# Patient Record
Sex: MALE | Race: BLACK OR AFRICAN AMERICAN | Employment: OTHER | URBAN - METROPOLITAN AREA
[De-identification: names, ages, dates, MRNs, and addresses within clinical notes are randomized per-mention and may not be internally consistent; named-entity substitution may affect disease eponyms.]

---

## 2017-03-05 RX ORDER — LEVETIRACETAM 500 MG/1
TABLET ORAL
Qty: 90 TAB | Refills: 3 | Status: SHIPPED | OUTPATIENT
Start: 2017-03-05 | End: 2017-12-12 | Stop reason: SDUPTHER

## 2017-03-09 RX ORDER — LEVETIRACETAM 500 MG/1
TABLET ORAL
Qty: 90 TAB | Refills: 3 | Status: SHIPPED | OUTPATIENT
Start: 2017-03-09 | End: 2017-11-10 | Stop reason: SDUPTHER

## 2017-03-16 RX ORDER — RASAGILINE 1 MG/1
TABLET ORAL
Qty: 30 TAB | Refills: 5 | Status: SHIPPED | OUTPATIENT
Start: 2017-03-16 | End: 2017-09-29 | Stop reason: SDUPTHER

## 2017-05-29 DIAGNOSIS — R41.3 MEMORY LOSS: ICD-10-CM

## 2017-05-29 DIAGNOSIS — R56.9 CONVULSIONS, UNSPECIFIED CONVULSION TYPE (HCC): ICD-10-CM

## 2017-05-29 DIAGNOSIS — G20 PARKINSON DISEASE (HCC): ICD-10-CM

## 2017-05-29 DIAGNOSIS — E53.8 B12 DEFICIENCY: ICD-10-CM

## 2017-05-29 DIAGNOSIS — R27.0 ATAXIA: ICD-10-CM

## 2017-05-29 DIAGNOSIS — I69.30 LATE EFFECT OF STROKE: ICD-10-CM

## 2017-05-29 DIAGNOSIS — R55 VASOVAGAL SYNCOPE: ICD-10-CM

## 2017-05-29 RX ORDER — ASPIRIN AND DIPYRIDAMOLE 25; 200 MG/1; MG/1
CAPSULE, EXTENDED RELEASE ORAL
Qty: 60 CAP | Refills: 6 | Status: SHIPPED | OUTPATIENT
Start: 2017-05-29 | End: 2017-10-11 | Stop reason: SDUPTHER

## 2017-06-30 DIAGNOSIS — F02.80 DEMENTIA OF THE ALZHEIMER'S TYPE WITH LATE ONSET WITHOUT BEHAVIORAL DISTURBANCE (HCC): ICD-10-CM

## 2017-06-30 DIAGNOSIS — G20 PARKINSON DISEASE (HCC): ICD-10-CM

## 2017-06-30 DIAGNOSIS — I65.23 ASYMPTOMATIC CAROTID ARTERY STENOSIS, BILATERAL: ICD-10-CM

## 2017-06-30 DIAGNOSIS — G30.1 DEMENTIA OF THE ALZHEIMER'S TYPE WITH LATE ONSET WITHOUT BEHAVIORAL DISTURBANCE (HCC): ICD-10-CM

## 2017-06-30 DIAGNOSIS — I69.30 LATE EFFECT OF STROKE: ICD-10-CM

## 2017-06-30 DIAGNOSIS — Z86.73 HISTORY OF CVA (CEREBROVASCULAR ACCIDENT): ICD-10-CM

## 2017-06-30 DIAGNOSIS — R27.0 ATAXIA: ICD-10-CM

## 2017-06-30 DIAGNOSIS — E53.8 B12 DEFICIENCY: ICD-10-CM

## 2017-06-30 DIAGNOSIS — R55 VASOVAGAL SYNCOPE: ICD-10-CM

## 2017-06-30 DIAGNOSIS — R56.9 CONVULSIONS, UNSPECIFIED CONVULSION TYPE (HCC): ICD-10-CM

## 2017-06-30 DIAGNOSIS — R41.3 MEMORY LOSS: ICD-10-CM

## 2017-06-30 DIAGNOSIS — I65.23 STENOSIS OF BOTH CAROTID ARTERIES WITHOUT CEREBRAL INFARCTION: ICD-10-CM

## 2017-06-30 RX ORDER — ASPIRIN AND DIPYRIDAMOLE 25; 200 MG/1; MG/1
1 CAPSULE, EXTENDED RELEASE ORAL 2 TIMES DAILY
Qty: 180 CAP | Refills: 0 | Status: SHIPPED | OUTPATIENT
Start: 2017-06-30 | End: 2018-03-22 | Stop reason: SDUPTHER

## 2017-06-30 NOTE — TELEPHONE ENCOUNTER
Future Appointments  Date Time Provider Nakita Conchita   9/6/2017 1:20 PM Hyadee Russo MD [address] [place] [address],[facility], [id number]                         Last Appointment My Department:  4/19/2016    Please advise of refill below. Patient needs 90 day fill  Requested Prescriptions     Pending Prescriptions Disp Refills    aspirin-dipyridamole (AGGRENOX)  mg per SR capsule 180 Cap 0     Sig: Take 1 Cap by mouth two (2) times a day.  PATIENT MUST CALL TO MAKE APPOINTMENT FOR FURTHER REFILLS

## 2017-07-28 DIAGNOSIS — G20 PARKINSON DISEASE (HCC): ICD-10-CM

## 2017-07-28 DIAGNOSIS — R56.9 CONVULSIONS, UNSPECIFIED CONVULSION TYPE (HCC): ICD-10-CM

## 2017-07-28 DIAGNOSIS — E53.8 B12 DEFICIENCY: ICD-10-CM

## 2017-07-28 DIAGNOSIS — R27.0 ATAXIA: ICD-10-CM

## 2017-07-28 DIAGNOSIS — R55 VASOVAGAL SYNCOPE: ICD-10-CM

## 2017-07-28 DIAGNOSIS — R41.3 MEMORY LOSS: ICD-10-CM

## 2017-07-28 DIAGNOSIS — I69.30 LATE EFFECT OF STROKE: ICD-10-CM

## 2017-07-28 RX ORDER — CARBIDOPA AND LEVODOPA 25; 100 MG/1; MG/1
2 TABLET ORAL 3 TIMES DAILY
Qty: 180 TAB | Refills: 0 | Status: SHIPPED | OUTPATIENT
Start: 2017-07-28 | End: 2017-09-06 | Stop reason: SDUPTHER

## 2017-08-16 PROBLEM — N31.8 DETRUSOR DYSFUNCTION: Status: ACTIVE | Noted: 2017-08-16

## 2017-08-16 PROBLEM — R53.83 FATIGUE: Status: ACTIVE | Noted: 2017-08-16

## 2017-08-16 PROBLEM — E78.00 ELEVATED CHOLESTEROL: Status: ACTIVE | Noted: 2017-08-16

## 2017-08-16 PROBLEM — Z79.899 ON STATIN THERAPY: Status: ACTIVE | Noted: 2017-08-16

## 2017-08-16 PROBLEM — D51.0 PERNICIOUS ANEMIA: Status: ACTIVE | Noted: 2017-08-16

## 2017-08-16 PROBLEM — L03.90 CELLULITIS: Status: ACTIVE | Noted: 2017-08-16

## 2017-08-16 PROBLEM — C61 PROSTATE CANCER (HCC): Status: ACTIVE | Noted: 2017-08-16

## 2017-08-16 PROBLEM — M19.079 ARTHRITIS OF ANKLE: Status: ACTIVE | Noted: 2017-08-16

## 2017-08-16 PROBLEM — G20 PARKINSONS DISEASE (HCC): Status: ACTIVE | Noted: 2017-08-16

## 2017-08-16 PROBLEM — R63.4 WEIGHT LOSS: Status: ACTIVE | Noted: 2017-08-16

## 2017-08-16 PROBLEM — G40.909 SEIZURE DISORDER (HCC): Status: ACTIVE | Noted: 2017-08-16

## 2017-08-16 PROBLEM — Z72.0 TOBACCO ABUSE: Status: ACTIVE | Noted: 2017-08-16

## 2017-08-16 PROBLEM — I67.9 CEREBROVASCULAR DISEASE: Status: ACTIVE | Noted: 2017-08-16

## 2017-08-16 PROBLEM — I73.00 RAYNAUD DISEASE: Status: ACTIVE | Noted: 2017-08-16

## 2017-08-16 PROBLEM — R60.9 EDEMA: Status: ACTIVE | Noted: 2017-08-16

## 2017-08-16 RX ORDER — GENTAMICIN SULFATE 0.3 %
0.5 OINTMENT (GRAM) OPHTHALMIC (EYE) 3 TIMES DAILY
COMMUNITY
End: 2018-09-21

## 2017-08-16 RX ORDER — TAMSULOSIN HYDROCHLORIDE 0.4 MG/1
0.4 CAPSULE ORAL DAILY
COMMUNITY
End: 2018-01-11 | Stop reason: SDUPTHER

## 2017-08-16 RX ORDER — HYDROCHLOROTHIAZIDE 25 MG/1
25 TABLET ORAL DAILY
COMMUNITY
End: 2018-09-21

## 2017-08-16 RX ORDER — DICLOFENAC SODIUM 100 MG/1
100 TABLET, FILM COATED, EXTENDED RELEASE ORAL DAILY
COMMUNITY
End: 2018-09-21

## 2017-08-16 RX ORDER — METHYLPREDNISOLONE 4 MG/1
TABLET ORAL
COMMUNITY
End: 2018-09-21

## 2017-08-24 NOTE — TELEPHONE ENCOUNTER
Requested Prescriptions     Pending Prescriptions Disp Refills    enalapril (VASOTEC) 5 mg tablet 90 Tab 3     Sig: Take 1 Tab by mouth daily.        Last Refill: 06/27/2017  Next Appointment:09/06/2017

## 2017-08-25 RX ORDER — ENALAPRIL MALEATE 5 MG/1
5 TABLET ORAL DAILY
Qty: 90 TAB | Refills: 3 | Status: SHIPPED | OUTPATIENT
Start: 2017-08-25 | End: 2018-09-10 | Stop reason: SDUPTHER

## 2017-09-06 ENCOUNTER — OFFICE VISIT (OUTPATIENT)
Dept: INTERNAL MEDICINE CLINIC | Age: 82
End: 2017-09-06

## 2017-09-06 VITALS
DIASTOLIC BLOOD PRESSURE: 69 MMHG | WEIGHT: 139 LBS | HEIGHT: 67 IN | BODY MASS INDEX: 21.82 KG/M2 | SYSTOLIC BLOOD PRESSURE: 154 MMHG

## 2017-09-06 DIAGNOSIS — Z72.0 TOBACCO ABUSE: ICD-10-CM

## 2017-09-06 DIAGNOSIS — I10 ESSENTIAL HYPERTENSION: Chronic | ICD-10-CM

## 2017-09-06 DIAGNOSIS — G20 PARKINSON DISEASE (HCC): ICD-10-CM

## 2017-09-06 DIAGNOSIS — Z79.899 ON STATIN THERAPY: ICD-10-CM

## 2017-09-06 DIAGNOSIS — D64.9 ANEMIA, UNSPECIFIED: ICD-10-CM

## 2017-09-06 DIAGNOSIS — R41.3 MEMORY LOSS: ICD-10-CM

## 2017-09-06 DIAGNOSIS — R55 VASOVAGAL SYNCOPE: ICD-10-CM

## 2017-09-06 DIAGNOSIS — R27.0 ATAXIA: ICD-10-CM

## 2017-09-06 DIAGNOSIS — I69.30 LATE EFFECT OF STROKE: ICD-10-CM

## 2017-09-06 DIAGNOSIS — E78.00 ELEVATED CHOLESTEROL: ICD-10-CM

## 2017-09-06 DIAGNOSIS — Z23 ENCOUNTER FOR IMMUNIZATION: ICD-10-CM

## 2017-09-06 DIAGNOSIS — R56.9 CONVULSIONS, UNSPECIFIED CONVULSION TYPE (HCC): ICD-10-CM

## 2017-09-06 DIAGNOSIS — G40.909 SEIZURE DISORDER (HCC): Primary | ICD-10-CM

## 2017-09-06 DIAGNOSIS — I67.9 CEREBROVASCULAR DISEASE: ICD-10-CM

## 2017-09-06 DIAGNOSIS — E53.8 B12 DEFICIENCY: ICD-10-CM

## 2017-09-06 DIAGNOSIS — G20 PARKINSONS DISEASE (HCC): ICD-10-CM

## 2017-09-06 LAB
ALBUMIN SERPL-MCNC: 3.9 G/DL (ref 3.9–5.4)
ALKALINE PHOS POC: 77 U/L (ref 38–126)
ALT SERPL-CCNC: 34 U/L (ref 9–52)
AST SERPL-CCNC: 46 U/L (ref 14–36)
BACTERIA UA POCT, BACTPOCT: NORMAL
BILIRUB UR QL STRIP: NORMAL
BUN BLD-MCNC: 25 MG/DL (ref 9–20)
CALCIUM BLD-MCNC: 9.7 MG/DL (ref 8.4–10.2)
CASTS UA POCT: NORMAL
CHLORIDE BLD-SCNC: 105 MMOL/L (ref 98–107)
CHOLEST SERPL-MCNC: 124 MG/DL (ref 0–200)
CK (CPK) POC: 99 U/L (ref 30–135)
CLUE CELLS, CLUEPOCT: NEGATIVE
CO2 POC: 26 MMOL/L (ref 22–32)
CREAT BLD-MCNC: 1.2 MG/DL (ref 0.8–1.5)
CRYSTALS UA POCT, CRYSPOCT: NEGATIVE
EGFR (POC): 55.6
EPITHELIAL CELLS POCT, EPITHPOCT: NORMAL
GLUCOSE POC: 101 MG/DL (ref 75–110)
GLUCOSE UR-MCNC: NEGATIVE MG/DL
GRAN# POC: 8.3 K/UL (ref 2–7.8)
GRAN% POC: 80.1 % (ref 37–92)
HCT VFR BLD CALC: 35.9 % (ref 37–51)
HDLC SERPL-MCNC: 63 MG/DL (ref 35–130)
HGB BLD-MCNC: 11.6 G/DL (ref 12–18)
KETONES P FAST UR STRIP-MCNC: NORMAL MG/DL
LDL CHOLESTEROL POC: 51.4 MG/DL (ref 0–130)
LY# POC: 1.7 K/UL (ref 0.6–4.1)
LY% POC: 17 % (ref 10–58.5)
MCH RBC QN: 30.8 PG (ref 26–32)
MCHC RBC-ENTMCNC: 32.5 G/DL (ref 30–36)
MCV RBC: 95 FL (ref 80–97)
MID #, POC: 0.2 K/UL (ref 0–1.8)
MID% POC: 2.9 % (ref 0.1–24)
MUCUS UA POCT, MUCPOCT: NORMAL
PH UR STRIP: 5 [PH] (ref 5–7)
PLATELET # BLD: 203 K/UL (ref 140–440)
POTASSIUM SERPL-SCNC: 4.9 MMOL/L (ref 3.6–5)
PROT SERPL-MCNC: 6.7 G/DL (ref 6.3–8.2)
PROTEIN,URINE POC: NEGATIVE MG/DL
RBC # BLD: 3.79 M/UL (ref 4.2–6.3)
RBC UA POCT, RBCPOCT: NORMAL
SODIUM SERPL-SCNC: 145 MMOL/L (ref 137–145)
SP GR UR STRIP: 1.02 (ref 1.01–1.02)
TCHOL/HDL RATIO (POC): 2 (ref 0–4)
TOTAL BILIRUBIN POC: 0.3 MG/DL (ref 0.2–1.3)
TRICH UA POCT, TRICHPOC: NEGATIVE
TRIGL SERPL-MCNC: 48 MG/DL (ref 0–200)
UA UROBILINOGEN AMB POC: NORMAL (ref 0.2–1)
URINALYSIS CLARITY POC: CLEAR
URINALYSIS COLOR POC: NORMAL
URINE BLOOD POC: NEGATIVE
URINE LEUKOCYTES POC: NORMAL
URINE NITRITES POC: NEGATIVE
VLDLC SERPL CALC-MCNC: 9.6 MG/DL
WBC # BLD: 10.2 K/UL (ref 4.1–10.9)
WBC UA POCT, WBCPOCT: NORMAL
YEAST UA POCT, YEASTPOC: NEGATIVE

## 2017-09-06 RX ORDER — CARBIDOPA AND LEVODOPA 25; 100 MG/1; MG/1
TABLET ORAL
Qty: 180 TAB | Refills: 0 | Status: SHIPPED | OUTPATIENT
Start: 2017-09-06 | End: 2017-09-08 | Stop reason: SDUPTHER

## 2017-09-06 NOTE — PATIENT INSTRUCTIONS
Parkinson's Disease: Care Instructions  Your Care Instructions  Parkinson's disease can cause tremors, stiffness, and problems with movement. Severe or advanced cases can also cause problems with thinking. In Parkinson's disease, part of the brain cannot make enough dopamine, a chemical that helps control movement. Taking your medicines correctly and getting regular exercise may help you maintain your quality of life. There are many things that can cause Parkinson's disease symptoms, including some medicine, some toxins, and trauma to the head. The cause in most cases is not known. Follow-up care is a key part of your treatment and safety. Be sure to make and go to all appointments, and call your doctor if you are having problems. Its also a good idea to know your test results and keep a list of the medicines you take. How can you care for yourself at home? General care  · Take your medicines exactly as prescribed. Call your doctor if you think you are having a problem with your medicine. · Make sure your home is safe:  ¨ Place furniture so that you have something to hold on to as you walk around the house. ¨ Use chairs that make it easier to sit down and stand up. ¨ Group the things you use most, such as reading glasses, keys, and the telephone, in one easy-to-reach place. ¨ Tack down rugs so that you do not trip. ¨ Put no-slip tape and handrails in the tub to prevent falls. · Use a cane, walker, or scooter if your doctor suggests it. · Keep up your normal activities as much as you can. · Find ways to manage stress, which can make symptoms worse. · Spend time with family and friends. Join a support group for people with Parkinson's disease if you want extra help. · Depression is common with this condition. Tell your doctor if you have trouble sleeping, are eating too much or are not hungry, or feel sad or tearful all the time. Depression can be treated with medicine and counseling.   Diet and exercise  · Eat a balanced diet. · If you are taking levodopa, do not eat protein at the same time you take your medicine. Levodopa may not work as well if you take it at the same time you eat protein. You can eat normal amounts of protein. Talk to your doctor if you have questions. · If you have problems swallowing, change how and what you eat:  ¨ Try thick drinks, such as milk shakes. They are easier to swallow than other fluids. ¨ Do not eat foods that crumble easily. These can cause choking. ¨ Use a  to prepare food. Soft foods need less chewing. ¨ Eat small meals often so that you do not get tired from eating heavy meals. · Drink plenty of water and eat a high-fiber diet to prevent constipation. Parkinson'sand the medicines that treat itmay slow your intestines. · Get exercise on most days. Work with your doctor to set up a program of walking, swimming, or other exercise you are able to do. When should you call for help? Call your doctor now or seek immediate medical care if:  · You have a change in your symptoms. · You develop other problems from your condition, such as:  ¨ Injury from a fall. ¨ Thinking or memory problems. ¨ A urinary tract infection (burning pain when urinating). Watch closely for changes in your health, and be sure to contact your doctor if:  · You lose weight because of problems with eating. · You want more information about your condition or your medicines. Where can you learn more? Go to http://georgia-grant.info/. Enter D005 in the search box to learn more about \"Parkinson's Disease: Care Instructions. \"  Current as of: October 14, 2016  Content Version: 11.3  © 5759-6501 [employer]. Care instructions adapted under license by [employer] (which disclaims liability or warranty for this information).  If you have questions about a medical condition or this instruction, always ask your healthcare professional. [employer] disclaims any warranty or liability for your use of this information. Stopping Smoking: Care Instructions  Your Care Instructions  Cigarette smokers crave the nicotine in cigarettes. Giving it up is much harder than simply changing a habit. Your body has to stop craving the nicotine. It is hard to quit, but you can do it. There are many tools that people use to quit smoking. You may find that combining tools works best for you. There are several steps to quitting. First you get ready to quit. Then you get support to help you. After that, you learn new skills and behaviors to become a nonsmoker. For many people, a necessary step is getting and using medicine. Your doctor will help you set up the plan that best meets your needs. You may want to attend a smoking cessation program to help you quit smoking. When you choose a program, look for one that has proven success. Ask your doctor for ideas. You will greatly increase your chances of success if you take medicine as well as get counseling or join a cessation program.  Some of the changes you feel when you first quit tobacco are uncomfortable. Your body will miss the nicotine at first, and you may feel short-tempered and grumpy. You may have trouble sleeping or concentrating. Medicine can help you deal with these symptoms. You may struggle with changing your smoking habits and rituals. The last step is the tricky one: Be prepared for the smoking urge to continue for a time. This is a lot to deal with, but keep at it. You will feel better. Follow-up care is a key part of your treatment and safety. Be sure to make and go to all appointments, and call your doctor if you are having problems. Its also a good idea to know your test results and keep a list of the medicines you take. How can you care for yourself at home? · Ask your family, friends, and coworkers for support. You have a better chance of quitting if you have help and support.   · Join a support group, such as Nicotine Anonymous, for people who are trying to quit smoking. · Consider signing up for a smoking cessation program, such as the American Lung Association's Freedom from Smoking program.  · Set a quit date. Pick your date carefully so that it is not right in the middle of a big deadline or stressful time. Once you quit, do not even take a puff. Get rid of all ashtrays and lighters after your last cigarette. Clean your house and your clothes so that they do not smell of smoke. · Learn how to be a nonsmoker. Think about ways you can avoid those things that make you reach for a cigarette. ¨ Avoid situations that put you at greatest risk for smoking. For some people, it is hard to have a drink with friends without smoking. For others, they might skip a coffee break with coworkers who smoke. ¨ Change your daily routine. Take a different route to work or eat a meal in a different place. · Cut down on stress. Calm yourself or release tension by doing an activity you enjoy, such as reading a book, taking a hot bath, or gardening. · Talk to your doctor or pharmacist about nicotine replacement therapy, which replaces the nicotine in your body. You still get nicotine but you do not use tobacco. Nicotine replacement products help you slowly reduce the amount of nicotine you need. These products come in several forms, many of them available over-the-counter:  ¨ Nicotine patches  ¨ Nicotine gum and lozenges  ¨ Nicotine inhaler  · Ask your doctor about bupropion (Wellbutrin) or varenicline (Chantix), which are prescription medicines. They do not contain nicotine. They help you by reducing withdrawal symptoms, such as stress and anxiety. · Some people find hypnosis, acupuncture, and massage helpful for ending the smoking habit. · Eat a healthy diet and get regular exercise. Having healthy habits will help your body move past its craving for nicotine. · Be prepared to keep trying.  Most people are not successful the first few times they try to quit. Do not get mad at yourself if you smoke again. Make a list of things you learned and think about when you want to try again, such as next week, next month, or next year. Where can you learn more? Go to http://georgia-grant.info/. Enter L133 in the search box to learn more about \"Stopping Smoking: Care Instructions. \"  Current as of: March 20, 2017  Content Version: 11.3  © 0896-8597 [employer]. Care instructions adapted under license by [employer] (which disclaims liability or warranty for this information). If you have questions about a medical condition or this instruction, always ask your healthcare professional. [address] any warranty or liability for your use of this information. Vaccine Information Statement    Influenza (Flu) Vaccine (Inactivated or Recombinant): What you need to know    Many Vaccine Information Statements are available in Turkish and other languages. See www.immunize.org/vis  Hojas de Información Sobre Vacunas están disponibles en Español y en muchos otros idiomas. Visite www.immunize.org/vis    1. Why get vaccinated? Influenza (flu) is a contagious disease that spreads around the [place] every year, usually between October and May. Flu is caused by influenza viruses, and is spread mainly by coughing, sneezing, and close contact. Anyone can get flu. Flu strikes suddenly and can last several days. Symptoms vary by age, but can include:   fever/chills   sore throat   muscle aches   fatigue   cough   headache    runny or stuffy nose    Flu can also lead to pneumonia and blood infections, and cause diarrhea and seizures in children. If you have a medical condition, such as heart or lung disease, flu can make it worse. Flu is more dangerous for some people.  Infants and young children, people 72years of age and older, pregnant women, and people with certain health conditions or a weakened immune system are at greatest risk. Each year thousands of people in the [facility] die from flu, and many more are hospitalized. Flu vaccine can:   keep you from getting flu,   make flu less severe if you do get it, and   keep you from spreading flu to your family and other people. 2. Inactivated and recombinant flu vaccines    A dose of flu vaccine is recommended every flu season. Children 6 months through 6years of age may need two doses during the same flu season. Everyone else needs only one dose each flu season. Some inactivated flu vaccines contain a very small amount of a mercury-based preservative called thimerosal. Studies have not shown thimerosal in vaccines to be harmful, but flu vaccines that do not contain thimerosal are available. There is no live flu virus in flu shots. They cannot cause the flu. There are many flu viruses, and they are always changing. Each year a new flu vaccine is made to protect against three or four viruses that are likely to cause disease in the upcoming flu season. But even when the vaccine doesnt exactly match these viruses, it may still provide some protection    Flu vaccine cannot prevent:   flu that is caused by a virus not covered by the vaccine, or   illnesses that look like flu but are not. It takes about 2 weeks for protection to develop after vaccination, and protection lasts through the flu season. 3. Some people should not get this vaccine    Tell the person who is giving you the vaccine:     If you have any severe, life-threatening allergies. If you ever had a life-threatening allergic reaction after a dose of flu vaccine, or have a severe allergy to any part of this vaccine, you may be advised not to get vaccinated. Most, but not all, types of flu vaccine contain a small amount of egg protein.  If you ever had Guillain-Barré Syndrome (also called GBS).    Some people with a history of GBS should not get this vaccine. This should be discussed with your doctor.  If you are not feeling well. It is usually okay to get flu vaccine when you have a mild illness, but you might be asked to come back when you feel better. 4. Risks of a vaccine reaction    With any medicine, including vaccines, there is a chance of reactions. These are usually mild and go away on their own, but serious reactions are also possible. Most people who get a flu shot do not have any problems with it. Minor problems following a flu shot include:    soreness, redness, or swelling where the shot was given     hoarseness   sore, red or itchy eyes   cough   fever   aches   headache   itching   fatigue  If these problems occur, they usually begin soon after the shot and last 1 or 2 days. More serious problems following a flu shot can include the following:     There may be a small increased risk of Guillain-Barré Syndrome (GBS) after inactivated flu vaccine. This risk has been estimated at 1 or 2 additional cases per million people vaccinated. This is much lower than the risk of severe complications from flu, which can be prevented by flu vaccine.  Young children who get the flu shot along with pneumococcal vaccine (PCV13) and/or DTaP vaccine at the same time might be slightly more likely to have a seizure caused by fever. Ask your doctor for more information. Tell your doctor if a child who is getting flu vaccine has ever had a seizure. Problems that could happen after any injected vaccine:      People sometimes faint after a medical procedure, including vaccination. Sitting or lying down for about 15 minutes can help prevent fainting, and injuries caused by a fall. Tell your doctor if you feel dizzy, or have vision changes or ringing in the ears.  Some people get severe pain in the shoulder and have difficulty moving the arm where a shot was given.  This happens very rarely.  Any medication can cause a severe allergic reaction. Such reactions from a vaccine are very rare, estimated at about 1 in a million doses, and would happen within a few minutes to a few hours after the vaccination. As with any medicine, there is a very remote chance of a vaccine causing a serious injury or death. The safety of vaccines is always being monitored. For more information, visit: www.cdc.gov/vaccinesafety/    5. What if there is a serious reaction? What should I look for?  Look for anything that concerns you, such as signs of a severe allergic reaction, very high fever, or unusual behavior. Signs of a severe allergic reaction can include hives, swelling of the face and throat, difficulty breathing, a fast heartbeat, dizziness, and weakness  usually within a few minutes to a few hours after the vaccination. What should I do?  If you think it is a severe allergic reaction or other emergency that cant wait, call 9-1-1 and get the person to the nearest hospital. Otherwise, call your doctor.  Reactions should be reported to the Vaccine Adverse Event Reporting System (VAERS). Your doctor should file this report, or you can do it yourself through  the VAERS web site at www.vaers. [facility].gov, or by calling 6-335.843.5170. [employer] does not give medical advice. 6. The National Vaccine Injury Compensation Program    The [facility] Vaccine Injury Compensation Program (VICP) is a federal program that was created to compensate people who may have been injured by certain vaccines. Persons who believe they may have been injured by a vaccine can learn about the program and about filing a claim by calling 0-404.863.7873 or visiting the [employer] website at www.[facility].gov/vaccinecompensation. There is a time limit to file a claim for compensation. 7. How can I learn more?  Ask your healthcare provider.  He or she can give you the vaccine package insert or suggest other sources of information.  Call your local or state health department.  Contact the Centers for Disease Control and Prevention (CDC):  - Call 5-541.970.9532 (1-800-CDC-INFO) or  - Visit CDCs website at www.cdc.gov/flu    Vaccine Information Statement   Inactivated Influenza Vaccine   8/7/2015  42 DANILO Ferguson [id number]    Department of Health and Human Services  Centers for Disease Control and Prevention    Office Use Only    Vaccine Information Statement    Pneumococcal Polysaccharide Vaccine: What You Need to Know    Many Vaccine Information Statements are available in Croatian and other languages. See www.immunize.org/vis. Hojas de información Sobre Vacunas están disponibles en español y en muchos otros idiomas. Visite Aguilar.si. 1. Why get vaccinated? Vaccination can protect older adults (and some children and younger adults) from pneumococcal disease. Pneumococcal disease is caused by bacteria that can spread from person to person through close contact. It can cause ear infections, and it can also lead to more serious infections of the:   Lungs (pneumonia),   Blood (bacteremia), and   Covering of the brain and spinal cord (meningitis). Meningitis can cause deafness and brain damage, and it can be fatal.      Anyone can get pneumococcal disease, but children under 3years of age, people with certain medical conditions, adults over 72years of age, and cigarette smokers are at the highest risk. About 18,000 older adults die each year from pneumococcal disease in the [place]. Treatment of pneumococcal infections with penicillin and other drugs used to be more effective. But some strains of the disease have become resistant to these drugs. This makes prevention of the disease, through vaccination, even more important. 2. Pneumococcal polysaccharide vaccine (PPSV23)    Pneumococcal polysaccharide vaccine (PPSV23) protects against 23 types of pneumococcal bacteria.  It will not prevent all pneumococcal disease. PPSV23 is recommended for:   All adults 72years of age and older,   Anyone 2 through 59years of age with certain long-term health problems,   Anyone 2 through 59years of age with a weakened immune system,   Adults 23 through 59years of age who smoke cigarettes or have asthma. Most people need only one dose of PPSV. A second dose is recommended for certain high-risk groups. People 72 and older should get a dose even if they have gotten one or more doses of the vaccine before they turned 65. Your healthcare provider can give you more information about these recommendations. Most healthy adults develop protection within 2 to 3 weeks of getting the shot. 3. Some people should not get this vaccine     Anyone who has had a life-threatening allergic reaction to PPSV should not get another dose.  Anyone who has a severe allergy to any component of PPSV should not receive it. Tell your provider if you have any severe allergies.  Anyone who is moderately or severely ill when the shot is scheduled may be asked to wait until they recover before getting the vaccine. Someone with a mild illness can usually be vaccinated.  Children less than 3years of age should not receive this vaccine.  There is no evidence that PPSV is harmful to either a pregnant woman or to her fetus. However, as a precaution, women who need the vaccine should be vaccinated before becoming pregnant, if possible. 4. Risks of a vaccine reaction    With any medicine, including vaccines, there is a chance of side effects. These are usually mild and go away on their own, but serious reactions are also possible. About half of people who get PPSV have mild side effects, such as redness or pain where the shot is given, which go away within about two days. Less than 1 out of 100 people develop a fever, muscle aches, or more severe local reactions.     Problems that could happen after any vaccine:     People sometimes faint after a medical procedure, including vaccination. Sitting or lying down for about 15 minutes can help prevent fainting, and injuries caused by a fall. Tell your doctor if you feel dizzy, or have vision changes or ringing in the ears.  Some people get severe pain in the shoulder and have difficulty moving the arm where a shot was given. This happens very rarely.  Any medication can cause a severe allergic reaction. Such reactions from a vaccine are very rare, estimated at about 1 in a million doses, and would happen within a few minutes to a few hours after the vaccination. As with any medicine, there is a very remote chance of a vaccine causing a serious injury or death. The safety of vaccines is always being monitored. For more information, visit: www.cdc.gov/vaccinesafety/     5. What if there is a serious reaction? What should I look for? Look for anything that concerns you, such as signs of a severe allergic reaction, very high fever, or unusual behavior. Signs of a severe allergic reaction can include hives, swelling of the face and throat, difficulty breathing, a fast heartbeat, dizziness, and weakness. These would usually start a few minutes to a few hours after the vaccination. What should I do? If you think it is a severe allergic reaction or other emergency that cant wait, call 9-1-1 or get to the nearest hospital. Otherwise, call your doctor. Afterward, the reaction should be reported to the Vaccine Adverse Event Reporting System (VAERS). Your doctor might file this report, or you can do it yourself through the VAERS web site at www.vaers. hhs.gov, or by calling 5-572.521.5653. VAERS does not give medical advice. 6. How can I learn more?  Ask your doctor. He or she can give you the vaccine package insert or suggest other sources of information.  Call your local or state health department.    Contact the Centers for Disease Control and Prevention (CDC):  - Call 2-636.824.7361 (9-513-QZO-INFO) or  - Visit CDCs website at [facility] 04/24/2015    Department of Health and [facility] for Disease Control and Prevention    Office Use Only

## 2017-09-06 NOTE — PROGRESS NOTES
Qi Davis is a 80 y.o. male presenting for Blood Pressure Check (RM 1 - 6 mo follow up) and Cholesterol Problem  . 1. Have you been to the ER, urgent care clinic since your last visit? Hospitalized since your last visit? No    2. Have you seen or consulted any other health care providers outside of the [facility] since your last visit? Include any pap smears or colon screening.  No

## 2017-09-06 NOTE — MR AVS SNAPSHOT
Visit Information Date & Time Provider Department Dept. Phone Encounter #  
 9/6/2017  1:20 PM LUCIA Cordero MD [address] [place] [address] [facility] [phone] [id number] Follow-up Instructions Return in about 6 months (around 3/6/2018) for follow up. Your Appointments 3/9/2018  3:20 PM  
Follow Up with Rosita Barrios MD  
[facility] at [facility] Appt Note: f/u & medication refill [address], 
[address] [place] [address], [phone]  
[address] , [address] [place] [address], [phone] Upcoming Health Maintenance Date Due DTaP/Tdap/Td series (1 - Tdap) 5/3/1955 ZOSTER VACCINE AGE 60> 3/3/1994 GLAUCOMA SCREENING Q2Y 5/3/1999 MEDICARE YEARLY EXAM 5/3/1999 INFLUENZA AGE 9 TO ADULT 8/1/2017 Allergies as of 9/6/2017  Review Complete On: 9/6/2017 By: Maria Del Rosario Cody MD  
  
 Severity Noted Reaction Type Reactions Pcn [Penicillins]  06/01/2012    Swelling Current Immunizations  Reviewed on 4/18/2014 Name Date Influenza High Dose Vaccine PF 9/6/2017 Influenza Vaccine 12/2/2015, 12/6/2012 Pneumococcal Conjugate (PCV-13) 12/2/2015 Pneumococcal Polysaccharide (PPSV-23) 9/6/2017 Pneumococcal Vaccine (Unspecified Type) 1/1/2013, 12/6/2012 ZZZ-RETIRED (DO NOT USE) Pneumococcal Vaccine (Unspecified Type)  Deferred (Patient Refused) Not reviewed this visit You Were Diagnosed With   
  
 Codes Comments Seizure disorder ([facility]    -  Primary ICD-10-CM: O35.240 ICD-9-CM: 345.90 Parkinsons disease ([facility]     ICD-10-CM: G20 
ICD-9-CM: 332.0 Essential hypertension     ICD-10-CM: I10 
ICD-9-CM: 401.9 Cerebrovascular disease     ICD-10-CM: I67.9 ICD-9-CM: 437.9 On statin therapy     ICD-10-CM: Z79.899 ICD-9-CM: V58.69 Elevated cholesterol     ICD-10-CM: E78.00 ICD-9-CM: 272.0 Tobacco abuse     ICD-10-CM: Z72.0 ICD-9-CM: 305.1 Anemia, unspecified     ICD-10-CM: D64.9 ICD-9-CM: 285.9 Encounter for immunization     ICD-10-CM: B24 ICD-9-CM: V03.89 Vitals BP Height(growth percentile) Weight(growth percentile) BMI Smoking Status 154/69 (BP 1 Location: Left arm, BP Patient Position: Sitting) 5' 7\" (1.702 m) 139 lb (63 kg) 21.77 kg/m2 Current Every Day Smoker Vitals History BMI and BSA Data Body Mass Index Body Surface Area 21.77 kg/m 2 1.73 m 2 Preferred Pharmacy Pharmacy Name Phone [id number] [address], [facility] [phone] Your Updated Medication List  
  
   
This list is accurate as of: 9/6/17  2:03 PM.  Always use your most recent med list. amLODIPine 10 mg tablet Commonly known as:  Gurvinder Neema Take 10 mg by mouth daily. * aspirin-dipyridamole  mg per SR capsule Commonly known as:  AGGRENOX  
TAKE ONE CAPSULE BY MOUTH TWICE A DAY * aspirin-dipyridamole  mg per SR capsule Commonly known as:  AGGRENOX Take 1 Cap by mouth two (2) times a day. PATIENT MUST CALL TO MAKE APPOINTMENT FOR FURTHER REFILLS  
  
 carbidopa-levodopa  mg per tablet Commonly known as:  SINEMET  
TAKE 2 TABS BY MOUTH THREE (3) TIMES DAILY. PATIENT MUST MAKE APPOINTMENT FOR FURTHER REFILLS  
  
 DAILY MULTI-VITAMINS/IRON tablet Generic drug:  multivitamin with iron Take 1 Tab by mouth daily. diclofenac 100 mg ER tablet Commonly known as:  VOLTAREN XR Take 100 mg by mouth daily. enalapril 5 mg tablet Commonly known as:  Trinh Dimmer Take 1 Tab by mouth daily. folic acid-vit W3-JSU C72 2.5-25-2 mg tablet Commonly known as:  Amisha Range Take 1 Tab by mouth daily. gentamicin 0.3 % (3 mg/gram) ophthalmic ointment Commonly known as:  GARAMYCIN Administer 0.5 Inches to both eyes three (3) times daily. hydroCHLOROthiazide 25 mg tablet Commonly known as:  HYDRODIURIL Take 25 mg by mouth daily. * levETIRAcetam 500 mg tablet Commonly known as:  KEPPRA TAKE 1 TABLET BY MOUTH IN THE MORNING. THEN TAKE 2 TABLETS BY MOUTH AT BEDTIME. * levETIRAcetam 500 mg tablet Commonly known as:  KEPPRA TAKE 1 TABLET BY MOUTH IN THE MORNING. THEN TAKE 2 TABLETS BY MOUTH AT BEDTIME. MEDROL 4 mg Tab Generic drug:  methylPREDNISolone Take  by mouth daily (with breakfast). MYRBETRIQ PO Take  by mouth. OXYBUTYNIN CHLORIDE PO Take  by mouth.  
  
 pravastatin 80 mg tablet Commonly known as:  PRAVACHOL Take 1 Tab by mouth nightly. RAPAFLO 8 mg capsule Generic drug:  silodosin Take 8 mg by mouth daily (with breakfast). rasagiline 1 mg tablet Commonly known as:  AZILECT  
TAKE 1 TABLET BY MOUTH DAILY  
  
 tamsulosin 0.4 mg capsule Commonly known as:  FLOMAX Take 0.4 mg by mouth daily. VITAMIN D3 1,000 unit Cap Generic drug:  cholecalciferol Take  by mouth. * Notice: This list has 4 medication(s) that are the same as other medications prescribed for you. Read the directions carefully, and ask your doctor or other care provider to review them with you. We Performed the Following ADMIN INFLUENZA VIRUS VAC [ [facility]] ADMIN PNEUMOCOCCAL VACCINE [ [facility]] AMB POC CK (CPK) [09938 CPT(R)] AMB POC COMPLETE CBC,AUTOMATED ENTER [id number] CPT(R)] AMB POC COMPREHENSIVE METABOLIC PANEL [41730 CPT(R)] AMB POC LIPID PROFILE [60327 CPT(R)] AMB POC URINALYSIS DIP STICK AUTO W/ MICRO  [22540 CPT(R)] INFLUENZA VIRUS VACCINE, HIGH DOSE SEASONAL, PRESERVATIVE FREE [26113 CPT(R)] PNEUMOCOCCAL POLYSACCHARIDE VACCINE, 23-VALENT, ADULT OR IMMUNOSUPPRESSED PT DOSE, [82356 CPT(R)] Follow-up Instructions Return in about 6 months (around 3/6/2018) for follow up. Patient Instructions Parkinson's Disease: Care Instructions Your Care Instructions Parkinson's disease can cause tremors, stiffness, and problems with movement. Severe or advanced cases can also cause problems with thinking. In Parkinson's disease, part of the brain cannot make enough dopamine, a chemical that helps control movement. Taking your medicines correctly and getting regular exercise may help you maintain your quality of life. There are many things that can cause Parkinson's disease symptoms, including some medicine, some toxins, and trauma to the head. The cause in most cases is not known. Follow-up care is a key part of your treatment and safety. Be sure to make and go to all appointments, and call your doctor if you are having problems. Its also a good idea to know your test results and keep a list of the medicines you take. How can you care for yourself at home? General care · Take your medicines exactly as prescribed. Call your doctor if you think you are having a problem with your medicine. · Make sure your home is safe: 
¨ Place furniture so that you have something to hold on to as you walk around the house. ¨ Use chairs that make it easier to sit down and stand up. ¨ Group the things you use most, such as reading glasses, keys, and the telephone, in one easy-to-reach place. ¨ Tack down rugs so that you do not trip. ¨ Put no-slip tape and handrails in the tub to prevent falls. · Use a cane, walker, or scooter if your doctor suggests it. · Keep up your normal activities as much as you can. · Find ways to manage stress, which can make symptoms worse. · Spend time with family and friends. Join a support group for people with Parkinson's disease if you want extra help. · Depression is common with this condition. Tell your doctor if you have trouble sleeping, are eating too much or are not hungry, or feel sad or tearful all the time. Depression can be treated with medicine and counseling. Diet and exercise · Eat a balanced diet. · If you are taking levodopa, do not eat protein at the same time you take your medicine. Levodopa may not work as well if you take it at the same time you eat protein. You can eat normal amounts of protein. Talk to your doctor if you have questions. · If you have problems swallowing, change how and what you eat: ¨ Try thick drinks, such as milk shakes. They are easier to swallow than other fluids. ¨ Do not eat foods that crumble easily. These can cause choking. ¨ Use a  to prepare food. Soft foods need less chewing. ¨ Eat small meals often so that you do not get tired from eating heavy meals. · Drink plenty of water and eat a high-fiber diet to prevent constipation. Parkinson'sand the medicines that treat itmay slow your intestines. · Get exercise on most days. Work with your doctor to set up a program of walking, swimming, or other exercise you are able to do. When should you call for help? Call your doctor now or seek immediate medical care if: 
· You have a change in your symptoms. · You develop other problems from your condition, such as: ¨ Injury from a fall. ¨ Thinking or memory problems. ¨ A urinary tract infection (burning pain when urinating). Watch closely for changes in your health, and be sure to contact your doctor if: 
· You lose weight because of problems with eating. · You want more information about your condition or your medicines. Where can you learn more? Go to http://georgia-grant.info/. Enter H187 in the search box to learn more about \"Parkinson's Disease: Care Instructions. \" Current as of: October 14, 2016 Content Version: 11.3 © 3251-3050 [employer] transform cities. Care instructions adapted under license by [employer] (which disclaims liability or warranty for this information).  If you have questions about a medical condition or this instruction, always ask your healthcare professional. [employer] disclaims any warranty or liability for your use of this information. Stopping Smoking: Care Instructions Your Care Instructions Cigarette smokers crave the nicotine in cigarettes. Giving it up is much harder than simply changing a habit. Your body has to stop craving the nicotine. It is hard to quit, but you can do it. There are many tools that people use to quit smoking. You may find that combining tools works best for you. There are several steps to quitting. First you get ready to quit. Then you get support to help you. After that, you learn new skills and behaviors to become a nonsmoker. For many people, a necessary step is getting and using medicine. Your doctor will help you set up the plan that best meets your needs. You may want to attend a smoking cessation program to help you quit smoking. When you choose a program, look for one that has proven success. Ask your doctor for ideas. You will greatly increase your chances of success if you take medicine as well as get counseling or join a cessation program. 
Some of the changes you feel when you first quit tobacco are uncomfortable. Your body will miss the nicotine at first, and you may feel short-tempered and grumpy. You may have trouble sleeping or concentrating. Medicine can help you deal with these symptoms. You may struggle with changing your smoking habits and rituals. The last step is the tricky one: Be prepared for the smoking urge to continue for a time. This is a lot to deal with, but keep at it. You will feel better. Follow-up care is a key part of your treatment and safety. Be sure to make and go to all appointments, and call your doctor if you are having problems. Its also a good idea to know your test results and keep a list of the medicines you take. How can you care for yourself at home? · Ask your family, friends, and coworkers for support. You have a better chance of quitting if you have help and support. · Join a support group, such as Nicotine Anonymous, for people who are trying to quit smoking. · Consider signing up for a smoking cessation program, such as the American Lung Association's Freedom from Smoking program. 
· Set a quit date. Pick your date carefully so that it is not right in the middle of a big deadline or stressful time. Once you quit, do not even take a puff. Get rid of all ashtrays and lighters after your last cigarette. Clean your house and your clothes so that they do not smell of smoke. · Learn how to be a nonsmoker. Think about ways you can avoid those things that make you reach for a cigarette. ¨ Avoid situations that put you at greatest risk for smoking. For some people, it is hard to have a drink with friends without smoking. For others, they might skip a coffee break with coworkers who smoke. ¨ Change your daily routine. Take a different route to work or eat a meal in a different place. · Cut down on stress. Calm yourself or release tension by doing an activity you enjoy, such as reading a book, taking a hot bath, or gardening. · Talk to your doctor or pharmacist about nicotine replacement therapy, which replaces the nicotine in your body. You still get nicotine but you do not use tobacco. Nicotine replacement products help you slowly reduce the amount of nicotine you need. These products come in several forms, many of them available over-the-counter: ¨ Nicotine patches ¨ Nicotine gum and lozenges ¨ Nicotine inhaler · Ask your doctor about bupropion (Wellbutrin) or varenicline (Chantix), which are prescription medicines. They do not contain nicotine. They help you by reducing withdrawal symptoms, such as stress and anxiety. · Some people find hypnosis, acupuncture, and massage helpful for ending the smoking habit. · Eat a healthy diet and get regular exercise. Having healthy habits will help your body move past its craving for nicotine. · Be prepared to keep trying. Most people are not successful the first few times they try to quit. Do not get mad at yourself if you smoke again. Make a list of things you learned and think about when you want to try again, such as next week, next month, or next year. Where can you learn more? Go to http://georgia-grant.info/. Enter P364 in the search box to learn more about \"Stopping Smoking: Care Instructions. \" Current as of: March 20, 2017 Content Version: 11.3 © 0093-5208 [employer]. Care instructions adapted under license by [employer] (which disclaims liability or warranty for this information). If you have questions about a medical condition or this instruction, always ask your healthcare professional. [address] any warranty or liability for your use of this information. Vaccine Information Statement Influenza (Flu) Vaccine (Inactivated or Recombinant): What you need to know Many Vaccine Information Statements are available in French and other languages. See www.immunize.org/vis Hojas de Información Sobre Vacunas están disponibles en Español y en muchos otros idiomas. Visite www.immunize.org/vis 1. Why get vaccinated? Influenza (flu) is a contagious disease that spreads around the [place] every year, usually between October and May. Flu is caused by influenza viruses, and is spread mainly by coughing, sneezing, and close contact. Anyone can get flu. Flu strikes suddenly and can last several days. Symptoms vary by age, but can include: 
 fever/chills  sore throat  muscle aches  fatigue  cough  headache  runny or stuffy nose Flu can also lead to pneumonia and blood infections, and cause diarrhea and seizures in children. If you have a medical condition, such as heart or lung disease, flu can make it worse. Flu is more dangerous for some people.  Infants and young children, people 72years of age and older, pregnant women, and people with certain health conditions or a weakened immune system are at greatest risk. Each year thousands of people in the [facility] die from flu, and many more are hospitalized. Flu vaccine can: 
 keep you from getting flu, 
 make flu less severe if you do get it, and 
 keep you from spreading flu to your family and other people. 2. Inactivated and recombinant flu vaccines A dose of flu vaccine is recommended every flu season. Children 6 months through 6years of age may need two doses during the same flu season. Everyone else needs only one dose each flu season. Some inactivated flu vaccines contain a very small amount of a mercury-based preservative called thimerosal. Studies have not shown thimerosal in vaccines to be harmful, but flu vaccines that do not contain thimerosal are available. There is no live flu virus in flu shots. They cannot cause the flu. There are many flu viruses, and they are always changing. Each year a new flu vaccine is made to protect against three or four viruses that are likely to cause disease in the upcoming flu season. But even when the vaccine doesnt exactly match these viruses, it may still provide some protection Flu vaccine cannot prevent: 
 flu that is caused by a virus not covered by the vaccine, or 
 illnesses that look like flu but are not. It takes about 2 weeks for protection to develop after vaccination, and protection lasts through the flu season. 3. Some people should not get this vaccine Tell the person who is giving you the vaccine:  If you have any severe, life-threatening allergies. If you ever had a life-threatening allergic reaction after a dose of flu vaccine, or have a severe allergy to any part of this vaccine, you may be advised not to get vaccinated. Most, but not all, types of flu vaccine contain a small amount of egg protein.  If you ever had Guillain-Barré Syndrome (also called GBS). Some people with a history of GBS should not get this vaccine. This should be discussed with your doctor.  If you are not feeling well. It is usually okay to get flu vaccine when you have a mild illness, but you might be asked to come back when you feel better. 4. Risks of a vaccine reaction With any medicine, including vaccines, there is a chance of reactions. These are usually mild and go away on their own, but serious reactions are also possible. Most people who get a flu shot do not have any problems with it. Minor problems following a flu shot include:  
 soreness, redness, or swelling where the shot was given  hoarseness  sore, red or itchy eyes  cough  fever  aches  headache  itching  fatigue If these problems occur, they usually begin soon after the shot and last 1 or 2 days. More serious problems following a flu shot can include the following:  There may be a small increased risk of Guillain-Barré Syndrome (GBS) after inactivated flu vaccine. This risk has been estimated at 1 or 2 additional cases per million people vaccinated. This is much lower than the risk of severe complications from flu, which can be prevented by flu vaccine.  Young children who get the flu shot along with pneumococcal vaccine (PCV13) and/or DTaP vaccine at the same time might be slightly more likely to have a seizure caused by fever. Ask your doctor for more information. Tell your doctor if a child who is getting flu vaccine has ever had a seizure. Problems that could happen after any injected vaccine:  People sometimes faint after a medical procedure, including vaccination. Sitting or lying down for about 15 minutes can help prevent fainting, and injuries caused by a fall. Tell your doctor if you feel dizzy, or have vision changes or ringing in the ears.  Some people get severe pain in the shoulder and have difficulty moving the arm where a shot was given. This happens very rarely.  Any medication can cause a severe allergic reaction. Such reactions from a vaccine are very rare, estimated at about 1 in a million doses, and would happen within a few minutes to a few hours after the vaccination. As with any medicine, there is a very remote chance of a vaccine causing a serious injury or death. The safety of vaccines is always being monitored. For more information, visit: www.cdc.gov/vaccinesafety/ 
 
5. What if there is a serious reaction? What should I look for?  Look for anything that concerns you, such as signs of a severe allergic reaction, very high fever, or unusual behavior. Signs of a severe allergic reaction can include hives, swelling of the face and throat, difficulty breathing, a fast heartbeat, dizziness, and weakness  usually within a few minutes to a few hours after the vaccination. What should I do?  If you think it is a severe allergic reaction or other emergency that cant wait, call 9-1-1 and get the person to the nearest hospital. Otherwise, call your doctor.  Reactions should be reported to the Vaccine Adverse Event Reporting System (VAERS). Your doctor should file this report, or you can do it yourself through  the VAERS web site at www.vaers. [facility].gov, or by calling 4-959.130.4031. VAERS does not give medical advice. 6. The National Vaccine Injury Compensation Program 
 
The [facility] Vaccine Injury Compensation Program (VICP) is a federal program that was created to compensate people who may have been injured by certain vaccines. Persons who believe they may have been injured by a vaccine can learn about the program and about filing a claim by calling 7-806.989.4713 or visiting the [employer] website at www.[facility].gov/vaccinecompensation. There is a time limit to file a claim for compensation. 7. How can I learn more?  Ask your healthcare provider. He or she can give you the vaccine package insert or suggest other sources of information.  Call your local or state health department.  Contact the Centers for Disease Control and Prevention (CDC): 
- Call 2-452-052-143-386-3332 (2-932-IMC-INFO) or 
- Visit CDCs website at www.cdc.gov/flu Vaccine Information Statement Inactivated Influenza Vaccine 8/7/2015 
42 DANILO Morales [id number] [facility] [place] Centers for Disease Control and Prevention Office Use Only Vaccine Information Statement Pneumococcal Polysaccharide Vaccine: What You Need to Know Many Vaccine Information Statements are available in Malagasy and other languages. See www.immunize.org/vis. Hojas de información Sobre Vacunas están disponibles en español y en muchos otros idiomas. Visite Aguialr.si. 1. Why get vaccinated? Vaccination can protect older adults (and some children and younger adults) from pneumococcal disease. Pneumococcal disease is caused by bacteria that can spread from person to person through close contact. It can cause ear infections, and it can also lead to more serious infections of the: 
 Lungs (pneumonia),  Blood (bacteremia), and 
 Covering of the brain and spinal cord (meningitis). Meningitis can cause deafness and brain damage, and it can be fatal.   
 
Anyone can get pneumococcal disease, but children under 3years of age, people with certain medical conditions, adults over 72years of age, and cigarette smokers are at the highest risk. About 18,000 older adults die each year from pneumococcal disease in the [place]. Treatment of pneumococcal infections with penicillin and other drugs used to be more effective. But some strains of the disease have become resistant to these drugs. This makes prevention of the disease, through vaccination, even more important. 2. Pneumococcal polysaccharide vaccine (PPSV23) Pneumococcal polysaccharide vaccine (PPSV23) protects against 23 types of pneumococcal bacteria. It will not prevent all pneumococcal disease. PPSV23 is recommended for:  All adults 72years of age and older,  Anyone 2 through 59years of age with certain long-term health problems, 
 Anyone 2 through 59years of age with a weakened immune system, 
 Adults 23 through 59years of age who smoke cigarettes or have asthma. Most people need only one dose of PPSV. A second dose is recommended for certain high-risk groups. People 72 and older should get a dose even if they have gotten one or more doses of the vaccine before they turned 65. Your healthcare provider can give you more information about these recommendations. Most healthy adults develop protection within 2 to 3 weeks of getting the shot. 3. Some people should not get this vaccine  Anyone who has had a life-threatening allergic reaction to PPSV should not get another dose.  Anyone who has a severe allergy to any component of PPSV should not receive it. Tell your provider if you have any severe allergies.  Anyone who is moderately or severely ill when the shot is scheduled may be asked to wait until they recover before getting the vaccine. Someone with a mild illness can usually be vaccinated.  Children less than 3years of age should not receive this vaccine.  There is no evidence that PPSV is harmful to either a pregnant woman or to her fetus. However, as a precaution, women who need the vaccine should be vaccinated before becoming pregnant, if possible. 4. Risks of a vaccine reaction With any medicine, including vaccines, there is a chance of side effects. These are usually mild and go away on their own, but serious reactions are also possible.   
 
About half of people who get PPSV have mild side effects, such as redness or pain where the shot is given, which go away within about two days. Less than 1 out of 100 people develop a fever, muscle aches, or more severe local reactions. Problems that could happen after any vaccine:  People sometimes faint after a medical procedure, including vaccination. Sitting or lying down for about 15 minutes can help prevent fainting, and injuries caused by a fall. Tell your doctor if you feel dizzy, or have vision changes or ringing in the ears.  Some people get severe pain in the shoulder and have difficulty moving the arm where a shot was given. This happens very rarely.  Any medication can cause a severe allergic reaction. Such reactions from a vaccine are very rare, estimated at about 1 in a million doses, and would happen within a few minutes to a few hours after the vaccination. As with any medicine, there is a very remote chance of a vaccine causing a serious injury or death. The safety of vaccines is always being monitored. For more information, visit: www.cdc.gov/vaccinesafety/  
 
5. What if there is a serious reaction? What should I look for? Look for anything that concerns you, such as signs of a severe allergic reaction, very high fever, or unusual behavior. Signs of a severe allergic reaction can include hives, swelling of the face and throat, difficulty breathing, a fast heartbeat, dizziness, and weakness. These would usually start a few minutes to a few hours after the vaccination. What should I do? If you think it is a severe allergic reaction or other emergency that cant wait, call 9-1-1 or get to the nearest hospital. Otherwise, call your doctor. Afterward, the reaction should be reported to the Vaccine Adverse Event Reporting System (VAERS). Your doctor might file this report, or you can do it yourself through the VAERS web site at www.vaers. [facility].gov, or by calling 6-112.697.4267. VAERS does not give medical advice. 6. How can I learn more?  Ask your doctor. He or she can give you the vaccine package insert or suggest other sources of information.  Call your local or state health department.  Contact the Centers for Disease Control and Prevention (CDC): 
- Call 5-285.414.3590 (1-800-CDC-INFO) or 
- Visit CDCs website at www.cdc.gov/vaccines Vaccine Information Statement PPSV  
04/24/2015 Department of [facility] and [employer] Centers for Disease Control and Prevention Office Use Only Introducing [facility] SERVICES! Alida Zamora introduces Any.DO patient portal. Now you can access parts of your medical record, email your doctor's office, and request medication refills online. 1. In your internet browser, go to [email or web address] 2. Click on the First Time User? Click Here link in the Sign In box. You will see the New Member Sign Up page. 3. Enter your Any[employer]DO Access Code exactly as it appears below. You will not need to use this code after youve completed the sign-up process. If you do not sign up before the expiration date, you must request a new code. · Any.DO Access Code: [email or web address] Expires: 12/5/2017 12:54 PM 
 
4. Enter the last four digits of your Social Security Number (xxxx) and Date of Birth (mm/dd/yyyy) as indicated and click Submit. You will be taken to the next sign-up page. 5. Create a Any.DO ID. This will be your Any.DO login ID and cannot be changed, so think of one that is secure and easy to remember. 6. Create a Any.DO password. You can change your password at any time. 7. Enter your Password Reset Question and Answer. This can be used at a later time if you forget your password. 8. Enter your e-mail address. You will receive e-mail notification when new information is available in [address] 9. Click Sign Up. You can now view and download portions of your medical record. 10. Click the Download Summary menu link to download a portable copy of your medical information. If you have questions, please visit the Frequently Asked Questions section of the [employer] website. Remember, [employer] is NOT to be used for urgent needs. For medical emergencies, dial 911. Now available from your iPhone and Android! Please provide this summary of care documentation to your next provider. Your primary care clinician is listed as LUCIA Rodriguez. If you have any questions after today's visit, please call [phone].

## 2017-09-06 NOTE — PROGRESS NOTES
This note will not be viewable in [address] Frandy Ayon is a 80 y.o. male and presents with Blood Pressure Check (RM 1 - 6 mo follow up) and Cholesterol Problem  . Subjective:  Mr. Jasbir Saucedo presents today for follow-up of hypertension hyperlipidemia and Parkinson's disease. He is also seen by neurology and is on Azilect and Sinemet. Although his movement is slow and he is walking with a cane he appears to be doing fairly well. He denies any complaints related to his medications. He has no apparent side effects. He denies any headache chest pain palpitations PND orthopnea or pedal edema. He has no nausea vomiting. He is denied any urinary frequency or urgency. He notes no change in his bowel habits. Past Medical History:   Diagnosis Date    Arthritis of ankle 8/16/2017    Cancer [facility])     Prostate. 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    Edema 8/16/2017    Elevated cholesterol 8/16/2017    Fatigue 8/16/2017    Gastrointestinal disorder     ulcer    Hypertension     On statin therapy 8/16/2017    Parkinsons disease ([facility] [address] 8/16/2017    Pernicious anemia 8/16/2017    Prostate cancer ([facility] [address] 8/16/2017    Raynaud disease 8/16/2017    Seizure disorder ([facility] [address] 8/16/2017    Tobacco abuse 8/16/2017    Weight loss 8/16/2017     Past Surgical History:   Procedure Laterality Date    ABDOMEN SURGERY PROC UNLISTED      Ulcer.  HX PROSTATECTOMY       Allergies   Allergen Reactions    Pcn [Penicillins] Swelling     Current Outpatient Prescriptions   Medication Sig Dispense Refill    carbidopa-levodopa (SINEMET)  mg per tablet TAKE 2 TABS BY MOUTH THREE (3) TIMES DAILY. PATIENT MUST MAKE APPOINTMENT FOR FURTHER REFILLS 180 Tab 0    enalapril (VASOTEC) 5 mg tablet Take 1 Tab by mouth daily. 90 Tab 3    hydroCHLOROthiazide (HYDRODIURIL) 25 mg tablet Take 25 mg by mouth daily.  OXYBUTYNIN CHLORIDE PO Take  by mouth.  tamsulosin (FLOMAX) 0.4 mg capsule Take 0.4 mg by mouth daily.  gentamicin (GARAMYCIN) 0.3 % (3 mg/gram) ophthalmic ointment Administer 0.5 Inches to both eyes three (3) times daily.  MIRABEGRON (MYRBETRIQ PO) Take  by mouth.  methylPREDNISolone (MEDROL) 4 mg tab Take  by mouth daily (with breakfast).  diclofenac (VOLTAREN XR) 100 mg ER tablet Take 100 mg by mouth daily.  aspirin-dipyridamole (AGGRENOX)  mg per SR capsule Take 1 Cap by mouth two (2) times a day. PATIENT MUST CALL TO MAKE APPOINTMENT FOR FURTHER REFILLS 180 Cap 0    aspirin-dipyridamole (AGGRENOX)  mg per SR capsule TAKE ONE CAPSULE BY MOUTH TWICE A DAY 60 Cap 6    rasagiline (AZILECT) 1 mg tablet TAKE 1 TABLET BY MOUTH DAILY 30 Tab 5    levETIRAcetam (KEPPRA) 500 mg tablet TAKE 1 TABLET BY MOUTH IN THE MORNING. THEN TAKE 2 TABLETS BY MOUTH AT BEDTIME. 90 Tab 3    levETIRAcetam (KEPPRA) 500 mg tablet TAKE 1 TABLET BY MOUTH IN THE MORNING. THEN TAKE 2 TABLETS BY MOUTH AT BEDTIME. 90 Tab 3    Cholecalciferol, Vitamin D3, (VITAMIN D3) 1,000 unit cap Take  by mouth.  multivitamin with iron (DAILY MULTI-VITAMINS/IRON) tablet Take 1 Tab by mouth daily.  pravastatin (PRAVACHOL) 80 mg tablet Take 1 Tab by mouth nightly. 30 Tab 6    folic acid-vit C7-NUE P09 (FOLTX) 2.5-25-2 mg tablet Take 1 Tab by mouth daily. 30 Tab 6    silodosin (RAPAFLO) 8 mg capsule Take 8 mg by mouth daily (with breakfast).  amLODIPine (NORVASC) 10 mg tablet Take 10 mg by mouth daily.          Social History     Social History    Marital status: SINGLE     Spouse name: N/A    Number of children: N/A    Years of education: N/A     Social History Main Topics    Smoking status: Current Every Day Smoker     Packs/day: 0.50    Smokeless tobacco: Never Used    Alcohol use No    Drug use: No    Sexual activity: Not Asked     Other Topics Concern    None     Social History Narrative     Family History   Problem Relation Age of Onset    Dementia Mother     Stroke Father     Heart Disease Sister     Hypertension Sister     Hypertension Brother     Arthritis-osteo Brother     Heart Disease Brother     COPD Brother     Hypertension Child        Review of Systems  Constitutional:  negative for fevers, chills, anorexia and weight loss  Eyes:    negative for visual disturbance and irritation  ENT:    negative for tinnitus,sore throat,nasal congestion,ear pains. hoarseness  Respiratory:     negative for cough, hemoptysis, dyspnea,wheezing  CV:    negative for chest pain, palpitations, lower extremity edema  GI:    negative for nausea, vomiting, diarrhea, abdominal pain,melena  Endo:               negative for polyuria,polydipsia,polyphagia,heat intolerance  Genitourinary : negative for frequency, dysuria and hematuria  Integumentary: negative for rash and pruritus  Hematologic:   negative for easy bruising and gum/nose bleeding  Musculoskel:  negative for myalgias, arthralgias, back pain, muscle weakness, joint pain  Neurological:   negative for headaches, dizziness, vertigo, memory problems and gait   Behavl/Psych:  negative for feelings of anxiety, depression, mood changes  ROS otherwise negative      Objective:  Visit Vitals    /69 (BP 1 Location: Left arm, BP Patient Position: Sitting)    Ht 5' 7\" (1.702 m)    Wt 139 lb (63 kg)    BMI 21.77 kg/m2     Physical Exam:   General appearance - alert, well appearing, and in no distress  Mental status - alert, oriented to person, place, and time  EYE-YARY, EOMI, fundi normal, corneas normal, no foreign bodies  ENT-ENT exam normal, no neck nodes or sinus tenderness  Nose - normal and patent, no erythema, discharge or polyps  Mouth - mucous membranes moist, pharynx normal without lesions  Neck - supple, no significant adenopathy   Chest - clear to auscultation, no wheezes, rales or rhonchi, symmetric air entry   Heart - normal rate, regular rhythm, normal S1, S2, no murmurs, rubs, clicks or gallops   Abdomen - soft, nontender, nondistended, no masses or organomegaly  Lymph- no adenopathy palpable  Ext-peripheral pulses normal, no pedal edema, no clubbing or cyanosis  Skin-Warm and dry. no hyperpigmentation, vitiligo, or suspicious lesions  Neuro -alert, oriented, normal speech, grossly nonfocal mild rigidity without resting or intention tremor      Assessment/Plan:  Diagnoses and all orders for this visit:    1. Seizure disorder ([facility]    2. Parkinsons disease ([facility]    3. Essential hypertension  -     AMB POC COMPREHENSIVE METABOLIC PANEL  -     AMB POC URINALYSIS DIP STICK AUTO W/ MICRO     4. Cerebrovascular disease    5. On statin therapy  -     AMB POC CK (CPK)    6. Elevated cholesterol  -     AMB POC LIPID PROFILE    7. Tobacco abuse    8. Anemia, unspecified  -     AMB POC COMPLETE CBC,AUTOMATED ENTER    9. Encounter for immunization  -     Influenza virus vaccine ([address]) 72 years and older (72922)  -     Pneumococcal Polysaccharide vaccine, 23-Valent, Adult or Immunocompromised  -     ADMIN PNEUMOCOCCAL VACCINE  Medicare Injection Admin Charge  -     Administration fee () for Medicare insured patients          ICD-10-CM ICD-9-CM    1. Seizure disorder ([facility] [address] G40.909 345.90    2. Parkinsons disease ([facility] G20 332.0    3. Essential hypertension I10 401.9 AMB POC COMPREHENSIVE METABOLIC PANEL      AMB POC URINALYSIS DIP STICK AUTO W/ MICRO    4. Cerebrovascular disease I67.9 437.9    5. On statin therapy Z79.899 V58.69 AMB POC CK (CPK)   6. Elevated cholesterol E78.00 272.0 AMB POC LIPID PROFILE   7. Tobacco abuse Z72.0 305.1    8. Anemia, unspecified D64.9 285.9 AMB POC COMPLETE CBC,AUTOMATED ENTER   9.  Encounter for immunization Z23 V03.89 INFLUENZA VIRUS VACCINE, HIGH DOSE SEASONAL, PRESERVATIVE FREE      PNEUMOCOCCAL POLYSACCHARIDE VACCINE, 23-VALENT, ADULT OR IMMUNOSUPPRESSED PT DOSE,      ADMIN PNEUMOCOCCAL VACCINE      ADMIN INFLUENZA VIRUS VAC Plan:    Continue current medical regimen as outlined above. Follow-up lab work and make further recommendations based on the results. He will be given a Pneumovax and influenza vaccine today. Review of his old chart indicates he was given Prevnar 13 on December 2, 2015. Follow-up Disposition:  Return in about 6 months (around 3/6/2018) for follow up. I have reviewed with the patient details of the assessment and plan and all questions were answered. Relevent patient education was performed. Verbal and/or written instructions (see AVS) provided. The most recent lab findings were reviewed with the patient. An After Visit Summary was printed and given to the patient.     Alicja Ac MD

## 2017-09-06 NOTE — TELEPHONE ENCOUNTER
Future Appointments  Date Time Provider Nakita Conchita   9/6/2017 1:20 PM LUCIA Hatch MD [address] [place] [address],[facility], [id number]                         Last Appointment My Department:  4/19/2016    Please advise of refill below. Left message for patient to call back to make an appointment  Requested Prescriptions     Pending Prescriptions Disp Refills    carbidopa-levodopa (SINEMET)  mg per tablet [Pharmacy Med Name: CARBIDOPA-LEVODOPA  TAB] 180 Tab 0     Sig: TAKE 2 TABS BY MOUTH THREE (3) TIMES DAILY.  PATIENT MUST MAKE APPOINTMENT FOR FURTHER REFILLS

## 2017-09-08 DIAGNOSIS — E53.8 B12 DEFICIENCY: ICD-10-CM

## 2017-09-08 DIAGNOSIS — G20 PARKINSON DISEASE (HCC): ICD-10-CM

## 2017-09-08 DIAGNOSIS — I69.30 LATE EFFECT OF STROKE: ICD-10-CM

## 2017-09-08 DIAGNOSIS — R41.3 MEMORY LOSS: ICD-10-CM

## 2017-09-08 DIAGNOSIS — R27.0 ATAXIA: ICD-10-CM

## 2017-09-08 DIAGNOSIS — R56.9 CONVULSIONS, UNSPECIFIED CONVULSION TYPE (HCC): ICD-10-CM

## 2017-09-08 DIAGNOSIS — R55 VASOVAGAL SYNCOPE: ICD-10-CM

## 2017-09-08 RX ORDER — CARBIDOPA AND LEVODOPA 25; 100 MG/1; MG/1
2 TABLET ORAL 3 TIMES DAILY
Qty: 180 TAB | Refills: 6 | Status: SHIPPED | OUTPATIENT
Start: 2017-09-08 | End: 2017-10-25 | Stop reason: SDUPTHER

## 2017-09-08 NOTE — TELEPHONE ENCOUNTER
Requested Prescriptions     Pending Prescriptions Disp Refills    carbidopa-levodopa (SINEMET)  mg per tablet 180 Tab 6     Sig: Take 2 Tabs by mouth three (3) times daily.        Last Refill: unknown  Next Appointment:no fu

## 2017-09-29 RX ORDER — RASAGILINE 1 MG/1
TABLET ORAL
Qty: 30 TAB | Refills: 5 | Status: SHIPPED | OUTPATIENT
Start: 2017-09-29 | End: 2018-03-22 | Stop reason: SDUPTHER

## 2017-09-29 NOTE — TELEPHONE ENCOUNTER
Future Appointments  Date Time Provider Department Center   3/9/2018 3:20 PM Oneal Servin MD [address]                         Last Appointment My Department:  4/19/2016    Please advise of refill below.    Requested Prescriptions     Pending Prescriptions Disp Refills    rasagiline (AZILECT) 1 mg tablet [Pharmacy Med Name: RASAGILINE MESYLATE 1 MG TAB] 30 Tab 5     Sig: TAKE 1 TABLET BY MOUTH DAILY

## 2017-10-11 DIAGNOSIS — G20 PARKINSON DISEASE (HCC): ICD-10-CM

## 2017-10-11 DIAGNOSIS — I69.30 LATE EFFECT OF STROKE: ICD-10-CM

## 2017-10-11 DIAGNOSIS — R27.0 ATAXIA: ICD-10-CM

## 2017-10-11 DIAGNOSIS — R55 VASOVAGAL SYNCOPE: ICD-10-CM

## 2017-10-11 DIAGNOSIS — R56.9 CONVULSIONS, UNSPECIFIED CONVULSION TYPE (HCC): ICD-10-CM

## 2017-10-11 DIAGNOSIS — E53.8 B12 DEFICIENCY: ICD-10-CM

## 2017-10-11 DIAGNOSIS — R41.3 MEMORY LOSS: ICD-10-CM

## 2017-10-11 NOTE — TELEPHONE ENCOUNTER
Future Appointments  Date Time Provider Department Center   3/9/2018 3:20 PM Rachelle Escamilla MD [address]                         Last Appointment My Department:  4/19/2016    Please advise of refill below.   Requested Prescriptions     Pending Prescriptions Disp Refills    aspirin-dipyridamole (AGGRENOX)  mg per SR capsule 180 Cap 2     Sig: TAKE ONE CAPSULE BY MOUTH TWICE A DAY

## 2017-10-12 RX ORDER — ASPIRIN AND DIPYRIDAMOLE 25; 200 MG/1; MG/1
CAPSULE, EXTENDED RELEASE ORAL
Qty: 180 CAP | Refills: 2 | Status: SHIPPED | OUTPATIENT
Start: 2017-10-12 | End: 2018-03-22 | Stop reason: SDUPTHER

## 2017-10-25 DIAGNOSIS — I65.23 STENOSIS OF BOTH CAROTID ARTERIES WITHOUT CEREBRAL INFARCTION: ICD-10-CM

## 2017-10-25 DIAGNOSIS — F02.80 DEMENTIA OF THE ALZHEIMER'S TYPE WITH LATE ONSET WITHOUT BEHAVIORAL DISTURBANCE (HCC): ICD-10-CM

## 2017-10-25 DIAGNOSIS — E53.8 B12 DEFICIENCY: ICD-10-CM

## 2017-10-25 DIAGNOSIS — Z86.73 HISTORY OF CVA (CEREBROVASCULAR ACCIDENT): ICD-10-CM

## 2017-10-25 DIAGNOSIS — I69.30 LATE EFFECT OF STROKE: ICD-10-CM

## 2017-10-25 DIAGNOSIS — I65.23 ASYMPTOMATIC CAROTID ARTERY STENOSIS, BILATERAL: ICD-10-CM

## 2017-10-25 DIAGNOSIS — G30.1 DEMENTIA OF THE ALZHEIMER'S TYPE WITH LATE ONSET WITHOUT BEHAVIORAL DISTURBANCE (HCC): ICD-10-CM

## 2017-10-25 DIAGNOSIS — R56.9 CONVULSIONS, UNSPECIFIED CONVULSION TYPE (HCC): ICD-10-CM

## 2017-10-25 DIAGNOSIS — G20 PARKINSON DISEASE (HCC): ICD-10-CM

## 2017-10-25 DIAGNOSIS — R41.3 MEMORY LOSS: ICD-10-CM

## 2017-10-25 DIAGNOSIS — R55 VASOVAGAL SYNCOPE: ICD-10-CM

## 2017-10-25 DIAGNOSIS — R27.0 ATAXIA: ICD-10-CM

## 2017-10-25 RX ORDER — CARBIDOPA AND LEVODOPA 25; 100 MG/1; MG/1
2 TABLET ORAL 3 TIMES DAILY
Qty: 540 TAB | Refills: 1 | Status: SHIPPED | OUTPATIENT
Start: 2017-10-25 | End: 2017-12-11 | Stop reason: SDUPTHER

## 2017-10-25 NOTE — TELEPHONE ENCOUNTER
Future Appointments  Date Time Provider Department Center   3/9/2018 3:20 PM Allison Fatima MD [address]                         Last Appointment My Department:  4/19/2016    Please advise of refill below. Dr Darian Valdez filled script, but they are requesting 90 day fill instead of 30 day fill. Requested Prescriptions     Pending Prescriptions Disp Refills    carbidopa-levodopa (SINEMET)  mg per tablet 540 Tab 1     Sig: Take 2 Tabs by mouth three (3) times daily.

## 2017-11-10 RX ORDER — LEVETIRACETAM 500 MG/1
TABLET ORAL
Qty: 90 TAB | Refills: 3 | Status: SHIPPED | OUTPATIENT
Start: 2017-11-10 | End: 2018-03-22 | Stop reason: CLARIF

## 2017-11-10 RX ORDER — MIRABEGRON 25 MG/1
TABLET, FILM COATED, EXTENDED RELEASE ORAL
Qty: 30 TAB | Refills: 6 | Status: SHIPPED | OUTPATIENT
Start: 2017-11-10 | End: 2017-12-11 | Stop reason: SDUPTHER

## 2017-12-11 DIAGNOSIS — I69.30 LATE EFFECT OF STROKE: ICD-10-CM

## 2017-12-11 DIAGNOSIS — E53.8 B12 DEFICIENCY: ICD-10-CM

## 2017-12-11 DIAGNOSIS — R56.9 CONVULSIONS, UNSPECIFIED CONVULSION TYPE (HCC): ICD-10-CM

## 2017-12-11 DIAGNOSIS — G20 PARKINSON DISEASE (HCC): ICD-10-CM

## 2017-12-11 DIAGNOSIS — R27.0 ATAXIA: ICD-10-CM

## 2017-12-11 DIAGNOSIS — R55 VASOVAGAL SYNCOPE: ICD-10-CM

## 2017-12-11 DIAGNOSIS — R41.3 MEMORY LOSS: ICD-10-CM

## 2017-12-11 RX ORDER — CARBIDOPA AND LEVODOPA 25; 100 MG/1; MG/1
2 TABLET ORAL 3 TIMES DAILY
Qty: 540 TAB | Refills: 1 | Status: SHIPPED | OUTPATIENT
Start: 2017-12-11 | End: 2018-01-08 | Stop reason: SDUPTHER

## 2017-12-11 NOTE — TELEPHONE ENCOUNTER
Requested Prescriptions     Pending Prescriptions Disp Refills    carbidopa-levodopa (SINEMET)  mg per tablet 540 Tab 1     Sig: Take 2 Tabs by mouth three (3) times daily.  mirabegron ER (MYRBETRIQ) 25 mg ER tablet 30 Tab 6     Sig: Take 1 Tab by mouth daily.        Last Refill: 10/25/17, 11/10/17  Next Appointment:03/09/18

## 2017-12-12 RX ORDER — LEVETIRACETAM 500 MG/1
TABLET ORAL
Qty: 270 TAB | Refills: 3 | Status: SHIPPED | OUTPATIENT
Start: 2017-12-12 | End: 2018-03-22 | Stop reason: CLARIF

## 2018-01-08 DIAGNOSIS — R27.0 ATAXIA: ICD-10-CM

## 2018-01-08 DIAGNOSIS — I69.30 LATE EFFECT OF STROKE: ICD-10-CM

## 2018-01-08 DIAGNOSIS — R56.9 CONVULSIONS, UNSPECIFIED CONVULSION TYPE (HCC): ICD-10-CM

## 2018-01-08 DIAGNOSIS — R55 VASOVAGAL SYNCOPE: ICD-10-CM

## 2018-01-08 DIAGNOSIS — E53.8 B12 DEFICIENCY: ICD-10-CM

## 2018-01-08 DIAGNOSIS — R41.3 MEMORY LOSS: ICD-10-CM

## 2018-01-08 DIAGNOSIS — G20 PARKINSON DISEASE (HCC): ICD-10-CM

## 2018-01-08 NOTE — TELEPHONE ENCOUNTER
Requested Prescriptions     Pending Prescriptions Disp Refills    carbidopa-levodopa (SINEMET)  mg per tablet 540 Tab 1     Sig: Take 2 Tabs by mouth three (3) times daily.         Last Refill: 12/11/17  Next Appointment:03/09/18

## 2018-01-09 RX ORDER — CARBIDOPA AND LEVODOPA 25; 100 MG/1; MG/1
2 TABLET ORAL 3 TIMES DAILY
Qty: 540 TAB | Refills: 1 | Status: SHIPPED | OUTPATIENT
Start: 2018-01-09 | End: 2018-03-22 | Stop reason: SDUPTHER

## 2018-01-11 RX ORDER — TAMSULOSIN HYDROCHLORIDE 0.4 MG/1
CAPSULE ORAL
Qty: 30 CAP | Refills: 6 | Status: SHIPPED | OUTPATIENT
Start: 2018-01-11 | End: 2018-08-17 | Stop reason: SDUPTHER

## 2018-02-26 RX ORDER — PRAVASTATIN SODIUM 40 MG/1
TABLET ORAL
Qty: 90 TAB | Refills: 3 | Status: SHIPPED | OUTPATIENT
Start: 2018-02-26 | End: 2019-02-25 | Stop reason: SDUPTHER

## 2018-03-06 ENCOUNTER — OFFICE VISIT (OUTPATIENT)
Dept: INTERNAL MEDICINE CLINIC | Age: 83
End: 2018-03-06

## 2018-03-06 VITALS
BODY MASS INDEX: 22.1 KG/M2 | HEART RATE: 74 BPM | TEMPERATURE: 98.8 F | HEIGHT: 67 IN | DIASTOLIC BLOOD PRESSURE: 73 MMHG | WEIGHT: 140.8 LBS | SYSTOLIC BLOOD PRESSURE: 151 MMHG | OXYGEN SATURATION: 98 % | RESPIRATION RATE: 18 BRPM

## 2018-03-06 DIAGNOSIS — Z86.73 HISTORY OF CVA (CEREBROVASCULAR ACCIDENT): ICD-10-CM

## 2018-03-06 DIAGNOSIS — I10 ESSENTIAL HYPERTENSION: Primary | Chronic | ICD-10-CM

## 2018-03-06 DIAGNOSIS — J06.9 VIRAL UPPER RESPIRATORY TRACT INFECTION: ICD-10-CM

## 2018-03-06 DIAGNOSIS — G40.909 SEIZURE DISORDER (HCC): ICD-10-CM

## 2018-03-06 DIAGNOSIS — Z79.899 ON STATIN THERAPY: ICD-10-CM

## 2018-03-06 DIAGNOSIS — E78.00 ELEVATED CHOLESTEROL: ICD-10-CM

## 2018-03-06 DIAGNOSIS — G20 PARKINSON DISEASE (HCC): ICD-10-CM

## 2018-03-06 NOTE — PROGRESS NOTES
This note will not be viewable in [address] Meliton Cope is a 80 y.o. male and presents with Cold Symptoms  . Subjective:  Mr. Ana Paula Levi presents today with complaint of congestion and coryza which has largely subsided since his symptoms began last week. He had no fever chills or rigors. He has slight cough that was minimally productive with clear sputum. He is not taking medication for this. His symptoms have almost completely subsided at this point. He also follows up for multiple problems including hypertension, hyperlipidemia, Parkinson's disease, history of prostate cancer, history of seizure disorder, history of detrusor dysfunction. He is doing well on his current medical regimen. He is not sure when his next follow-up appointment with his neurologist is. He denies symptoms of headache, chest pain, shortness breath, PND, orthopnea, or pedal edema. Past Medical History:   Diagnosis Date    Arthritis of ankle 8/16/2017    Cancer [facility])     Prostate. 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 8/16/2017    Pernicious anemia 8/16/2017    Prostate cancer ([facility] 8/16/2017    Raynaud disease 8/16/2017    Seizure disorder ([facility] 8/16/2017    Tobacco abuse 8/16/2017    Weight loss 8/16/2017     Past Surgical History:   Procedure Laterality Date    ABDOMEN SURGERY PROC UNLISTED      Ulcer.  HX PROSTATECTOMY       Allergies   Allergen Reactions    Pcn [Penicillins] Swelling     Current Outpatient Prescriptions   Medication Sig Dispense Refill    aspirin-dipyridamole (AGGRENOX)  mg per SR capsule Take 1 Cap by mouth two (2) times a day.  PATIENT MUST CALL TO MAKE APPOINTMENT FOR FURTHER REFILLS 180 Cap 0    pravastatin (PRAVACHOL) 40 mg tablet TAKE 1 TABLET BY MOUTH DAILY 90 Tab 3    tamsulosin (FLOMAX) 0.4 mg capsule TAKE ONE CAPSULE BY MOUTH DAILY 30 Cap 6    carbidopa-levodopa (SINEMET)  mg per tablet Take 2 Tabs by mouth three (3) times daily. 540 Tab 1    mirabegron ER (MYRBETRIQ) 25 mg ER tablet Take 1 Tab by mouth daily. 90 Tab 3    levETIRAcetam (KEPPRA) 500 mg tablet TAKE 1 TABLET BY MOUTH IN THE MORNING. THEN TAKE 2 TABLETS BY MOUTH AT BEDTIME. 270 Tab 3    levETIRAcetam (KEPPRA) 500 mg tablet TAKE 1 TABLET BY MOUTH IN THE MORNING. THEN TAKE 2 TABLETS BY MOUTH AT BEDTIME. 90 Tab 3    aspirin-dipyridamole (AGGRENOX)  mg per SR capsule TAKE ONE CAPSULE BY MOUTH TWICE A  Cap 2    rasagiline (AZILECT) 1 mg tablet TAKE 1 TABLET BY MOUTH DAILY 30 Tab 5    enalapril (VASOTEC) 5 mg tablet Take 1 Tab by mouth daily. 90 Tab 3    hydroCHLOROthiazide (HYDRODIURIL) 25 mg tablet Take 25 mg by mouth daily.  OXYBUTYNIN CHLORIDE PO Take  by mouth.  gentamicin (GARAMYCIN) 0.3 % (3 mg/gram) ophthalmic ointment Administer 0.5 Inches to both eyes three (3) times daily.  MIRABEGRON (MYRBETRIQ PO) Take  by mouth.  methylPREDNISolone (MEDROL) 4 mg tab Take  by mouth daily (with breakfast).  diclofenac (VOLTAREN XR) 100 mg ER tablet Take 100 mg by mouth daily.  Cholecalciferol, Vitamin D3, (VITAMIN D3) 1,000 unit cap Take  by mouth.  multivitamin with iron (DAILY MULTI-VITAMINS/IRON) tablet Take 1 Tab by mouth daily.  pravastatin (PRAVACHOL) 80 mg tablet Take 1 Tab by mouth nightly. 30 Tab 6    folic acid-vit V8-PMT P50 (FOLTX) 2.5-25-2 mg tablet Take 1 Tab by mouth daily. 30 Tab 6    silodosin (RAPAFLO) 8 mg capsule Take 8 mg by mouth daily (with breakfast).  amLODIPine (NORVASC) 10 mg tablet Take 10 mg by mouth daily.          Social History     Social History    Marital status: SINGLE     Spouse name: N/A    Number of children: N/A    Years of education: N/A     Social History Main Topics    Smoking status: Current Every Day Smoker     Packs/day: 0.50    Smokeless tobacco: Never Used    Alcohol use No    Drug use: No    Sexual activity: Not Currently     Other Topics Concern    None     Social History Narrative     Family History   Problem Relation Age of Onset    Dementia Mother     Stroke Father     Heart Disease Sister     Hypertension Sister     Hypertension Brother     Arthritis-osteo Brother     Heart Disease Brother     COPD Brother     Hypertension Child        Review of Systems  Constitutional:  negative for fevers, chills, anorexia and weight loss  Eyes:    negative for visual disturbance and irritation  ENT:    negative for tinnitus,sore throat,nasal congestion,ear pains. hoarseness  Respiratory:     negative for cough, hemoptysis, dyspnea,wheezing  CV:    negative for chest pain, palpitations, lower extremity edema  GI:    negative for nausea, vomiting, diarrhea, abdominal pain,melena  Endo:               negative for polyuria,polydipsia,polyphagia,heat intolerance  Genitourinary : negative for frequency, dysuria and hematuria  Integumentary: negative for rash and pruritus  Hematologic:   negative for easy bruising and gum/nose bleeding  Musculoskel:  negative for myalgias, arthralgias, back pain, muscle weakness, joint pain  Neurological:   negative for headaches, dizziness, vertigo, memory problems and gait   Behavl/Psych:  negative for feelings of anxiety, depression, mood changes  ROS otherwise negative      Objective:  Visit Vitals    /73 (BP 1 Location: Right arm, BP Patient Position: Sitting)    Pulse 74    Temp 98.8 °F (37.1 °C) (Oral)    Resp 18    Ht 5' 7\" (1.702 m)    Wt 140 lb 12.8 oz (63.9 kg)    SpO2 98%    BMI 22.05 kg/m2     Physical Exam:   General appearance - alert, well appearing, and in no distress  Mental status - alert, oriented to person, place, and time  EYE-YARY, EOMI, fundi normal, corneas normal, no foreign bodies  ENT-ENT exam normal, no neck nodes or sinus tenderness  Nose - normal and patent, no erythema, discharge or polyps  Mouth - mucous membranes moist, pharynx normal without lesions  Neck - supple, no significant adenopathy   Chest - clear to auscultation, no wheezes, rales or rhonchi, symmetric air entry   Heart - normal rate, regular rhythm, normal S1, S2, no murmurs, rubs, clicks or gallops   Abdomen - soft, nontender, nondistended, no masses or organomegaly  Lymph- no adenopathy palpable  Ext-peripheral pulses normal, no pedal edema, no clubbing or cyanosis  Skin-Warm and dry. no hyperpigmentation, vitiligo, or suspicious lesions  Neuro -alert, oriented, mildly dysarthric speech, no focal findings, shuffling gait, he uses a rolling walker, wrist are supple and there is no dystonic motion, no rolling tremor is noted      Assessment/Plan:  Diagnoses and all orders for this visit:    1. Parkinson disease ([facility]    2. Seizure disorder ([facility]    3. Essential hypertension    4. History of CVA (cerebrovascular accident)    5. On statin therapy  -     AMB POC CK (CPK)  -     AMB POC COMPREHENSIVE METABOLIC PANEL    6. Elevated cholesterol  -     AMB POC CK (CPK)  -     AMB POC COMPREHENSIVE METABOLIC PANEL          [id number] ICD-9-CM    1. Parkinson disease ([facility] G20 332.0    2. Seizure disorder ([facility] G40.909 345.90    3. Essential hypertension I10 401.9    4. History of CVA (cerebrovascular accident) Z86.73 V12.54    5. On statin therapy Z79.899 V58.69 AMB POC CK (CPK)      AMB POC COMPREHENSIVE METABOLIC PANEL   6. Elevated cholesterol E78.00 272.0 AMB POC CK (CPK)      AMB POC COMPREHENSIVE METABOLIC PANEL   Plan:    Multiple problems as outlined above. Continue current medical regimen. Follow-up in 6 months unless otherwise indicated. Preceding upper respiratory illness appears to have subsided without further intervention. If he has any lingering or recurring symptoms follow-up evaluation.       Follow-up Disposition:  Return in about 6 months (around 9/6/2018) for follow up. I have reviewed with the patient details of the assessment and plan and all questions were answered. Relevent patient education was performed. Verbal and/or written instructions (see AVS) provided. The most recent lab findings were reviewed with the patient. Plan was discussed with patient who verbally expressed understanding. An After Visit Summary was printed and given to the patient.     Татьяна Erwin MD

## 2018-03-06 NOTE — MR AVS SNAPSHOT
[address] [facility] [address] [address] [phone] Patient: Diamante Mooney. MRN: [id number]  Visit Information Date & Time Provider Department Dept. Phone Encounter #  
 3/6/2018 10:00 AM LUCIA Estrada MD [place] [id number] [phone] [id number] Follow-up Instructions Return in about 6 months (around 2018) for follow up. Your Appointments 3/22/2018 12:40 PM  
Follow Up with Ritu Saleem MD  
[facility] at [facility] [address] [facility]) Appt Note: f/u & medication refill; f/u & medication refill [facility], 
[address] [place] [address], [phone]  
[address] [facility], [address] [place] [address], [address] [place] [address] [address] [phone] Upcoming Health Maintenance Date Due DTaP/Tdap/Td series (1 - Tdap) 5/3/1955 ZOSTER VACCINE AGE 60> 3/3/1994 GLAUCOMA SCREENING Q2Y 5/3/1999 MEDICARE YEARLY EXAM 5/3/1999 Allergies as of 3/6/2018  Review Complete On: 3/6/2018 By: Sherine Palacio MD  
  
 Severity Noted Reaction Type Reactions Pcn [Penicillins]  2012    Swelling Current Immunizations  Reviewed on 2014 Name Date Influenza High Dose Vaccine PF 2017 Influenza Vaccine 2015, 2012 Pneumococcal Conjugate (PCV-13) 2015 Pneumococcal Polysaccharide (PPSV-23) 2017 Pneumococcal Vaccine (Unspecified Type) 2013, 2012 ZZZ-RETIRED (DO NOT USE) Pneumococcal Vaccine (Unspecified Type)  Deferred (Patient Refused) Not reviewed this visit You Were Diagnosed With   
  
 Codes Comments Parkinson disease ([facility]    -  Primary ICD-10-CM: G20 
ICD-9-CM: 332.0 Seizure disorder ([facility]     ICD-10-CM: G40.909 ICD-9-CM: 345.90 Essential hypertension     ICD-10-CM: I10 
ICD-9-CM: 401.9 History of CVA (cerebrovascular accident)     ICD-10-CM: Z86.73 
ICD-9-CM: V12.54 On statin therapy     ICD-10-CM: Z79.899 ICD-9-CM: V58.69 Elevated cholesterol     ICD-10-CM: E78.00 ICD-9-CM: 272.0 Vitals BP Pulse Temp Resp Height(growth percentile) Weight(growth percentile) 151/73 (BP 1 Location: Right arm, BP Patient Position: Sitting) 74 98.8 °F (37.1 °C) (Oral) 18 5' 7\" (1.702 m) 140 lb 12.8 oz (63.9 kg) SpO2 BMI Smoking Status 98% 22.05 kg/m2 Current Every Day Smoker Vitals History BMI and BSA Data Body Mass Index Body Surface Area 22.05 kg/m 2 1.74 m 2 Preferred Pharmacy Pharmacy Name Phone [id number] Galindo Salcedo [address] [place] [phone] Your Updated Medication List  
  
   
This list is accurate as of 3/6/18 11:21 AM.  Always use your most recent med list. amLODIPine 10 mg tablet Commonly known as:  Liudmila Manjinder Take 10 mg by mouth daily. * aspirin-dipyridamole  mg per SR capsule Commonly known as:  AGGRENOX Take 1 Cap by mouth two (2) times a day. PATIENT MUST CALL TO MAKE APPOINTMENT FOR FURTHER REFILLS * aspirin-dipyridamole  mg per SR capsule Commonly known as:  AGGRENOX  
TAKE ONE CAPSULE BY MOUTH TWICE A DAY  
  
 carbidopa-levodopa  mg per tablet Commonly known as:  SINEMET Take 2 Tabs by mouth three (3) times daily. DAILY MULTI-VITAMINS/IRON tablet Generic drug:  multivitamin with iron Take 1 Tab by mouth daily. diclofenac 100 mg ER tablet Commonly known as:  VOLTAREN XR Take 100 mg by mouth daily. enalapril 5 mg tablet Commonly known as:  Pickerel Gera Take 1 Tab by mouth daily. folic acid-vit X8-FBC V14 2.5-25-2 mg tablet Commonly known as:  Jessica Kaylyn Take 1 Tab by mouth daily. gentamicin 0.3 % (3 mg/gram) ophthalmic ointment Commonly known as:  GARAMYCIN  
 Administer 0.5 Inches to both eyes three (3) times daily. hydroCHLOROthiazide 25 mg tablet Commonly known as:  HYDRODIURIL Take 25 mg by mouth daily. * levETIRAcetam 500 mg tablet Commonly known as:  KEPPRA TAKE 1 TABLET BY MOUTH IN THE MORNING. THEN TAKE 2 TABLETS BY MOUTH AT BEDTIME. * levETIRAcetam 500 mg tablet Commonly known as:  KEPPRA TAKE 1 TABLET BY MOUTH IN THE MORNING. THEN TAKE 2 TABLETS BY MOUTH AT BEDTIME. MEDROL 4 mg Tab Generic drug:  methylPREDNISolone Take  by mouth daily (with breakfast). * MYRBETRIQ PO Take  by mouth. * mirabegron ER 25 mg ER tablet Commonly known as:  MYRBETRIQ Take 1 Tab by mouth daily. OXYBUTYNIN CHLORIDE PO Take  by mouth. * pravastatin 80 mg tablet Commonly known as:  PRAVACHOL Take 1 Tab by mouth nightly. * pravastatin 40 mg tablet Commonly known as:  PRAVACHOL  
TAKE 1 TABLET BY MOUTH DAILY  
  
 RAPAFLO 8 mg capsule Generic drug:  silodosin Take 8 mg by mouth daily (with breakfast). rasagiline 1 mg tablet Commonly known as:  AZILECT  
TAKE 1 TABLET BY MOUTH DAILY  
  
 tamsulosin 0.4 mg capsule Commonly known as:  FLOMAX TAKE ONE CAPSULE BY MOUTH DAILY  
  
 VITAMIN D3 1,000 unit Cap Generic drug:  cholecalciferol Take  by mouth. * Notice: This list has 8 medication(s) that are the same as other medications prescribed for you. Read the directions carefully, and ask your doctor or other care provider to review them with you. We Performed the Following AMB POC CK (CPK) [86578 CPT(R)] AMB POC COMPREHENSIVE METABOLIC PANEL [00432 CPT(R)] Follow-up Instructions Return in about 6 months (around 9/6/2018) for follow up. Introducing [facility]! [place] [employer] introduces [employer] patient portal. Now you can access parts of your medical record, email your doctor's office, and request medication refills online. 1. In your internet browser, go to [email or web address] 2. Click on the First Time User? Click Here link in the Sign In box. You will see the New Member Sign Up page. 3. Enter your eco4cloud Access Code exactly as it appears below. You will not need to use this code after youve completed the sign-up process. If you do not sign up before the expiration date, you must request a new code. · eco4cloud Access Code: [email or web address] Expires: 6/4/2018 10:03 AM 
 
4. Enter the last four digits of your Social Security Number (xxxx) and Date of Birth (mm/dd/yyyy) as indicated and click Submit. You will be taken to the next sign-up page. 5. Create a Video Passportst ID. This will be your eco4cloud login ID and cannot be changed, so think of one that is secure and easy to remember. 6. Create a eco4cloud password. You can change your password at any time. 7. Enter your Password Reset Question and Answer. This can be used at a later time if you forget your password. 8. Enter your e-mail address. You will receive e-mail notification when new information is available in [address] 9. Click Sign Up. You can now view and download portions of your medical record. 10. Click the Download Summary menu link to download a portable copy of your medical information. If you have questions, please visit the Frequently Asked Questions section of the eco4cloud website. Remember, eco4cloud is NOT to be used for urgent needs. For medical emergencies, dial 911. Now available from your iPhone and Android! Please provide this summary of care documentation to your next provider. Your primary care clinician is listed as LUCIA Marcum. If you have any questions after today's visit, please call [phone].

## 2018-03-07 LAB
ALBUMIN SERPL-MCNC: 3.8 G/DL (ref 3.9–5.4)
ALKALINE PHOS POC: 67 U/L (ref 38–126)
ALT SERPL-CCNC: 34 U/L (ref 9–52)
AST SERPL-CCNC: 76 U/L (ref 14–36)
BUN BLD-MCNC: 28 MG/DL (ref 9–20)
CALCIUM BLD-MCNC: 9.2 MG/DL (ref 8.4–10.2)
CHLORIDE BLD-SCNC: 107 MMOL/L (ref 98–107)
CK (CPK) POC: 237 U/L (ref 30–135)
CO2 POC: 31 MMOL/L (ref 22–32)
CREAT BLD-MCNC: 1.3 MG/DL (ref 0.8–1.5)
EGFR (POC): 50.5
GLUCOSE POC: 88 MG/DL (ref 75–110)
POTASSIUM SERPL-SCNC: 4.1 MMOL/L (ref 3.6–5)
PROT SERPL-MCNC: 6.7 G/DL (ref 6.3–8.2)
SODIUM SERPL-SCNC: 145 MMOL/L (ref 137–145)
TOTAL BILIRUBIN POC: 0.5 MG/DL (ref 0.2–1.3)

## 2018-03-14 RX ORDER — AMLODIPINE BESYLATE 5 MG/1
TABLET ORAL
Qty: 30 TAB | Refills: 6 | Status: SHIPPED | OUTPATIENT
Start: 2018-03-14 | End: 2018-09-21

## 2018-03-22 ENCOUNTER — OFFICE VISIT (OUTPATIENT)
Dept: NEUROLOGY | Age: 83
End: 2018-03-22

## 2018-03-22 VITALS
SYSTOLIC BLOOD PRESSURE: 124 MMHG | OXYGEN SATURATION: 98 % | DIASTOLIC BLOOD PRESSURE: 62 MMHG | HEART RATE: 60 BPM | HEIGHT: 67 IN

## 2018-03-22 DIAGNOSIS — Z86.73 HISTORY OF CVA (CEREBROVASCULAR ACCIDENT): Primary | ICD-10-CM

## 2018-03-22 DIAGNOSIS — R55 VASOVAGAL SYNCOPE: ICD-10-CM

## 2018-03-22 DIAGNOSIS — R56.9 CONVULSIONS, UNSPECIFIED CONVULSION TYPE (HCC): ICD-10-CM

## 2018-03-22 DIAGNOSIS — I65.23 STENOSIS OF BOTH CAROTID ARTERIES WITHOUT CEREBRAL INFARCTION: ICD-10-CM

## 2018-03-22 DIAGNOSIS — G20 PARKINSON DISEASE (HCC): Primary | ICD-10-CM

## 2018-03-22 DIAGNOSIS — R41.3 MEMORY LOSS: ICD-10-CM

## 2018-03-22 DIAGNOSIS — I63.312 THROMBOTIC STROKE INVOLVING LEFT MIDDLE CEREBRAL ARTERY (HCC): ICD-10-CM

## 2018-03-22 DIAGNOSIS — I65.23 ASYMPTOMATIC CAROTID ARTERY STENOSIS, BILATERAL: ICD-10-CM

## 2018-03-22 DIAGNOSIS — R27.0 ATAXIA: ICD-10-CM

## 2018-03-22 DIAGNOSIS — G20 PARKINSON DISEASE (HCC): ICD-10-CM

## 2018-03-22 DIAGNOSIS — G40.209 COMPLEX PARTIAL SEIZURE EVOLVING TO GENERALIZED SEIZURE (HCC): ICD-10-CM

## 2018-03-22 DIAGNOSIS — E53.8 B12 DEFICIENCY: ICD-10-CM

## 2018-03-22 DIAGNOSIS — I69.30 LATE EFFECT OF STROKE: ICD-10-CM

## 2018-03-22 DIAGNOSIS — F02.80 DEMENTIA OF THE ALZHEIMER'S TYPE WITH LATE ONSET WITHOUT BEHAVIORAL DISTURBANCE (HCC): ICD-10-CM

## 2018-03-22 DIAGNOSIS — G30.1 DEMENTIA OF THE ALZHEIMER'S TYPE WITH LATE ONSET WITHOUT BEHAVIORAL DISTURBANCE (HCC): ICD-10-CM

## 2018-03-22 DIAGNOSIS — Z86.73 HISTORY OF CVA (CEREBROVASCULAR ACCIDENT): ICD-10-CM

## 2018-03-22 RX ORDER — ASPIRIN AND DIPYRIDAMOLE 25; 200 MG/1; MG/1
1 CAPSULE, EXTENDED RELEASE ORAL 2 TIMES DAILY
Qty: 180 CAP | Refills: 0 | Status: SHIPPED | OUTPATIENT
Start: 2018-03-22 | End: 2018-03-22 | Stop reason: SDUPTHER

## 2018-03-22 RX ORDER — ASPIRIN AND DIPYRIDAMOLE 25; 200 MG/1; MG/1
CAPSULE, EXTENDED RELEASE ORAL
Qty: 180 CAP | Refills: 4 | Status: SHIPPED | OUTPATIENT
Start: 2018-03-22 | End: 2018-12-04 | Stop reason: SDUPTHER

## 2018-03-22 RX ORDER — RASAGILINE 1 MG/1
TABLET ORAL
Qty: 90 TAB | Refills: 5 | Status: SHIPPED | OUTPATIENT
Start: 2018-03-22 | End: 2019-02-26 | Stop reason: SDUPTHER

## 2018-03-22 RX ORDER — CARBIDOPA AND LEVODOPA 25; 100 MG/1; MG/1
2 TABLET ORAL 3 TIMES DAILY
Qty: 540 TAB | Refills: 4 | Status: SHIPPED | OUTPATIENT
Start: 2018-03-22 | End: 2019-05-26 | Stop reason: SDUPTHER

## 2018-03-22 RX ORDER — LEVETIRACETAM 500 MG/1
TABLET ORAL
Qty: 270 TAB | Refills: 5 | Status: SHIPPED | OUTPATIENT
Start: 2018-03-22 | End: 2019-04-12 | Stop reason: SDUPTHER

## 2018-03-22 RX ORDER — ZONISAMIDE 100 MG/1
100 CAPSULE ORAL DAILY
Qty: 90 CAP | Refills: 11 | Status: SHIPPED | OUTPATIENT
Start: 2018-03-22 | End: 2018-09-21

## 2018-03-22 NOTE — LETTER
3/22/2018 9:00 PM 
 
Patient:  Martha Tobar. YOB: 1934 Date of Visit: 3/22/2018 Dear No Recipients: Thank you for referring Mr. Ellen Diaz to me for evaluation/treatment. Below are the relevant portions of my assessment and plan of care. Doppler dictated in procedure note If you have questions, please do not hesitate to call me. I look forward to following Mr. Tello along with you. Sincerely, [address]

## 2018-03-22 NOTE — MR AVS SNAPSHOT
[address], 
[address], [address] [facility] 
[phone] Patient: Garett Briones. MRN: [id number] ILM:[birth year] Visit Information Date & Time Provider Department Dept. Phone Encounter #  
 3/22/2018 12:40 PM Vanessa Murphy MD Neurology Clinic at [facility] [phone] [id number] Follow-up Instructions Return in about 6 months (around 9/22/2018). Your Appointments 9/21/2018  8:50 AM  
Follow Up with LUCIA Cannon MD  
[facility] 26 ([address]) Appt Note: [address] [id number] [address] [address] Upcoming Health Maintenance Date Due DTaP/Tdap/Td series (1 - Tdap) 5/3/1955 ZOSTER VACCINE AGE 60> 3/3/1994 GLAUCOMA SCREENING Q2Y 5/3/1999 MEDICARE YEARLY EXAM 3/14/2018 Allergies as of 3/22/2018  Review Complete On: 3/22/2018 By: Vanessa Murphy MD  
  
 Severity Noted Reaction Type Reactions Pcn [Penicillins]  06/01/2012    Swelling Current Immunizations  Reviewed on 4/18/2014 Name Date Influenza High Dose Vaccine PF 9/6/2017 Influenza Vaccine 12/2/2015, 12/6/2012 Pneumococcal Conjugate (PCV-13) 12/2/2015 Pneumococcal Polysaccharide (PPSV-23) 9/6/2017 Pneumococcal Vaccine (Unspecified Type) 1/1/2013, 12/6/2012 ZZZ-RETIRED (DO NOT USE) Pneumococcal Vaccine (Unspecified Type)  Deferred (Patient Refused) Not reviewed this visit You Were Diagnosed With   
  
 Codes Comments Parkinson disease ([facility] [address]    -  Primary ICD-10-CM: G20 
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   
 B12 deficiency     ICD-10-CM: E53.8 ICD-9-CM: 266.2 Late effect of stroke     ICD-10-CM: I69.30 ICD-9-CM: 438.9 Ataxia     ICD-10-CM: R27.0 ICD-9-CM: 152. 3 Convulsions, unspecified convulsion type ([facility] [address]     ICD-10-CM: R56.9 ICD-9-CM: 780.39 Memory loss     ICD-10-CM: R41.3 ICD-9-CM: 780.93 Asymptomatic carotid artery stenosis, bilateral     ICD-10-CM: I65.23 ICD-9-CM: 433.10, 433.30 Vitals BP Pulse Height(growth percentile) SpO2 Smoking Status 124/62 60 5' 7\" (1.702 m) 98% Current Every Day Smoker Vitals History Preferred Pharmacy Pharmacy Name Phone [id number] [place] [phone] Your Updated Medication List  
  
   
This list is accurate as of 3/22/18  1:14 PM.  Always use your most recent med list.  
  
  
  
  
 * amLODIPine 5 mg tablet Commonly known as:  Saintclair Rickers TAKE 1 TABLET BY MOUTH EVERY DAY  
  
 * amLODIPine 10 mg tablet Commonly known as:  Saintclair Rickers Take 10 mg by mouth daily. * aspirin-dipyridamole  mg per SR capsule Commonly known as:  AGGRENOX Take 1 Cap by mouth two (2) times a day. PATIENT MUST CALL TO MAKE APPOINTMENT FOR FURTHER REFILLS * aspirin-dipyridamole  mg per SR capsule Commonly known as:  AGGRENOX  
TAKE ONE CAPSULE BY MOUTH TWICE A DAY  
  
 carbidopa-levodopa  mg per tablet Commonly known as:  SINEMET Take 2 Tabs by mouth three (3) times daily. DAILY MULTI-VITAMINS/IRON tablet Generic drug:  multivitamin with iron Take 1 Tab by mouth daily. diclofenac 100 mg ER tablet Commonly known as:  VOLTAREN XR Take 100 mg by mouth daily. enalapril 5 mg tablet Commonly known as:  Debcharua Hero Take 1 Tab by mouth daily. folic acid-vit E9-DBC L20 2.5-25-2 mg tablet Commonly known as:  Klarissa Randolph Take 1 Tab by mouth daily. gentamicin 0.3 % (3 mg/gram) ophthalmic ointment Commonly known as:  GARAMYCIN Administer 0.5 Inches to both eyes three (3) times daily. hydroCHLOROthiazide 25 mg tablet Commonly known as:  HYDRODIURIL Take 25 mg by mouth daily. levETIRAcetam 500 mg tablet Commonly known as:  KEPPRA 1 po qam and 2 po qhs MEDROL 4 mg Tab Generic drug:  methylPREDNISolone Take  by mouth daily (with breakfast). * MYRBETRIQ PO Take  by mouth. * mirabegron ER 25 mg ER tablet Commonly known as:  MYRBETRIQ Take 1 Tab by mouth daily. OXYBUTYNIN CHLORIDE PO Take  by mouth. * pravastatin 80 mg tablet Commonly known as:  PRAVACHOL Take 1 Tab by mouth nightly. * pravastatin 40 mg tablet Commonly known as:  PRAVACHOL  
TAKE 1 TABLET BY MOUTH DAILY  
  
 RAPAFLO 8 mg capsule Generic drug:  silodosin Take 8 mg by mouth daily (with breakfast). rasagiline 1 mg tablet Commonly known as:  AZILECT  
TAKE 1 TABLET BY MOUTH DAILY  
  
 tamsulosin 0.4 mg capsule Commonly known as:  FLOMAX TAKE ONE CAPSULE BY MOUTH DAILY  
  
 VITAMIN D3 1,000 unit Cap Generic drug:  cholecalciferol Take  by mouth. zonisamide 100 mg capsule Commonly known as:  Arleen Fernandez Take 1 Cap by mouth daily. * Notice: This list has 8 medication(s) that are the same as other medications prescribed for you. Read the directions carefully, and ask your doctor or other care provider to review them with you. Prescriptions Sent to Pharmacy Refills  
 zonisamide (ZONEGRAN) 100 mg capsule 11 Sig: Take 1 Cap by mouth daily.   
 Class: Normal  
 Pharmacy: [address] [place] [address] Ph #: [phone] Route: Oral  
 carbidopa-levodopa (SINEMET)  mg per tablet 4 Sig: Take 2 Tabs by mouth three (3) times daily. Class: Normal  
 Pharmacy: [address] [place] [address] Ph #: [phone] Route: Oral  
 aspirin-dipyridamole (AGGRENOX)  mg per SR capsule 0 Sig: Take 1 Cap by mouth two (2) times a day. PATIENT MUST CALL TO MAKE APPOINTMENT FOR FURTHER REFILLS Class: Normal  
 Pharmacy: [address] [place] [address] Ph #: [phone] Route: Oral  
 aspirin-dipyridamole (AGGRENOX)  mg per SR capsule 4 Sig: TAKE ONE CAPSULE BY MOUTH TWICE A DAY Class: Normal  
 Pharmacy: [address] [place] [address] Ph #: [phone]  
 rasagiline (AZILECT) 1 mg tablet 5 Sig: TAKE 1 TABLET BY MOUTH DAILY Class: Normal  
 Pharmacy: [address] [place] [address] Ph #: [phone]  
 levETIRAcetam (KEPPRA) 500 mg tablet 5 Si po qam and 2 po qhs  
 Class: Normal  
 Pharmacy: [address] [place] [address] Ph #: [phone] We Performed the Following LEVETIRACETAM (KEPPRA) [id number] CPT(R)] Follow-up Instructions Return in about 6 months (around 2018). To-Do List   
 2018 Imaging:  DUPLEX CAROTID BILATERAL AMB NEURO Patient Instructions PRESCRIPTION REFILL POLICY [facility] Statement to Patients 2014 In an effort to ensure the large volume of patient prescription refills is processed in the most efficient and expeditious manner, we are asking our patients to assist us by calling your Pharmacy for all prescription refills, this will include also your  Mail Order Pharmacy.  The pharmacy will contact our office electronically to continue the refill process. Please do not wait until the last minute to call your pharmacy. We need at least 48 hours (2days) to fill prescriptions. We also encourage you to call your pharmacy before going to  your prescription to make sure it is ready. With regard to controlled substance prescription refill requests (narcotic refills) that need to be picked up at our office, we ask your cooperation by providing us with at least 72 hours (3days) notice that you will need a refill. We will not refill narcotic prescription refill requests after 4:00pm on any weekday, Monday through Thursday, or after 2:00pm on Fridays, or on the weekends. We encourage everyone to explore another way of getting your prescription refill request processed using [employer], our patient web portal through our electronic medical record system. [employer] is an efficient and effective way to communicate your medication request directly to the office and  downloadable as an isaias on your smart phone . [employer] also features a review functionality that allows you to view your medication list as well as leave messages for your physician. Are you ready to get connected? If so please review the attatched instructions or speak to any of our staff to get you set up right away! Thank you so much for your cooperation. Should you have any questions please contact our Practice Administrator. The Physicians and Staff,  [facility] A Healthy Lifestyle: Care Instructions Your Care Instructions A healthy lifestyle can help you feel good, stay at a healthy weight, and have plenty of energy for both work and play. A healthy lifestyle is something you can share with your whole family. A healthy lifestyle also can lower your risk for serious health problems, such as high blood pressure, heart disease, and diabetes. You can follow a few steps listed below to improve your health and the health of your family. Follow-up care is a key part of your treatment and safety. Be sure to make and go to all appointments, and call your doctor if you are having problems. It's also a good idea to know your test results and keep a list of the medicines you take. How can you care for yourself at home? · Do not eat too much sugar, fat, or fast foods. You can still have dessert and treats now and then. The goal is moderation. · Start small to improve your eating habits. Pay attention to portion sizes, drink less juice and soda pop, and eat more fruits and vegetables. ¨ Eat a healthy amount of food. A 3-ounce serving of meat, for example, is about the size of a deck of cards. Fill the rest of your plate with vegetables and whole grains. ¨ Limit the amount of soda and sports drinks you have every day. Drink more water when you are thirsty. ¨ Eat at least 5 servings of fruits and vegetables every day. It may seem like a lot, but it is not hard to reach this goal. A serving or helping is 1 piece of fruit, 1 cup of vegetables, or 2 cups of leafy, raw vegetables. Have an apple or some carrot sticks as an afternoon snack instead of a candy bar. Try to have fruits and/or vegetables at every meal. 
· Make exercise part of your daily routine. You may want to start with simple activities, such as walking, bicycling, or slow swimming. Try to be active 30 to 60 minutes every day. You do not need to do all 30 to 60 minutes all at once. For example, you can exercise 3 times a day for 10 or 20 minutes. Moderate exercise is safe for most people, but it is always a good idea to talk to your doctor before starting an exercise program. 
· Keep moving. Yaryn  the lawn, work in the garden, or leemail. Take the stairs instead of the elevator at work. · If you smoke, quit.  People who smoke have an increased risk for heart attack, stroke, cancer, and other lung illnesses. Quitting is hard, but there are ways to boost your chance of quitting tobacco for good. ¨ Use nicotine gum, patches, or lozenges. ¨ Ask your doctor about stop-smoking programs and medicines. ¨ Keep trying. In addition to reducing your risk of diseases in the future, you will notice some benefits soon after you stop using tobacco. If you have shortness of breath or asthma symptoms, they will likely get better within a few weeks after you quit. · Limit how much alcohol you drink. Moderate amounts of alcohol (up to 2 drinks a day for men, 1 drink a day for women) are okay. But drinking too much can lead to liver problems, high blood pressure, and other health problems. Family health If you have a family, there are many things you can do together to improve your health. · Eat meals together as a family as often as possible. · Eat healthy foods. This includes fruits, vegetables, lean meats and dairy, and whole grains. · Include your family in your fitness plan. Most people think of activities such as jogging or tennis as the way to fitness, but there are many ways you and your family can be more active. Anything that makes you breathe hard and gets your heart pumping is exercise. Here are some tips: 
¨ Walk to do errands or to take your child to school or the bus. ¨ Go for a family bike ride after dinner instead of watching TV. Where can you learn more? Go to http://georgia-grant.info/. Enter B130 in the search box to learn more about \"A Healthy Lifestyle: Care Instructions. \" Current as of: May 12, 2017 Content Version: 11.4 © 0962-5575 [employer]. Care instructions adapted under license by [employer] (which disclaims liability or warranty for this information).  If you have questions about a medical condition or this instruction, always ask your healthcare professional. [employer] disclaims any warranty or liability for your use of this information. Introducing [facility]! [employer] introduces [employer] patient portal. Now you can access parts of your medical record, email your doctor's office, and request medication refills online. 1. In your internet browser, go to [email or web address] 2. Click on the First Time User? Click Here link in the Sign In box. You will see the New Member Sign Up page. 3. Enter your [employer] Access Code exactly as it appears below. You will not need to use this code after youve completed the sign-up process. If you do not sign up before the expiration date, you must request a new code. · [employer] Access Code: [email or web address] Expires: 6/4/2018 11:03 AM 
 
4. Enter the last four digits of your Social Security Number (xxxx) and Date of Birth (mm/dd/yyyy) as indicated and click Submit. You will be taken to the next sign-up page. 5. Create a [employer] ID. This will be your [employer] login ID and cannot be changed, so think of one that is secure and easy to remember. 6. Create a [employer] password. You can change your password at any time. 7. Enter your Password Reset Question and Answer. This can be used at a later time if you forget your password. 8. Enter your e-mail address. You will receive e-mail notification when new information is available in [address] 9. Click Sign Up. You can now view and download portions of your medical record. 10. Click the Download Summary menu link to download a portable copy of your medical information. If you have questions, please visit the Frequently Asked Questions section of the [employer] website. Remember, [employer] is NOT to be used for urgent needs. For medical emergencies, dial 911. Now available from your iPhone and Android! Please provide this summary of care documentation to your next provider. Your primary care clinician is listed as LUCIA Zaldivar. If you have any questions after today's visit, please call [phone].

## 2018-03-22 NOTE — LETTER
3/22/2018 8:32 PM 
 
Patient:  Ricardo Norman Sr. YOB: 1934 Date of Visit: 3/22/2018 Dear No Recipients: Thank you for referring Mr. Kraig Pascual to me for evaluation/treatment. Below are the relevant portions of my assessment and plan of care. Consult Subjective:  
 
Adri Sweeney is a 80 y.o. right-handed Afro-American male seen today for evaluation at the request of Dr. Robert Ng for evaluation of new problem of worsening gait, slowness of movement, more unsteadiness, and possible worsening of Parkinson's disease, orthostatic syncope, orthostatic hypotension, dementia, carotid stenosis and history of stroke, and seizures. Already on Sinemet 25/100 taking 2 pills 3 times a day and Azilect 1 mg once a day with worsening rigidity, slowness of movement, and difficulty moving his feet according to his daughter. Due to his age I am a little worried about adding a dopamine agonist, so we will try Zonegran as recommended by recent article in the literature showing improvement in motor symptoms of patient's with Parkinson's disease on this medication. Family advised the side effects, they will keep him well hydrated. Patient had repeat carotid Doppler study done today that show no significant stenosis and remained stable from those of one year ago. His daughter states he has had no more syncope, and his Parkinson's disease remains more symptomatic but he has had no falls. He has moderate severe dementia, that is also stable and without behavioral problems. He had a history of seizures, and he is on Keppra 500 mg in the morning and 1000 mg at night since September last year and has not had any seizures. I thought this syncopal episode was probably vasovagal not a seizure, and his EEG and carotid Doppler and Keppra level were all okay. Patient's blood pressure was 100/70 without orthostatic changes today.  He's had no side effects on the medication except for sedation. He has trouble walking, and shuffling his feet. He does have some urinary frequency and urgency and some dementia that is a progressive dementia and memory loss. He has a history of Parkinson's disease and recent stroke last summer. Patient was hospitalized June 2013 with a new left basal ganglion infarct and right hemiparesis and speech difficulties. He did receive physical therapy but still has mild speech impediment and aphasia. He should use a cane but he doesn't do it consistently, and cannot use a walker because he cannot coordinate himself despite urging of his daughter. He has had no new focal weakness, sensory loss, visual complaints, fever or trauma or meningismus or recurrent seizures. He has been told not to drive. He is on Aggrenox therapy and has not had any strokelike symptoms, and his carotid Doppler studies done today showed less than 50% in disease. His gait is worsening some and he has very short steps and shuffling gait, resembling normal pressure hydrocephalus, and CT scan and MRI could be compatible with that diagnosis, but I discussed further workup for that with his daughter, and she wants nothing to do with any type of neurosurgery, and feels he is stable and should be left alone. He has had no new focal weakness, sensory or focal neurologic deficits. On complete review of systems and symptoms, he has had no new medical problems complications or illness. 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 Past Medical History:  
Diagnosis Date  Arthritis of ankle 8/16/2017  Cancer ([facility] [address] Prostate.  Cellulitis 8/16/2017  Cerebrovascular disease 8/16/2017  Detrusor dysfunction 8/16/2017  Edema 8/16/2017  Elevated cholesterol 8/16/2017  Fatigue 8/16/2017  Gastrointestinal disorder   
 ulcer  Hypertension  On statin therapy 8/16/2017  Parkinsons disease ([facility] [address] 8/16/2017  Pernicious anemia 8/16/2017  Prostate cancer ([facility] [address] 8/16/2017  Raynaud disease 8/16/2017  Seizure disorder ([facility] [address] 8/16/2017  Tobacco abuse 8/16/2017  Weight loss 8/16/2017 Past Surgical History:  
Procedure Laterality Date [address] UNLISTED Ulcer.  HX PROSTATECTOMY Family History Problem Relation Age of Onset  Dementia Mother  Stroke Father  Heart Disease Sister  Hypertension Sister  Hypertension Brother  Arthritis-osteo Brother  Heart Disease Brother  COPD Brother  Hypertension Child Social History Substance Use Topics  Smoking status: Current Every Day Smoker Packs/day: 0.50  Smokeless tobacco: Never Used  Alcohol use No  
   
Current Outpatient Prescriptions Medication Sig Dispense Refill  zonisamide (ZONEGRAN) 100 mg capsule Take 1 Cap by mouth daily. 90 Cap 11  
 carbidopa-levodopa (SINEMET)  mg per tablet Take 2 Tabs by mouth three (3) times daily. 540 Tab 4  
 aspirin-dipyridamole (AGGRENOX)  mg per SR capsule TAKE ONE CAPSULE BY MOUTH TWICE A  Cap 4  
 rasagiline (AZILECT) 1 mg tablet TAKE 1 TABLET BY MOUTH DAILY 90 Tab 5  levETIRAcetam (KEPPRA) 500 mg tablet 1 po qam and 2 po qhs 270 Tab 5  
 amLODIPine (NORVASC) 5 mg tablet TAKE 1 TABLET BY MOUTH EVERY DAY 30 Tab 6  pravastatin (PRAVACHOL) 40 mg tablet TAKE 1 TABLET BY MOUTH DAILY 90 Tab 3  
 tamsulosin (FLOMAX) 0.4 mg capsule TAKE ONE CAPSULE BY MOUTH DAILY 30 Cap 6  mirabegron ER (MYRBETRIQ) 25 mg ER tablet Take 1 Tab by mouth daily. 90 Tab 3  
 enalapril (VASOTEC) 5 mg tablet Take 1 Tab by mouth daily. 90 Tab 3  
 hydroCHLOROthiazide (HYDRODIURIL) 25 mg tablet Take 25 mg by mouth daily.  OXYBUTYNIN CHLORIDE PO Take  by mouth.  gentamicin (GARAMYCIN) 0.3 % (3 mg/gram) ophthalmic ointment Administer 0.5 Inches to both eyes three (3) times daily.  MIRABEGRON (MYRBETRIQ PO) Take  by mouth.  methylPREDNISolone (MEDROL) 4 mg tab Take  by mouth daily (with breakfast).  diclofenac (VOLTAREN XR) 100 mg ER tablet Take 100 mg by mouth daily.  Cholecalciferol, Vitamin D3, (VITAMIN D3) 1,000 unit cap Take  by mouth.  multivitamin with iron (DAILY MULTI-VITAMINS/IRON) tablet Take 1 Tab by mouth daily.  pravastatin (PRAVACHOL) 80 mg tablet Take 1 Tab by mouth nightly. 30 Tab 6  
 folic acid-vit E4-NBO E78 (FOLTX) 2.5-25-2 mg tablet Take 1 Tab by mouth daily. 30 Tab 6  
 silodosin (RAPAFLO) 8 mg capsule Take 8 mg by mouth daily (with breakfast).  amLODIPine (NORVASC) 10 mg tablet Take 10 mg by mouth daily. Allergies Allergen Reactions  Pcn [Penicillins] Swelling Review of Systems: A comprehensive review of systems was negative except for: Constitutional: positive for fatigue and malaise Eyes: positive for visual disturbance Musculoskeletal: positive for myalgias, arthralgias and stiff joints Neurological: positive for memory problems, speech problems, coordination problems, gait problems, tremor and weakness Behvioral/Psych: positive for dementia, anxiety, behavior problems and depression Vitals:  
 03/22/18 1247 BP: 124/62 Pulse: 60 SpO2: 98% Height: 5' 7\" (1.702 m) Objective: I 
 
 
NEUROLOGICAL EXAM: 
 
Appearance: The patient is thinly developed and nourished, provides a poor history and is in no acute distress. Mental Status: Oriented to place and person he had difficulty with the time and the president. Patient cannot do serial sevens or remember 2 of 3 things at 30 seconds with distraction. Speech is a little hesitant, with a moderate expressive aphasia. Mood and affect appropriate, but mildly cantankerous. Patient is mildly aphasic and dysarthric Cranial Nerves:   Intact visual fields. Fundi are poorly seen and patient is blind in the left eye. Pupils react on the right and the left is minimally reactive, EOM's full, no nystagmus, no ptosis. Facial sensation is normal. Corneal reflexes are not tested. Facial movement is symmetric. Hearing is abnormal bilaterally. Palate is midline with normal sternocleidomastoid and trapezius muscles are normal. Tongue is midline. Neck without meningismus or bruits Temporal arteries are not tender or enlarged Motor:  4/5 strength in upper and lower proximal and distal muscles, with mild favoring of his right side and decreased rapid alternating movement on the right side. Normal bulk and increased tone with mild cogwheel rigidity and bradykinesia in all extremities. No fasciculations. Reflexes:   Deep tendon reflexes 1+/4 and symmetrical. 
No babinski or clonus present Sensory:   Normal to touch, pinprick and DSS, and vibration decreased in both feet. Gait:  Abnormal gait, the patient having a very shuffling magnetic type gait. Tremor:   No tremor noted. Cerebellar:  Abnormal Romberg and tandem cerebellar signs present. Neurovascular:  Normal heart sounds and regular rhythm, peripheral pulses decreased in both feet, and no carotid bruits. Assessment:  
 
Plan:  
 
Patient now with worsening parkinsonian gait, so we will add Zonegran 100 mg to his regime in view of recent publication showing it helps motor symptoms and Parkinson's disease. He is to continue his Sinemet 2 pills 3 times a day and his Azilect also. His family does not want him to have physical therapy yet. 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 Patient to push p.o. Fluids and drink at least 4 glasses of water a day EEG, carotid Dopplers and Keppra levels were okay Patient is to continue Azilect 1 mg q.a.m. And Sinemet 25/100 taking 2 pills 3 times a day His daughter does not want any neurosurgical intervention for possible normal pressure hydrocephalus Daughter doesn't want any medication for memory loss Daughter doesn't think physical therapy will help, because he does not persist with the exercises Patient will call if any problems  In the interim Patient's condition discussed with patient's daughter and patient in detail Patient advised to remain mentally and physically active, exercising regularly, and take a multivitamin and vitamin C on a regular basis They will followup in 6 months, earlier if needed. Signed By: Ez Platt MD   
 March 22, 2018 This note will not be viewable in [address] If you have questions, please do not hesitate to call me. I look forward to following Mr. Tello along with you. Sincerely, Ez Platt MD

## 2018-03-22 NOTE — PATIENT INSTRUCTIONS
[facility]   Statement to Patients  April 1, 2014      In an effort to ensure the large volume of patient prescription refills is processed in the most efficient and expeditious manner, we are asking our patients to assist us by calling your Pharmacy for all prescription refills, this will include also your  Mail Order Pharmacy. The pharmacy will contact our office electronically to continue the refill process. Please do not wait until the last minute to call your pharmacy. We need at least 48 hours (2days) to fill prescriptions. We also encourage you to call your pharmacy before going to  your prescription to make sure it is ready. With regard to controlled substance prescription refill requests (narcotic refills) that need to be picked up at our office, we ask your cooperation by providing us with at least 72 hours (3days) notice that you will need a refill. We will not refill narcotic prescription refill requests after 4:00pm on any weekday, Monday through Thursday, or after 2:00pm on Fridays, or on the weekends. We encourage everyone to explore another way of getting your prescription refill request processed using [employer], our patient web portal through our electronic medical record system. [employer] is an efficient and effective way to communicate your medication request directly to the office and  downloadable as an isaias on your smart phone . [employer] also features a review functionality that allows you to view your medication list as well as leave messages for your physician. Are you ready to get connected? If so please review the attatched instructions or speak to any of our staff to get you set up right away! Thank you so much for your cooperation. Should you have any questions please contact our Practice Administrator.     The Physicians and Staff,  [facility]          A Healthy Lifestyle: Care Instructions  Your Care Instructions    A healthy lifestyle can help you feel good, stay at a healthy weight, and have plenty of energy for both work and play. A healthy lifestyle is something you can share with your whole family. A healthy lifestyle also can lower your risk for serious health problems, such as high blood pressure, heart disease, and diabetes. You can follow a few steps listed below to improve your health and the health of your family. Follow-up care is a key part of your treatment and safety. Be sure to make and go to all appointments, and call your doctor if you are having problems. It's also a good idea to know your test results and keep a list of the medicines you take. How can you care for yourself at home? · Do not eat too much sugar, fat, or fast foods. You can still have dessert and treats now and then. The goal is moderation. · Start small to improve your eating habits. Pay attention to portion sizes, drink less juice and soda pop, and eat more fruits and vegetables. ¨ Eat a healthy amount of food. A 3-ounce serving of meat, for example, is about the size of a deck of cards. Fill the rest of your plate with vegetables and whole grains. ¨ Limit the amount of soda and sports drinks you have every day. Drink more water when you are thirsty. ¨ Eat at least 5 servings of fruits and vegetables every day. It may seem like a lot, but it is not hard to reach this goal. A serving or helping is 1 piece of fruit, 1 cup of vegetables, or 2 cups of leafy, raw vegetables. Have an apple or some carrot sticks as an afternoon snack instead of a candy bar. Try to have fruits and/or vegetables at every meal.  · Make exercise part of your daily routine. You may want to start with simple activities, such as walking, bicycling, or slow swimming. Try to be active 30 to 60 minutes every day. You do not need to do all 30 to 60 minutes all at once. For example, you can exercise 3 times a day for 10 or 20 minutes.  Moderate exercise is safe for most people, but it is always a good idea to talk to your doctor before starting an exercise program.  · Keep moving. Stacey Alaina the lawn, work in the garden, or [place]. Take the stairs instead of the elevator at work. · If you smoke, quit. People who smoke have an increased risk for heart attack, stroke, cancer, and other lung illnesses. Quitting is hard, but there are ways to boost your chance of quitting tobacco for good. ¨ Use nicotine gum, patches, or lozenges. ¨ Ask your doctor about stop-smoking programs and medicines. ¨ Keep trying. In addition to reducing your risk of diseases in the future, you will notice some benefits soon after you stop using tobacco. If you have shortness of breath or asthma symptoms, they will likely get better within a few weeks after you quit. · Limit how much alcohol you drink. Moderate amounts of alcohol (up to 2 drinks a day for men, 1 drink a day for women) are okay. But drinking too much can lead to liver problems, high blood pressure, and other health problems. Family health  If you have a family, there are many things you can do together to improve your health. · Eat meals together as a family as often as possible. · Eat healthy foods. This includes fruits, vegetables, lean meats and dairy, and whole grains. · Include your family in your fitness plan. Most people think of activities such as jogging or tennis as the way to fitness, but there are many ways you and your family can be more active. Anything that makes you breathe hard and gets your heart pumping is exercise. Here are some tips:  ¨ Walk to do errands or to take your child to school or the bus. ¨ Go for a family bike ride after dinner instead of watching TV. Where can you learn more? Go to http://ge[place]-grant.info/. Enter B630 in the search box to learn more about \"A Healthy Lifestyle: Care Instructions. \"  Current as of:  May 12, 2017  Content Version: 11.4  © 6002-6351 [employer]. Care instructions adapted under license by [employer] (which disclaims liability or warranty for this information). If you have questions about a medical condition or this instruction, always ask your healthcare professional. [place] [address] any warranty or liability for your use of this information.

## 2018-03-23 NOTE — PROGRESS NOTES
Consult    Subjective:     Geremias Rubio Sr. is a 80 y.o. right-handed Afro-American male seen today for evaluation at the request of Dr. Danis Ga for evaluation of new problem of worsening gait, slowness of movement, more unsteadiness, and possible worsening of Parkinson's disease, orthostatic syncope, orthostatic hypotension, dementia, carotid stenosis and history of stroke, and seizures. Already on Sinemet 25/100 taking 2 pills 3 times a day and Azilect 1 mg once a day with worsening rigidity, slowness of movement, and difficulty moving his feet according to his daughter. Due to his age I am a little worried about adding a dopamine agonist, so we will try Zonegran as recommended by recent article in the literature showing improvement in motor symptoms of patient's with Parkinson's disease on this medication. Family advised the side effects, they will keep him well hydrated. Patient had repeat carotid Doppler study done today that show no significant stenosis and remained stable from those of one year ago. His daughter states he has had no more syncope, and his Parkinson's disease remains more symptomatic but he has had no falls. He has moderate severe dementia, that is also stable and without behavioral problems. He had a history of seizures, and he is on Keppra 500 mg in the morning and 1000 mg at night since September last year and has not had any seizures. I thought this syncopal episode was probably vasovagal not a seizure, and his EEG and carotid Doppler and Keppra level were all okay. Patient's blood pressure was 100/70 without orthostatic changes today. He's had no side effects on the medication except for sedation. He has trouble walking, and shuffling his feet. He does have some urinary frequency and urgency and some dementia that is a progressive dementia and memory loss. He has a history of Parkinson's disease and recent stroke last summer.  Patient was hospitalized June 2013 with a new left basal ganglion infarct and right hemiparesis and speech difficulties. He did receive physical therapy but still has mild speech impediment and aphasia. He should use a cane but he doesn't do it consistently, and cannot use a walker because he cannot coordinate himself despite urging of his daughter. He has had no new focal weakness, sensory loss, visual complaints, fever or trauma or meningismus or recurrent seizures. He has been told not to drive. He is on Aggrenox therapy and has not had any strokelike symptoms, and his carotid Doppler studies done today showed less than 50% in disease. His gait is worsening some and he has very short steps and shuffling gait, resembling normal pressure hydrocephalus, and CT scan and MRI could be compatible with that diagnosis, but I discussed further workup for that with his daughter, and she wants nothing to do with any type of neurosurgery, and feels he is stable and should be left alone. He has had no new focal weakness, sensory or focal neurologic deficits. On complete review of systems and symptoms, he has had no new medical problems complications or illness. 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 Past Medical History:   Diagnosis Date    Arthritis of ankle 8/16/2017    Cancer [facility])     Prostate.    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    Edema 8/16/2017    Elevated cholesterol 8/16/2017    Fatigue 8/16/2017    Gastrointestinal disorder     ulcer    Hypertension     On statin therapy 8/16/2017    Parkinsons disease ([facility] [address] 8/16/2017    Pernicious anemia 8/16/2017    Prostate cancer ([facility] [address] 8/16/2017    Raynaud disease 8/16/2017    Seizure disorder ([facility] [address] 8/16/2017    Tobacco abuse 8/16/2017    Weight loss 8/16/2017      Past Surgical History:   Procedure Laterality Date    ABDOMEN SURGERY PROC UNLISTED      Ulcer.  HX PROSTATECTOMY       Family History   Problem Relation Age of Onset    Dementia Mother     Stroke Father     Heart Disease Sister     Hypertension Sister     Hypertension Brother     Arthritis-osteo Brother     Heart Disease Brother     COPD Brother     Hypertension Child       Social History   Substance Use Topics    Smoking status: Current Every Day Smoker     Packs/day: 0.50    Smokeless tobacco: Never Used    Alcohol use No       Current Outpatient Prescriptions   Medication Sig Dispense Refill    zonisamide (ZONEGRAN) 100 mg capsule Take 1 Cap by mouth daily. 90 Cap 11    carbidopa-levodopa (SINEMET)  mg per tablet Take 2 Tabs by mouth three (3) times daily. 540 Tab 4    aspirin-dipyridamole (AGGRENOX)  mg per SR capsule TAKE ONE CAPSULE BY MOUTH TWICE A  Cap 4    rasagiline (AZILECT) 1 mg tablet TAKE 1 TABLET BY MOUTH DAILY 90 Tab 5    levETIRAcetam (KEPPRA) 500 mg tablet 1 po qam and 2 po qhs 270 Tab 5    amLODIPine (NORVASC) 5 mg tablet TAKE 1 TABLET BY MOUTH EVERY DAY 30 Tab 6    pravastatin (PRAVACHOL) 40 mg tablet TAKE 1 TABLET BY MOUTH DAILY 90 Tab 3    tamsulosin (FLOMAX) 0.4 mg capsule TAKE ONE CAPSULE BY MOUTH DAILY 30 Cap 6    mirabegron ER (MYRBETRIQ) 25 mg ER tablet Take 1 Tab by mouth daily. 90 Tab 3    enalapril (VASOTEC) 5 mg tablet Take 1 Tab by mouth daily. 90 Tab 3    hydroCHLOROthiazide (HYDRODIURIL) 25 mg tablet Take 25 mg by mouth daily.  OXYBUTYNIN CHLORIDE PO Take  by mouth.  gentamicin (GARAMYCIN) 0.3 % (3 mg/gram) ophthalmic ointment Administer 0.5 Inches to both eyes three (3) times daily.  MIRABEGRON (MYRBETRIQ PO) Take  by mouth.  methylPREDNISolone (MEDROL) 4 mg tab Take  by mouth daily (with breakfast).       diclofenac (VOLTAREN XR) 100 mg ER tablet Take 100 mg by mouth daily.  Cholecalciferol, Vitamin D3, (VITAMIN D3) 1,000 unit cap Take  by mouth.  multivitamin with iron (DAILY MULTI-VITAMINS/IRON) tablet Take 1 Tab by mouth daily.  pravastatin (PRAVACHOL) 80 mg tablet Take 1 Tab by mouth nightly. 30 Tab 6    folic acid-vit M0-TIC Z21 (FOLTX) 2.5-25-2 mg tablet Take 1 Tab by mouth daily. 30 Tab 6    silodosin (RAPAFLO) 8 mg capsule Take 8 mg by mouth daily (with breakfast).  amLODIPine (NORVASC) 10 mg tablet Take 10 mg by mouth daily. Allergies   Allergen Reactions    Pcn [Penicillins] Swelling        Review of Systems:  A comprehensive review of systems was negative except for: Constitutional: positive for fatigue and malaise  Eyes: positive for visual disturbance  Musculoskeletal: positive for myalgias, arthralgias and stiff joints  Neurological: positive for memory problems, speech problems, coordination problems, gait problems, tremor and weakness  Behvioral/Psych: positive for dementia, anxiety, behavior problems and depression   Vitals:    03/22/18 1247   BP: 124/62   Pulse: 60   SpO2: 98%   Height: 5' 7\" (1.702 m)     Objective:     I      NEUROLOGICAL EXAM:    Appearance: The patient is thinly developed and nourished, provides a poor history and is in no acute distress. Mental Status: Oriented to place and person he had difficulty with the time and the president. Patient cannot do serial sevens or remember 2 of 3 things at 30 seconds with distraction. Speech is a little hesitant, with a moderate expressive aphasia. Mood and affect appropriate, but mildly cantankerous. Patient is mildly aphasic and dysarthric   Cranial Nerves:   Intact visual fields. Fundi are poorly seen and patient is blind in the left eye. Pupils react on the right and the left is minimally reactive, EOM's full, no nystagmus, no ptosis. Facial sensation is normal. Corneal reflexes are not tested. Facial movement is symmetric.  Hearing is abnormal bilaterally. Palate is midline with normal sternocleidomastoid and trapezius muscles are normal. Tongue is midline. Neck without meningismus or bruits  Temporal arteries are not tender or enlarged   Motor:  4/5 strength in upper and lower proximal and distal muscles, with mild favoring of his right side and decreased rapid alternating movement on the right side. Normal bulk and increased tone with mild cogwheel rigidity and bradykinesia in all extremities. No fasciculations. Reflexes:   Deep tendon reflexes 1+/4 and symmetrical.  No babinski or clonus present   Sensory:   Normal to touch, pinprick and DSS, and vibration decreased in both feet. Gait:  Abnormal gait, the patient having a very shuffling magnetic type gait. Tremor:   No tremor noted. Cerebellar:  Abnormal Romberg and tandem cerebellar signs present. Neurovascular:  Normal heart sounds and regular rhythm, peripheral pulses decreased in both feet, and no carotid bruits. Assessment:     Plan:     Patient now with worsening parkinsonian gait, so we will add Zonegran 100 mg to his regime in view of recent publication showing it helps motor symptoms and Parkinson's disease. He is to continue his Sinemet 2 pills 3 times a day and his Azilect also. His family does not want him to have physical therapy yet.   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 Patient to push p.o. Fluids and drink at least 4 glasses of water a day  EEG, carotid Dopplers and Keppra levels were okay  Patient is to continue Azilect 1 mg q.a.m. And Sinemet 25/100 taking 2 pills 3 times a day  His daughter does not want any neurosurgical intervention for possible normal pressure hydrocephalus  Daughter doesn't want any medication for memory loss  Daughter doesn't think physical therapy will help, because he does not persist with the exercises  Patient will call if any problems  In the interim  Patient's condition discussed with patient's daughter and patient in detail  Patient advised to remain mentally and physically active, exercising regularly, and take a multivitamin and vitamin C on a regular basis  They will followup in 6 months, earlier if needed. Signed By: Coleen Angulo MD     March 22, 2018       This note will not be viewable in [address]

## 2018-03-23 NOTE — PROCEDURES
This study consisted of pulsed wave Doppler examination, Color-flow imaging, and Duplex imaging of both the right and left carotid systems, and both vertebral arteries.        Imaging of both right and left carotid systems showed mild mixed plaquing at the bifurcations and proximal and distal internal and external carotid arteries bilaterally, with stenosis in the range of 16-49% only and with no flow abnormalities identified.        Both vertebral arteries showed normal antegrade flow.         Clinical correlation recommended

## 2018-03-25 LAB — LEVETIRACETAM SERPL-MCNC: 26 UG/ML (ref 10–40)

## 2018-08-17 RX ORDER — TAMSULOSIN HYDROCHLORIDE 0.4 MG/1
CAPSULE ORAL
Qty: 30 CAP | Refills: 6 | Status: SHIPPED | OUTPATIENT
Start: 2018-08-17 | End: 2019-03-08 | Stop reason: SDUPTHER

## 2018-09-10 RX ORDER — ENALAPRIL MALEATE 5 MG/1
TABLET ORAL
Qty: 90 TAB | Refills: 3 | Status: SHIPPED | OUTPATIENT
Start: 2018-09-10 | End: 2018-12-04 | Stop reason: ALTCHOICE

## 2018-09-21 ENCOUNTER — OFFICE VISIT (OUTPATIENT)
Dept: INTERNAL MEDICINE CLINIC | Age: 83
End: 2018-09-21

## 2018-09-21 VITALS
HEART RATE: 81 BPM | SYSTOLIC BLOOD PRESSURE: 149 MMHG | BODY MASS INDEX: 20.09 KG/M2 | DIASTOLIC BLOOD PRESSURE: 64 MMHG | HEIGHT: 67 IN | OXYGEN SATURATION: 99 % | WEIGHT: 128 LBS

## 2018-09-21 DIAGNOSIS — Z23 ENCOUNTER FOR IMMUNIZATION: ICD-10-CM

## 2018-09-21 DIAGNOSIS — G40.209 COMPLEX PARTIAL SEIZURE EVOLVING TO GENERALIZED SEIZURE (HCC): ICD-10-CM

## 2018-09-21 DIAGNOSIS — Z79.899 ON STATIN THERAPY: ICD-10-CM

## 2018-09-21 DIAGNOSIS — Z13.1 SCREENING FOR DIABETES MELLITUS: ICD-10-CM

## 2018-09-21 DIAGNOSIS — Z13.39 SCREENING FOR ALCOHOLISM: ICD-10-CM

## 2018-09-21 DIAGNOSIS — G20 PARKINSONS DISEASE (HCC): ICD-10-CM

## 2018-09-21 DIAGNOSIS — F02.80 DEMENTIA OF THE ALZHEIMER'S TYPE WITH LATE ONSET WITHOUT BEHAVIORAL DISTURBANCE (HCC): ICD-10-CM

## 2018-09-21 DIAGNOSIS — Z13.6 SCREENING FOR ISCHEMIC HEART DISEASE: ICD-10-CM

## 2018-09-21 DIAGNOSIS — I10 ESSENTIAL HYPERTENSION: Chronic | ICD-10-CM

## 2018-09-21 DIAGNOSIS — I63.312 THROMBOTIC STROKE INVOLVING LEFT MIDDLE CEREBRAL ARTERY (HCC): ICD-10-CM

## 2018-09-21 DIAGNOSIS — E78.00 ELEVATED CHOLESTEROL: ICD-10-CM

## 2018-09-21 DIAGNOSIS — C61 PROSTATE CANCER (HCC): ICD-10-CM

## 2018-09-21 DIAGNOSIS — Z00.00 MEDICARE ANNUAL WELLNESS VISIT, SUBSEQUENT: Primary | ICD-10-CM

## 2018-09-21 DIAGNOSIS — E53.8 B12 DEFICIENCY: ICD-10-CM

## 2018-09-21 DIAGNOSIS — Z13.31 SCREENING FOR DEPRESSION: ICD-10-CM

## 2018-09-21 DIAGNOSIS — G30.1 DEMENTIA OF THE ALZHEIMER'S TYPE WITH LATE ONSET WITHOUT BEHAVIORAL DISTURBANCE (HCC): ICD-10-CM

## 2018-09-21 NOTE — LETTER
10/29/2018 7:59 AM 
 
Mr. [address] [id number] Dear Kirsty Thorpe.: 
 
Please find your most recent results below. Resulted Orders CK Result Value Ref Range Creatine Kinase,Total 71 24 - 204 U/L Narrative Performed at:  [address]  [id number] : Darletta Dancer MD, Phone:  [phone] LIPID PANEL Result Value Ref Range Cholesterol, total 141 100 - 199 mg/dL Triglyceride 57 0 - 149 mg/dL HDL Cholesterol 76 >39 mg/dL VLDL, calculated 11 5 - 40 mg/dL LDL, calculated 54 0 - 99 mg/dL Narrative Performed at:  [address]  [id number] : Darletta Dancer MD, Phone:  [phone] METABOLIC PANEL, COMPREHENSIVE Result Value Ref Range Glucose 75 65 - 99 mg/dL BUN 25 8 - 27 mg/dL Creatinine 1.45 (H) 0.76 - 1.27 mg/dL GFR est non-AA 44 (L) >59 mL/min/1.73 GFR est AA 51 (L) >59 mL/min/1.73  
 BUN/Creatinine ratio 17 10 - 24 Sodium 146 (H) 134 - 144 mmol/L Potassium 5.4 (H) 3.5 - 5.2 mmol/L Chloride 107 (H) 96 - 106 mmol/L  
 CO2 25 20 - 29 mmol/L Calcium 10.2 8.6 - 10.2 mg/dL Protein, total 7.3 6.0 - 8.5 g/dL Albumin 4.4 3.5 - 4.7 g/dL GLOBULIN, TOTAL 2.9 1.5 - 4.5 g/dL A-G Ratio 1.5 1.2 - 2.2 Bilirubin, total 0.3 0.0 - 1.2 mg/dL Alk. phosphatase 81 39 - 117 IU/L  
 AST (SGOT) 31 0 - 40 IU/L  
 ALT (SGPT) 32 0 - 44 IU/L Narrative Performed at:  [address]  [id number] : Darletta Dancer MD, Phone:  [phone] VITAMIN B12 Result Value Ref Range Vitamin B12 964 232 - 1,245 pg/mL Narrative Performed at:  [address]  [id number] : Darletta Dancer MD, Phone:  [phone] RECOMMENDATIONS: 
 Lab work overall looks fine. However kidney function has worsened somewhat and potassium level is elevated. Discontinue enalapril and monitor on follow-up appointment. Please call me if you have any questions: [phone] Sincerely, Julieta Gonzalez MD

## 2018-09-21 NOTE — MR AVS SNAPSHOT
[address] [facility] [address] [address] [phone] Patient: Emmanuel Viveros. MRN: [id number] HGS:1[birth year]14 Visit Information Date & Time Provider Department Dept. Phone Encounter #  
 9/21/2018  8:50 AM LUCIA Quintana MD [facility] [address] [facility] [phone] [id number] Your Appointments 11/19/2018  1:20 PM  
Follow Up with Sonia Tavarez MD  
Neurology Clinic at [facility] [address] [facility]) Appt Note: f/u PD, jrb 3/22/18  
 [address] [facility], 
[address] [place] [address], [address] [phone]  
[address] [facility], [address] [place] [address], [address] [address] Upcoming Health Maintenance Date Due DTaP/Tdap/Td series (1 - Tdap) 5/3/1955 ZOSTER VACCINE AGE 60> 3/3/1994 GLAUCOMA SCREENING Q2Y 5/3/1999 MEDICARE YEARLY EXAM 3/14/2018 Influenza Age 5 to Adult 8/1/2018 Allergies as of 9/21/2018  Review Complete On: 9/21/2018 By: Ellyn Durham MD  
  
 Severity Noted Reaction Type Reactions Pcn [Penicillins]  06/01/2012    Swelling Current Immunizations  Reviewed on 4/18/2014 Name Date Influenza High Dose Vaccine PF 9/6/2017 Influenza Vaccine 12/2/2015, 12/6/2012 Influenza Vaccine (Tri) Adjuvanted 9/21/2018 Pneumococcal Conjugate (PCV-13) 12/2/2015 Pneumococcal Polysaccharide (PPSV-23) 9/6/2017 Pneumococcal Vaccine (Unspecified Type) 1/1/2013, 12/6/2012 ZZZ-RETIRED (DO NOT USE) Pneumococcal Vaccine (Unspecified Type)  Deferred (Patient Refused) Not reviewed this visit You Were Diagnosed With   
  
 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 
ICD-9-CM: 332.0 Prostate cancer [facility])     ICD-10-CM: Q40 ICD-9-CM: 288 Essential hypertension     ICD-10-CM: I10 
ICD-9-CM: 401.9 Dementia of the Alzheimer's type with late onset without behavioral disturbance     ICD-10-CM: G30.1, F02.80 ICD-9-CM: 331.0, 294.10 On statin therapy     ICD-10-CM: Z79.899 ICD-9-CM: V58.69 Elevated cholesterol     ICD-10-CM: E78.00 ICD-9-CM: 272.0 B12 deficiency     ICD-10-CM: E53.8 ICD-9-CM: 266.2 Vitals BP Pulse Height(growth percentile) Weight(growth percentile) SpO2 BMI  
 149/64 (BP 1 Location: Left arm, BP Patient Position: Sitting) 81 5' 7\" (1.702 m) 128 lb (58.1 kg) 99% 20.05 kg/m2 Smoking Status Current Every Day Smoker Vitals History BMI and BSA Data Body Mass Index Body Surface Area 20.05 kg/m 2 1.66 m 2 Preferred Pharmacy Pharmacy Name Phone [id number] Prabha Gowers, [address] [place] [address] [phone] Your Updated Medication List  
  
   
This list is accurate as of 9/21/18 10:26 AM.  Always use your most recent med list. amLODIPine 10 mg tablet Commonly known as:  [place] Take 10 mg by mouth daily. aspirin-dipyridamole  mg per SR capsule Commonly known as:  AGGRENOX  
TAKE ONE CAPSULE BY MOUTH TWICE A DAY  
  
 carbidopa-levodopa  mg per tablet Commonly known as:  SINEMET Take 2 Tabs by mouth three (3) times daily. enalapril 5 mg tablet Commonly known as:  VASOTEC  
TAKE 1 TAB BY MOUTH DAILY. levETIRAcetam 500 mg tablet Commonly known as:  KEPPRA 1 po qam and 2 po qhs  
  
 mirabegron ER 25 mg ER tablet Commonly known as:  MYRBETRIQ Take 1 Tab by mouth daily. pravastatin 40 mg tablet Commonly known as:  PRAVACHOL  
TAKE 1 TABLET BY MOUTH DAILY  
  
 rasagiline 1 mg tablet Commonly known as:  AZILECT  
TAKE 1 TABLET BY MOUTH DAILY tamsulosin 0.4 mg capsule Commonly known as:  FLOMAX TAKE ONE CAPSULE BY MOUTH DAILY We Performed the Following CK [id number] CPT(R)] INFLUENZA VACCINE INACTIVATED (IIV), SUBUNIT, ADJUVANTED, IM [id number] CPT(R)] LIPID PANEL [25510 CPT(R)] METABOLIC PANEL, COMPREHENSIVE [80055 CPT(R)] VITAMIN B12 [id number] CPT(R)] Introducing \[facility] [place] [facility]\"" & HEALTH SERVICES! [facility] introduces [employer] patient portal. Now you can access parts of your medical record, email your doctor's office, and request medication refills online. 1. In your internet browser, go to [email or web address] 2. Click on the First Time User? Click Here link in the Sign In box. You will see the New Member Sign Up page. 3. Enter your [employer] Access Code exactly as it appears below. You will not need to use this code after youve completed the sign-up process. If you do not sign up before the expiration date, you must request a new code. · [employer]Eyes Access Code: [email or web address] Expires: 12/20/2018  9:00 AM 
 
4. Enter the last four digits of your Social Security Number (xxxx) and Date of Birth (mm/dd/yyyy) as indicated and click Submit. You will be taken to the next sign-up page. 5. Create a Sy[employer]Eyes ID. This will be your [employer] login ID and cannot be changed, so think of one that is secure and easy to remember. 6. Create a [employer] password. You can change your password at any time. 7. Enter your Password Reset Question and Answer. This can be used at a later time if you forget your password. 8. Enter your e-mail address. You will receive e-mail notification when new information is available in [address] 9. Click Sign Up. You can now view and download portions of your medical record. 10. Click the Download Summary menu link to download a portable copy of your medical information.  
 
If you have questions, please visit the Frequently Asked Questions section of the [employer]. Remember, [employer] is NOT to be used for urgent needs. For medical emergencies, dial 911. Now available from your iPhone and Android! Please provide this summary of care documentation to your next provider. Your primary care clinician is listed as LUCIA Cardoza. If you have any questions after today's visit, please call [phone].

## 2018-09-21 NOTE — PROGRESS NOTES
Veda Ruffin is a 80 y.o. male presenting for Cholesterol Problem (rm 3 - 6 mo - non fasting) and Blood Pressure Check Candance Huger 1. Have you been to the ER, urgent care clinic since your last visit? Hospitalized since your last visit? No 
 
2. Have you seen or consulted any other health care providers outside of the [address] [place] [address] since your last visit? Include any pap smears or colon screening.  No

## 2018-09-21 NOTE — Clinical Note
Lab work overall looks fine. However kidney function has worsened somewhat and potassium level is elevated. Discontinue enalapril and monitor on follow-up appointment.

## 2018-09-22 LAB
ALBUMIN SERPL-MCNC: 4.4 G/DL (ref 3.5–4.7)
ALBUMIN/GLOB SERPL: 1.5 {RATIO} (ref 1.2–2.2)
ALP SERPL-CCNC: 81 IU/L (ref 39–117)
ALT SERPL-CCNC: 32 IU/L (ref 0–44)
AST SERPL-CCNC: 31 IU/L (ref 0–40)
BILIRUB SERPL-MCNC: 0.3 MG/DL (ref 0–1.2)
BUN SERPL-MCNC: 25 MG/DL (ref 8–27)
BUN/CREAT SERPL: 17 (ref 10–24)
CALCIUM SERPL-MCNC: 10.2 MG/DL (ref 8.6–10.2)
CHLORIDE SERPL-SCNC: 107 MMOL/L (ref 96–106)
CHOLEST SERPL-MCNC: 141 MG/DL (ref 100–199)
CK SERPL-CCNC: 71 U/L (ref 24–204)
CO2 SERPL-SCNC: 25 MMOL/L (ref 20–29)
CREAT SERPL-MCNC: 1.45 MG/DL (ref 0.76–1.27)
GLOBULIN SER CALC-MCNC: 2.9 G/DL (ref 1.5–4.5)
GLUCOSE SERPL-MCNC: 75 MG/DL (ref 65–99)
HDLC SERPL-MCNC: 76 MG/DL
LDLC SERPL CALC-MCNC: 54 MG/DL (ref 0–99)
POTASSIUM SERPL-SCNC: 5.4 MMOL/L (ref 3.5–5.2)
PROT SERPL-MCNC: 7.3 G/DL (ref 6–8.5)
SODIUM SERPL-SCNC: 146 MMOL/L (ref 134–144)
TRIGL SERPL-MCNC: 57 MG/DL (ref 0–149)
VIT B12 SERPL-MCNC: 964 PG/ML (ref 232–1245)
VLDLC SERPL CALC-MCNC: 11 MG/DL (ref 5–40)

## 2018-09-23 NOTE — PATIENT INSTRUCTIONS
Medicare Wellness Visit, Male The best way to live healthy is to have a lifestyle where you eat a well-balanced diet, exercise regularly, limit alcohol use, and quit all forms of tobacco/nicotine, if applicable. Regular preventive services are another way to keep healthy. Preventive services (vaccines, screening tests, monitoring & exams) can help personalize your care plan, which helps you manage your own care. Screening tests can find health problems at the earliest stages, when they are easiest to treat. [facility] follows the current, evidence-based guidelines published by the [facility] ([facility]) when recommending preventive services for our patients. Because we follow these guidelines, sometimes recommendations change over time as research supports it. (For example, a prostate screening blood test is no longer routinely recommended for men with no symptoms.) Of course, you and your doctor may decide to screen more often for some diseases, based on your risk and co-morbidities (chronic disease you are already diagnosed with). Preventive services for you include: - Medicare offers their members a free annual wellness visit, which is time for you and your primary care provider to discuss and plan for your preventive service needs. Take advantage of this benefit every year! 
-All adults over age 72 should receive the recommended pneumonia vaccines. Current USPSTF guidelines recommend a series of two vaccines for the best pneumonia protection.  
-All adults should have a flu vaccine yearly and an ECG.  All adults age 61 and older should receive a shingles vaccine once in their lifetime.   
-All adults age 38-68 who are overweight should have a diabetes screening test once every three years.  
-Other screening tests & preventive services for persons with diabetes include: an eye exam to screen for diabetic retinopathy, a kidney function test, a foot exam, and stricter control over your cholesterol.  
-Cardiovascular screening for adults with routine risk involves an electrocardiogram (ECG) at intervals determined by the provider.  
-Colorectal cancer screening should be done for adults age 54-65 with no increased risk factors for colorectal cancer. There are a number of acceptable methods of screening for this type of cancer. Each test has its own benefits and drawbacks. Discuss with your provider what is most appropriate for you during your annual wellness visit. The different tests include: colonoscopy (considered the best screening method), a fecal occult blood test, a fecal DNA test, and sigmoidoscopy. 
-All adults born between [facility] should be screened once for Hepatitis C. 
-An Abdominal Aortic Aneurysm (AAA) Screening is recommended for men age 73-68 who has ever smoked in their lifetime. Here is a list of your current Health Maintenance items (your personalized list of preventive services) with a due date: 
Health Maintenance Due Topic Date Due  
 DTaP/Tdap/Td  (1 - Tdap) 05/03/1955  Shingles Vaccine  03/03/1994  Glaucoma Screening   05/03/1999 [facility] Annual Well Visit  03/14/2018 All Medicare beneficiaries aged 48 and older are covered; however, when a beneficiary is at high risk, there is no minimum age required to receive a screening colonoscopy or a barium enema rendered as an alternative to a screening colonoscopy. The following are the coverage criteria for each colorectal cancer screening test/procedure. Screening FOBT Medicare provides coverage of a screening FOBT annually (i.e., at least 11 months have passed following the month in which the last covered screening FOBT was performed) for beneficiaries aged 48 and older. This screening requires a written order from the beneficiarys attending physician.  
 
NOTE: Medicare will only provide coverage for one FOBT per year: either HCPCS code  or CPT code [id number], but not both. Screening Colonoscopy For Beneficiaries at [facility] for Developing Colorectal Cancer Medicare provides coverage of a screening colonoscopy (HCPCS code ) once every 2 years for beneficiaries at high risk for developing colorectal cancer (i.e., at least 23 months have passed following the month in which the last covered screening colonoscopy [HCPCS code ] was performed). For Beneficiaries Not at [facility] Medicare provides coverage of a screening colonoscopy (HCPCS code ) for beneficiaries who do not meet the criteria for being at high risk for developing colorectal cancer once every 10 years (i.e., at least 119 months have passed following the month in which the last covered screening colonoscopy [HCPCS code ] was performed). If the beneficiary otherwise qualifies to have a covered screening colonoscopy (HCPCS code ) based on the above but has had a covered screening flexible sigmoidoscopy (HCPCS code ), then Medicare may cover a screening colonoscopy (HCPCS code ) only after at least 47 months have passed following the month in which the last covered screening flexible sigmoidoscopy (HCPCS code ) was performed.

## 2018-09-23 NOTE — PROGRESS NOTES
This is the Subsequent Medicare Annual Wellness Exam, performed 12 months or more after the Initial AWV or the last Subsequent AWV I have reviewed the patient's medical history in detail and updated the computerized patient record. History Past Medical History:  
Diagnosis Date  Arthritis of ankle 8/16/2017  Cancer ([facility] Prostate.  Cellulitis 8/16/2017  Cerebrovascular disease 8/16/2017  Detrusor dysfunction 8/16/2017  Edema 8/16/2017  Elevated cholesterol 8/16/2017  Fatigue 8/16/2017  Gastrointestinal disorder   
 ulcer  Hypertension  On statin therapy 8/16/2017  Parkinsons disease ([facility] 8/16/2017  Pernicious anemia 8/16/2017  Prostate cancer ([facility] 8/16/2017  Raynaud disease 8/16/2017  Seizure disorder ([facility] 8/16/2017  Tobacco abuse 8/16/2017  Weight loss 8/16/2017 Past Surgical History:  
Procedure Laterality Date [address] [place] UNLISTED Ulcer.  HX PROSTATECTOMY Current Outpatient Prescriptions Medication Sig Dispense Refill  enalapril (VASOTEC) 5 mg tablet TAKE 1 TAB BY MOUTH DAILY. 90 Tab 3  
 tamsulosin (FLOMAX) 0.4 mg capsule TAKE ONE CAPSULE BY MOUTH DAILY 30 Cap 6  carbidopa-levodopa (SINEMET)  mg per tablet Take 2 Tabs by mouth three (3) times daily. 540 Tab 4  
 aspirin-dipyridamole (AGGRENOX)  mg per SR capsule TAKE ONE CAPSULE BY MOUTH TWICE A  Cap 4  
 rasagiline (AZILECT) 1 mg tablet TAKE 1 TABLET BY MOUTH DAILY 90 Tab 5  levETIRAcetam (KEPPRA) 500 mg tablet 1 po qam and 2 po qhs 270 Tab 5  pravastatin (PRAVACHOL) 40 mg tablet TAKE 1 TABLET BY MOUTH DAILY 90 Tab 3  
 mirabegron ER (MYRBETRIQ) 25 mg ER tablet Take 1 Tab by mouth daily. 90 Tab 3  
 amLODIPine (NORVASC) 10 mg tablet Take 10 mg by mouth daily. Allergies Allergen Reactions  Pcn [Penicillins] Swelling Family History Problem Relation Age of Onset  Dementia Mother  Stroke Father  Heart Disease Sister  Hypertension Sister  Hypertension Brother  Arthritis-osteo Brother  Heart Disease Brother  COPD Brother  Hypertension Child Social History Substance Use Topics  Smoking status: Current Every Day Smoker Packs/day: 0.50  Smokeless tobacco: Never Used  Alcohol use No  
 
Patient Active Problem List  
Diagnosis Code  History of CVA (cerebrovascular accident) Z80.78  
 Prostate hypertrophy N40.0  Parkinson disease ([facility] G20  
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  
 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  
 Cellulitis L03.90  Cerebrovascular disease I67.9  Detrusor dysfunction N31.8  Edema R60.9  Elevated cholesterol E78.00  Fatigue R53.83  
 On statin therapy Z79.899  Parkinsons disease ([address] G20  
 Pernicious anemia D51.0  Prostate cancer ([facility] C61  Raynaud disease I73.00  Seizure disorder ([address] G40.909  Tobacco abuse Z72.0  Weight loss R63.4  
 Essential hypertension I10  Thrombotic stroke involving left middle cerebral artery ([facility] I63.312  
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 Functional Ability and Level of Safety:  
Hearing Loss Hearing is good. Activities of Daily Living The home contains: no safety equipment. Patient needs help with:  phone, transportation, shopping, preparing meals, laundry, managing medications, managing money, dressing, bathing, hygiene, bathroom needs and walking Fall Risk Fall Risk Assessment, last 12 mths 9/21/2018 Able to walk? Yes Fall in past 12 months? Yes  
Number of falls in past 12 months 5 Abuse Screen Patient is not abused Cognitive Screening Evaluation of Cognitive Function: 
Has your family/caregiver stated any concerns about your memory: yes Abnormal cognitive screening with history of dementia. Patient Care Team  
Patient Care Team: 
Jayesh Solis MD as PCP - General (Internal Medicine) Assessment/Plan Education and counseling provided: 
Are appropriate based on today's review and evaluation Diagnoses and all orders for this visit: 
 
1. Medicare annual wellness visit, subsequent 2. Encounter for immunization -     Influenza Vaccine Inactivated (IIV)(FLUAD), Subunit, Adjuvanted, IM, (41224) 3. Thrombotic stroke involving left middle cerebral artery ([facility] 4. 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 Parkinsons disease ([facility] 6. Prostate cancer ([facility] 7. Essential hypertension -     METABOLIC PANEL, COMPREHENSIVE 8. 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 On statin therapy 
-     CK 
-     METABOLIC PANEL, COMPREHENSIVE 10. Elevated cholesterol -     LIPID PANEL 11. B12 deficiency 
-     VITAMIN B12 
 
12. Screening for alcoholism -     Annual  Alcohol Screen 15 min () 13. Screening for depression -     Depression Screen Annual 
 
14. Screening for diabetes mellitus 15. Screening for ischemic heart disease Health Maintenance Due Topic Date Due  
 DTaP/Tdap/Td series (1 - Tdap) 05/03/1955  ZOSTER VACCINE AGE 60>  03/03/1994  GLAUCOMA SCREENING Q2Y  05/03/1999  MEDICARE YEARLY EXAM  03/14/2018 This note will not be viewable in [address] Sj Bonilla Sr. is a 80 y.o. male and presents with Cholesterol Problem (rm 3 - 6 mo - non fasting) and Blood Pressure Check Chris Rasmussen Subjective: 
Mr. Peralta Files presents today for follow-up of multiple problems and annual wellness visit. Problems include Parkinson's disease, hyperlipidemia, hypertension, seizure disorder, history of stroke, history of generalized seizure, and prostate cancer remotely. The patient is accompanied by his daughter today. He uses a rolling walker to ambulate. He does have a shuffling gait. There have been no reported falls. He has some mild dementia related to his underlying Parkinson's. He has some urinary incontinence. Status post previous history of prostate cancer and is maintained on medication for overactive bladder as well. There are no specific complaints related to his current medical regimen. He has follow-up with neurology on a every six-month basis. He remains on Keppra for history of seizure disorder. He denies chest pain, palpitations, PND, orthopnea, or pedal edema. He requires assistance with most ADLs. He lives with his daughter. Past Medical History:  
Diagnosis Date  Arthritis of ankle 8/16/2017  Cancer ([address] Prostate.  Cellulitis 8/16/2017  Cerebrovascular disease 8/16/2017  Detrusor dysfunction 8/16/2017  Edema 8/16/2017  Elevated cholesterol 8/16/2017  Fatigue 8/16/2017  Gastrointestinal disorder   
 ulcer  Hypertension  On statin therapy 8/16/2017  Parkinsons disease ([address] 8/16/2017  Pernicious anemia 8/16/2017  Prostate cancer ([address] 8/16/2017  Raynaud disease 8/16/2017  Seizure disorder ([address] 8/16/2017  Tobacco abuse 8/16/2017  Weight loss 8/16/2017 Past Surgical History:  
Procedure Laterality Date [address] UNLISTED Ulcer.  HX PROSTATECTOMY Allergies Allergen Reactions  Pcn [Penicillins] Swelling Current Outpatient Prescriptions Medication Sig Dispense Refill  enalapril (VASOTEC) 5 mg tablet TAKE 1 TAB BY MOUTH DAILY. 90 Tab 3  
 tamsulosin (FLOMAX) 0.4 mg capsule TAKE ONE CAPSULE BY MOUTH DAILY 30 Cap 6  carbidopa-levodopa (SINEMET)  mg per tablet Take 2 Tabs by mouth three (3) times daily. 540 Tab 4  
 aspirin-dipyridamole (AGGRENOX)  mg per SR capsule TAKE ONE CAPSULE BY MOUTH TWICE A  Cap 4  
 rasagiline (AZILECT) 1 mg tablet TAKE 1 TABLET BY MOUTH DAILY 90 Tab 5  levETIRAcetam (KEPPRA) 500 mg tablet 1 po qam and 2 po qhs 270 Tab 5  pravastatin (PRAVACHOL) 40 mg tablet TAKE 1 TABLET BY MOUTH DAILY 90 Tab 3  
 mirabegron ER (MYRBETRIQ) 25 mg ER tablet Take 1 Tab by mouth daily. 90 Tab 3  
 amLODIPine (NORVASC) 10 mg tablet Take 10 mg by mouth daily. Social History Social History  Marital status: SINGLE Spouse name: N/A  
 Number of children: N/A  
 Years of education: N/A Social History Main Topics  Smoking status: Current Every Day Smoker Packs/day: 0.50  Smokeless tobacco: Never Used  Alcohol use No  
 Drug use: No  
 Sexual activity: Not Currently Other Topics Concern  None Social History Narrative Family History Problem Relation Age of Onset  Dementia Mother  Stroke Father  Heart Disease Sister  Hypertension Sister  Hypertension Brother  Arthritis-osteo Brother  Heart Disease Brother  COPD Brother  Hypertension Child Review of Systems Constitutional:  negative for fevers, chills, anorexia and weight loss Eyes:    negative for visual disturbance and irritation ENT:    negative for tinnitus,sore throat,nasal congestion,ear pains. hoarseness Respiratory:     negative for cough, hemoptysis, dyspnea,wheezing CV:    negative for chest pain, palpitations, lower extremity edema GI:    negative for nausea, vomiting, diarrhea, abdominal pain,melena Endo:               negative for polyuria,polydipsia,polyphagia,heat intolerance Genitourinary : negative for frequency, dysuria and hematuria Integumentary: negative for rash and pruritus Hematologic:   negative for easy bruising and gum/nose bleeding Musculoskel:  negative for myalgias, arthralgias, back pain, muscle weakness, joint pain Neurological:   negative for headaches, dizziness, vertigo, memory problems and gait Behavl/Psych:  negative for feelings of anxiety, depression, mood changes ROS otherwise negative Objective: 
Visit Vitals  /64 (BP 1 Location: Left arm, BP Patient Position: Sitting)  Pulse 81  
 Ht 5' 7\" (1.702 m)  Wt 128 lb (58.1 kg)  SpO2 99%  BMI 20.05 kg/m2 Physical Exam:  
General appearance - alert, frail,  and in no distress Mental status - alert, oriented to person, place EYE-YARY, EOMI, fundi normal, corneas normal, no foreign bodies ENT-ENT exam normal, no neck nodes or sinus tenderness Nose - normal and patent, no erythema, discharge or polyps Mouth - mucous membranes moist, pharynx normal without lesions Neck - supple, no significant adenopathy Chest - clear to auscultation, no wheezes, rales or rhonchi, symmetric air entry Heart - normal rate, regular rhythm, normal S1, S2, no murmurs, rubs, clicks or gallops Abdomen - soft, nontender, nondistended, no masses or organomegaly Lymph- no adenopathy palpable Ext-peripheral pulses normal, no pedal edema, no clubbing or cyanosis Skin-Warm and dry. no hyperpigmentation, vitiligo, or suspicious lesions Neuro -alert, oriented only to person and place, shuffling gait, mild rigidity without resting tremor Assessment/Plan: 
Diagnoses and all orders for this visit: 
 
1. Medicare annual wellness visit, subsequent 2. Encounter for immunization -     Influenza Vaccine Inactivated (IIV)(FLUAD), Subunit, Adjuvanted, IM, (95570) 3. Thrombotic stroke involving left middle cerebral artery ([facility] 4. 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 Parkinsons disease ([facility] 6. [facility] 7. Essential hypertension -     METABOLIC PANEL, COMPREHENSIVE 8. 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 On statin therapy 
-     CK 
-     METABOLIC PANEL, COMPREHENSIVE 10. Elevated cholesterol -     LIPID PANEL 11. B12 deficiency 
-     VITAMIN B12 
 
12. Screening for alcoholism -     Annual  Alcohol Screen 15 min () 13. Screening for depression -     Depression Screen Annual 
 
14. Screening for diabetes mellitus 15. Screening for ischemic heart disease ICD-10-CM ICD-9-CM 1. Medicare annual wellness visit, subsequent Z00.00 V70.0 2. Encounter for immunization Z23 V03.89 INFLUENZA VACCINE INACTIVATED (IIV), SUBUNIT, ADJUVANTED, IM  
3. Thrombotic stroke involving left middle cerebral artery ([facility] I63.312 434.01   
4. 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   
5. Parkinsons disease ([facility] G20 332.0 6. Prostate cancer ([facility] C61 185   
7. Essential hypertension I75 204.5 METABOLIC PANEL, COMPREHENSIVE 8. 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   
9. On statin therapy Z79.899 V58.69 CK METABOLIC PANEL, COMPREHENSIVE 10. Elevated cholesterol E78.00 272.0 LIPID PANEL 11. B12 deficiency E53.8 266.2 VITAMIN B12  
12. Screening for alcoholism Z13.89 V79.1 LA ANNUAL ALCOHOL SCREEN 15 MIN 13. Screening for depression Z13.89 V79.0 [address] 14. Screening for diabetes mellitus Z13.1 V77.1 15. Screening for ischemic heart disease Z13.6 V81.0 Plan: 
 
Continue current medical regimen as outlined above.   Multiple medical problems as noted appear to be currently stable. Further recommendations based on lab results as ordered. Follow-up Disposition: Not on File I have reviewed with the patient details of the assessment and plan and all questions were answered. Relevent patient education was performed. Verbal and/or written instructions (see AVS) provided. The most recent lab findings were reviewed with the patient. Plan was discussed with patient who verbally expressed understanding. An After Visit Summary was printed and given to the patient.  
 
Irma Solis MD

## 2018-10-11 NOTE — TELEPHONE ENCOUNTER
Requested Prescriptions     Pending Prescriptions Disp Refills    mirabegron ER (MYRBETRIQ) 25 mg ER tablet 90 Tab 3     Sig: Take 1 Tab by mouth daily.        Last Refill:01/09/18  Next Appointmen t:03/25/18

## 2018-10-15 RX ORDER — AMLODIPINE BESYLATE 5 MG/1
TABLET ORAL
Qty: 30 TAB | Refills: 6 | Status: SHIPPED | OUTPATIENT
Start: 2018-10-15 | End: 2019-06-17 | Stop reason: SDUPTHER

## 2018-10-30 ENCOUNTER — DOCUMENTATION ONLY (OUTPATIENT)
Dept: INTERNAL MEDICINE CLINIC | Age: 83
End: 2018-10-30

## 2018-12-04 ENCOUNTER — OFFICE VISIT (OUTPATIENT)
Dept: NEUROLOGY | Age: 83
End: 2018-12-04

## 2018-12-04 VITALS
OXYGEN SATURATION: 97 % | WEIGHT: 134 LBS | HEART RATE: 62 BPM | DIASTOLIC BLOOD PRESSURE: 60 MMHG | RESPIRATION RATE: 12 BRPM | SYSTOLIC BLOOD PRESSURE: 148 MMHG | BODY MASS INDEX: 21.03 KG/M2 | HEIGHT: 67 IN

## 2018-12-04 DIAGNOSIS — I65.23 ASYMPTOMATIC CAROTID ARTERY STENOSIS, BILATERAL: ICD-10-CM

## 2018-12-04 DIAGNOSIS — G30.1 DEMENTIA OF THE ALZHEIMER'S TYPE WITH LATE ONSET WITHOUT BEHAVIORAL DISTURBANCE (HCC): ICD-10-CM

## 2018-12-04 DIAGNOSIS — G40.209 COMPLEX PARTIAL SEIZURE EVOLVING TO GENERALIZED SEIZURE (HCC): ICD-10-CM

## 2018-12-04 DIAGNOSIS — E53.8 B12 DEFICIENCY: ICD-10-CM

## 2018-12-04 DIAGNOSIS — R55 VASOVAGAL SYNCOPE: ICD-10-CM

## 2018-12-04 DIAGNOSIS — I69.30 LATE EFFECT OF STROKE: ICD-10-CM

## 2018-12-04 DIAGNOSIS — F02.80 DEMENTIA OF THE ALZHEIMER'S TYPE WITH LATE ONSET WITHOUT BEHAVIORAL DISTURBANCE (HCC): ICD-10-CM

## 2018-12-04 DIAGNOSIS — Z86.73 HISTORY OF CVA (CEREBROVASCULAR ACCIDENT): ICD-10-CM

## 2018-12-04 DIAGNOSIS — I63.312 THROMBOTIC STROKE INVOLVING LEFT MIDDLE CEREBRAL ARTERY (HCC): Primary | ICD-10-CM

## 2018-12-04 DIAGNOSIS — R41.3 MEMORY LOSS: ICD-10-CM

## 2018-12-04 DIAGNOSIS — G20 PARKINSONS DISEASE (HCC): ICD-10-CM

## 2018-12-04 DIAGNOSIS — I65.23 STENOSIS OF BOTH CAROTID ARTERIES WITHOUT CEREBRAL INFARCTION: ICD-10-CM

## 2018-12-04 DIAGNOSIS — R27.0 ATAXIA: ICD-10-CM

## 2018-12-04 DIAGNOSIS — R56.9 CONVULSIONS, UNSPECIFIED CONVULSION TYPE (HCC): ICD-10-CM

## 2018-12-04 DIAGNOSIS — G91.9 HYDROCEPHALUS (HCC): ICD-10-CM

## 2018-12-04 DIAGNOSIS — G20 PARKINSON DISEASE (HCC): ICD-10-CM

## 2018-12-04 RX ORDER — ASPIRIN AND DIPYRIDAMOLE 25; 200 MG/1; MG/1
CAPSULE, EXTENDED RELEASE ORAL
Qty: 180 CAP | Refills: 4 | Status: SHIPPED | OUTPATIENT
Start: 2018-12-04 | End: 2020-01-03

## 2018-12-04 NOTE — LETTER
12/4/2018 9:09 PM 
 
Patient:  Gerardo Orozco YOB: 1934 Date of Visit: 12/4/2018 Dear No Recipients: Thank you for referring Mr. Esperanza Taylor to me for evaluation/treatment. Below are the relevant portions of my assessment and plan of care. Consult Subjective:  
 
Gerardo Orozco is a 80 y.o. right-handed Afro-American male seen today for evaluation at the request of Dr. Asha Linton for evaluation of new problem of not taking his medication regularly, and having a few falls, and reluctant to use a walker, but at the insistence of his daughter is now beginning to get used to his walker and is falling less. He has Parkinson's disease moderate severe, and dementia and a history of strokes and seizures. He has not had any seizures on his Keppra 500 mg in the morning and 1000 mg at night. His last Keppra level was 26 March 2018. He also takes Azilect 1 mg a day for his Parkinson's disease also. Worsening gait, slowness of movement, more unsteadiness, and possible worsening of Parkinson's disease, orthostatic syncope, orthostatic hypotension, dementia, carotid stenosis and history of stroke, and seizures. Already on Sinemet 25/100 taking 2 pills 3 times a day and Azilect 1 mg once a day with worsening rigidity, slowness of movement, and difficulty moving his feet according to his daughter. Due to his age I am a little worried about adding a dopamine agonist, so we tried Zonegran as recommended by recent article in the literature showing improvement in motor symptoms of patient's with Parkinson's disease on this medication, but for some reason he did not tolerate it. .  Family advised the side effects, they will keep him well hydrated. Patient had repeat carotid Doppler study done March 2018 that show no significant stenosis and remained stable from those of one year ago.  His daughter states he has had no more syncope, and his Parkinson's disease remains more symptomatic but he has had no falls. He has moderate severe dementia, that is also stable and without behavioral problems. He had a history of seizures, and he is on Keppra 500 mg in the morning and 1000 mg at night since September last year and has not had any seizures. I thought this syncopal episode was probably vasovagal not a seizure, and his EEG and carotid Doppler and Keppra level were all okay. Patient's blood pressure was 146/60 without orthostatic changes today. He's had no side effects on the medication except for sedation. He has trouble walking, and shuffling his feet. He does have some urinary frequency and urgency and some dementia that is a progressive dementia and memory loss. He has a history of Parkinson's disease and recent stroke summer 2016. Patient was hospitalized June 2013 with a new left basal ganglion infarct and right hemiparesis and speech difficulties. He did receive physical therapy but still has mild speech impediment and aphasia. He should use a cane but he doesn't do it consistently, and cannot use a walker because he cannot coordinate himself despite urging of his daughter. He has had no new focal weakness, sensory loss, visual complaints, fever or trauma or meningismus or recurrent seizures. He has been told not to drive. He is on Aggrenox therapy and has not had any strokelike symptoms, and his carotid Doppler studies done today showed less than 50% in disease. His gait is worsening some and he has very short steps and shuffling gait, resembling normal pressure hydrocephalus, and CT scan and MRI could be compatible with that diagnosis, but I discussed further workup for that with his daughter, and she wants nothing to do with any type of neurosurgery, and feels he is stable and should be left alone. He has had no new focal weakness, sensory or focal neurologic deficits. On complete review of systems and symptoms, he has had no new medical problems complications or illness. 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 Past Medical History:  
Diagnosis Date  Arthritis of ankle 8/16/2017  Cancer ([facility] [address] Prostate.  Cellulitis 8/16/2017  Cerebrovascular disease 8/16/2017  Detrusor dysfunction 8/16/2017  Edema 8/16/2017  Elevated cholesterol 8/16/2017  Fatigue 8/16/2017  Gastrointestinal disorder   
 ulcer  Hypertension  On statin therapy 8/16/2017  Parkinsons disease ([address] 8/16/2017  Pernicious anemia 8/16/2017  Prostate cancer ([facility] [address] 8/16/2017  Raynaud disease 8/16/2017  Seizure disorder ([facility] [address] 8/16/2017  Tobacco abuse 8/16/2017  Weight loss 8/16/2017 Past Surgical History:  
Procedure Laterality Date [address] [place] UNLISTED Ulcer.  HX PROSTATECTOMY Family History Problem Relation Age of Onset  Dementia Mother  Stroke Father  Heart Disease Sister  Hypertension Sister  Hypertension Brother  Arthritis-osteo Brother  Heart Disease Brother  COPD Brother  Hypertension Child Social History Tobacco Use  Smoking status: Current Every Day Smoker Packs/day: 0.50  Smokeless tobacco: Never Used Substance Use Topics  Alcohol use: No  
   
Current Outpatient Medications Medication Sig Dispense Refill  aspirin-dipyridamole (AGGRENOX)  mg per SR capsule TAKE ONE CAPSULE BY MOUTH TWICE A  Cap 4  
 amLODIPine (NORVASC) 5 mg tablet TAKE 1 TABLET BY MOUTH EVERY DAY 30 Tab 6  
 mirabegron ER (MYRBETRIQ) 25 mg ER tablet Take 1 Tab by mouth daily.  90 Tab 3  
  tamsulosin (FLOMAX) 0.4 mg capsule TAKE ONE CAPSULE BY MOUTH DAILY 30 Cap 6  carbidopa-levodopa (SINEMET)  mg per tablet Take 2 Tabs by mouth three (3) times daily. 540 Tab 4  
 rasagiline (AZILECT) 1 mg tablet TAKE 1 TABLET BY MOUTH DAILY 90 Tab 5  levETIRAcetam (KEPPRA) 500 mg tablet 1 po qam and 2 po qhs 270 Tab 5  pravastatin (PRAVACHOL) 40 mg tablet TAKE 1 TABLET BY MOUTH DAILY 90 Tab 3  
 amLODIPine (NORVASC) 10 mg tablet Take 10 mg by mouth daily. Allergies Allergen Reactions  Pcn [Penicillins] Swelling Review of Systems: A comprehensive review of systems was negative except for: Constitutional: positive for fatigue and malaise Eyes: positive for visual disturbance Musculoskeletal: positive for myalgias, arthralgias and stiff joints Neurological: positive for memory problems, speech problems, coordination problems, gait problems, tremor and weakness Behvioral/Psych: positive for dementia, anxiety, behavior problems and depression Vitals:  
 12/04/18 1529 BP: 148/60 Pulse: 62 Resp: 12 SpO2: 97% Weight: 134 lb (60.8 kg) Height: 5' 7\" (1.702 m) Objective: I 
 
 
NEUROLOGICAL EXAM: 
 
Appearance: The patient is thinly developed and nourished, provides a poor history and is in no acute distress. Mental Status: Oriented to place and person he had difficulty with the time and the president. Patient cannot do serial sevens or remember 2 of 3 things at 30 seconds with distraction. Speech is a little hesitant, with a moderate expressive aphasia. Mood and affect appropriate, but mildly cantankerous. Patient is mildly aphasic and dysarthric Cranial Nerves:   Intact visual fields. Fundi are poorly seen and patient is blind in the left eye. Pupils react on the right and the left is minimally reactive, EOM's full, no nystagmus, no ptosis. Facial sensation is normal. Corneal reflexes are not tested. Facial movement is symmetric. Hearing is abnormal bilaterally. Palate is midline with normal sternocleidomastoid and trapezius muscles are normal. Tongue is midline. Neck without meningismus or bruits Temporal arteries are not tender or enlarged Motor:  4/5 strength in upper and lower proximal and distal muscles, with mild favoring of his right side and decreased rapid alternating movement on the right side. Normal bulk and increased tone with mild cogwheel rigidity and bradykinesia in all extremities. No fasciculations. Patient has decreased rapid altering movement in all extremities Reflexes:   Deep tendon reflexes 1+/4 and symmetrical. 
No babinski or clonus present Sensory:   Normal to touch, pinprick and DSS, and vibration decreased in both feet. Gait:  Abnormal gait, the patient having a very shuffling magnetic type gait. Patient needs walker to ambulate safely Tremor:   No tremor noted. Cerebellar:  Abnormal Romberg and tandem cerebellar signs present. Finger-nose-finger is unremarkable Neurovascular:  Normal heart sounds and regular rhythm, peripheral pulses decreased in both feet, and no carotid bruits. Assessment:  
 
Plan:  
 
Patient with new problem of receiving his medication, and we reinforced with him again the need to take his medication to be able to walk or otherwise he will have a seizure or fall in the hospital again. He is to continue using his rolling walker which is helping stabilize his gait. French Frazier He is to continue his Sinemet 2 pills 3 times a day and his Azilect also. His family does not want him to have physical therapy yet. 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 Patient to push p.o. Fluids and drink at least 4 glasses of water a day EEG, carotid Dopplers and Keppra levels were okay Patient is to continue Azilect 1 mg q.a.m. And Sinemet 25/100 taking 2 pills 3 times a day His daughter does not want any neurosurgical intervention for possible normal pressure hydrocephalus Daughter doesn't want any medication for memory loss Daughter doesn't think physical therapy will help, because he does not persist with the exercises Patient will call if any problems  In the interim Patient's condition discussed with patient's daughter and patient in detail Patient advised to remain mentally and physically active, exercising regularly, and take a multivitamin and vitamin C on a regular basis They will followup in 6 months, earlier if needed. Signed By: Winnie Camejo MD   
 December 4, 2018 This note will not be viewable in [address] If you have questions, please do not hesitate to call me. I look forward to following Mr. Tello along with you. Sincerely, Winnie Camejo MD

## 2018-12-04 NOTE — PATIENT INSTRUCTIONS
A Healthy Lifestyle: Care Instructions Your Care Instructions A healthy lifestyle can help you feel good, stay at a healthy weight, and have plenty of energy for both work and play. A healthy lifestyle is something you can share with your whole family. A healthy lifestyle also can lower your risk for serious health problems, such as high blood pressure, heart disease, and diabetes. You can follow a few steps listed below to improve your health and the health of your family. Follow-up care is a key part of your treatment and safety. Be sure to make and go to all appointments, and call your doctor if you are having problems. It's also a good idea to know your test results and keep a list of the medicines you take. How can you care for yourself at home? · Do not eat too much sugar, fat, or fast foods. You can still have dessert and treats now and then. The goal is moderation. · Start small to improve your eating habits. Pay attention to portion sizes, drink less juice and soda pop, and eat more fruits and vegetables. ? Eat a healthy amount of food. A 3-ounce serving of meat, for example, is about the size of a deck of cards. Fill the rest of your plate with vegetables and whole grains. ? Limit the amount of soda and sports drinks you have every day. Drink more water when you are thirsty. ? Eat at least 5 servings of fruits and vegetables every day. It may seem like a lot, but it is not hard to reach this goal. A serving or helping is 1 piece of fruit, 1 cup of vegetables, or 2 cups of leafy, raw vegetables. Have an apple or some carrot sticks as an afternoon snack instead of a candy bar. Try to have fruits and/or vegetables at every meal. 
· Make exercise part of your daily routine. You may want to start with simple activities, such as walking, bicycling, or slow swimming. Try to be active 30 to 60 minutes every day.  You do not need to do all 30 to 60 minutes all at once. For example, you can exercise 3 times a day for 10 or 20 minutes. Moderate exercise is safe for most people, but it is always a good idea to talk to your doctor before starting an exercise program. 
· Keep moving. [place] the lawn, work in the garden, or [employer]. Take the stairs instead of the elevator at work. · If you smoke, quit. People who smoke have an increased risk for heart attack, stroke, cancer, and other lung illnesses. Quitting is hard, but there are ways to boost your chance of quitting tobacco for good. ? Use nicotine gum, patches, or lozenges. ? Ask your doctor about stop-smoking programs and medicines. ? Keep trying. In addition to reducing your risk of diseases in the future, you will notice some benefits soon after you stop using tobacco. If you have shortness of breath or asthma symptoms, they will likely get better within a few weeks after you quit. · Limit how much alcohol you drink. Moderate amounts of alcohol (up to 2 drinks a day for men, 1 drink a day for women) are okay. But drinking too much can lead to liver problems, high blood pressure, and other health problems. Family health If you have a family, there are many things you can do together to improve your health. · Eat meals together as a family as often as possible. · Eat healthy foods. This includes fruits, vegetables, lean meats and dairy, and whole grains. · Include your family in your fitness plan. Most people think of activities such as jogging or tennis as the way to fitness, but there are many ways you and your family can be more active. Anything that makes you breathe hard and gets your heart pumping is exercise. Here are some tips: 
? Walk to do errands or to take your child to school or the bus. 
? Go for a family bike ride after dinner instead of watching TV. Where can you learn more? Go to http://ge[place]-grant.info/. Enter F777 in the search box to learn more about \"A Healthy Lifestyle: Care Instructions. \" Current as of: December 7, 2017 Content Version: 11.8 © 3416-2470 [employer]wise, [employer]. Care instructions adapted under license by [employer] (which disclaims liability or warranty for this information). If you have questions about a medical condition or this instruction, always ask your healthcare professional. [facility] any warranty or liability for your use of this information. Office Policieso Phone calls/patient messages: Please allow up to 24 hours for someone in the office to contact you about your call or message. Be mindful your provider may be out of the office or your message may require further review. We encourage you to use [employer] for your messages as this is a faster, more efficient way to communicate with our office 
o Medication Refills: 
Prescription medications require up to 48 business hours to process. We encourage you to use [employer] for your refills. For controlled medications: Please allow up to 72 business hours to process. Certain medications may require you to  a written prescription at our office. NO narcotic/controlled medications will be prescribed after 4pm Monday through Friday or on weekends 
o Form/Paperwork Completion: We ask that you allow 7-14 business days. You may also download your forms to [employer] to have your doctor print off. Effective December 28,2018, we will be moving across the [facility] to: 
[address] [place] [address] 2 [address] [place], [facility] [address] [place] Our phone number will remain the same at [phone]

## 2018-12-05 NOTE — PROGRESS NOTES
Consult Subjective:  
 
Guanakito Krueger. is a 80 y.o. right-handed Afro-American male seen today for evaluation at the request of Dr. Marlee Jones for evaluation of new problem of not taking his medication regularly, and having a few falls, and reluctant to use a walker, but at the insistence of his daughter is now beginning to get used to his walker and is falling less. He has Parkinson's disease moderate severe, and dementia and a history of strokes and seizures. He has not had any seizures on his Keppra 500 mg in the morning and 1000 mg at night. His last Keppra level was 26 March 2018. He also takes Azilect 1 mg a day for his Parkinson's disease also. Worsening gait, slowness of movement, more unsteadiness, and possible worsening of Parkinson's disease, orthostatic syncope, orthostatic hypotension, dementia, carotid stenosis and history of stroke, and seizures. Already on Sinemet 25/100 taking 2 pills 3 times a day and Azilect 1 mg once a day with worsening rigidity, slowness of movement, and difficulty moving his feet according to his daughter. Due to his age I am a little worried about adding a dopamine agonist, so we tried Zonegran as recommended by recent article in the literature showing improvement in motor symptoms of patient's with Parkinson's disease on this medication, but for some reason he did not tolerate it. .  Family advised the side effects, they will keep him well hydrated. Patient had repeat carotid Doppler study done March 2018 that show no significant stenosis and remained stable from those of one year ago. His daughter states he has had no more syncope, and his Parkinson's disease remains more symptomatic but he has had no falls. He has moderate severe dementia, that is also stable and without behavioral problems.  He had a history of seizures, and he is on Keppra 500 mg in the morning and 1000 mg at night since September last year and has not had any seizures. I thought this syncopal episode was probably vasovagal not a seizure, and his EEG and carotid Doppler and Keppra level were all okay. Patient's blood pressure was 146/60 without orthostatic changes today. He's had no side effects on the medication except for sedation. He has trouble walking, and shuffling his feet. He does have some urinary frequency and urgency and some dementia that is a progressive dementia and memory loss. He has a history of Parkinson's disease and recent stroke summer 2016. Patient was hospitalized June 2013 with a new left basal ganglion infarct and right hemiparesis and speech difficulties. He did receive physical therapy but still has mild speech impediment and aphasia. He should use a cane but he doesn't do it consistently, and cannot use a walker because he cannot coordinate himself despite urging of his daughter. He has had no new focal weakness, sensory loss, visual complaints, fever or trauma or meningismus or recurrent seizures. He has been told not to drive. He is on Aggrenox therapy and has not had any strokelike symptoms, and his carotid Doppler studies done today showed less than 50% in disease. His gait is worsening some and he has very short steps and shuffling gait, resembling normal pressure hydrocephalus, and CT scan and MRI could be compatible with that diagnosis, but I discussed further workup for that with his daughter, and she wants nothing to do with any type of neurosurgery, and feels he is stable and should be left alone. He has had no new focal weakness, sensory or focal neurologic deficits. On complete review of systems and symptoms, he has had no new medical problems complications or illness.  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 Past Medical History:  
Diagnosis Date  Arthritis of ankle 8/16/2017  Cancer ([facility] Prostate.  Cellulitis 8/16/2017  Cerebrovascular disease 8/16/2017  Detrusor dysfunction 8/16/2017  Edema 8/16/2017  Elevated cholesterol 8/16/2017  Fatigue 8/16/2017  Gastrointestinal disorder   
 ulcer  Hypertension  On statin therapy 8/16/2017  Parkinsons disease ([facility] 8/16/2017  Pernicious anemia 8/16/2017  Prostate cancer ([facility] 8/16/2017  Raynaud disease 8/16/2017  [facility] 8/16/2017  Tobacco abuse 8/16/2017  Weight loss 8/16/2017 Past Surgical History:  
Procedure Laterality Date [address] UNLISTED Ulcer.  HX PROSTATECTOMY Family History Problem Relation Age of Onset  Dementia Mother  Stroke Father  Heart Disease Sister  Hypertension Sister  Hypertension Brother  Arthritis-osteo Brother  Heart Disease Brother  COPD Brother  Hypertension Child Social History Tobacco Use  Smoking status: Current Every Day Smoker Packs/day: 0.50  Smokeless tobacco: Never Used Substance Use Topics  Alcohol use: No  
   
Current Outpatient Medications Medication Sig Dispense Refill  aspirin-dipyridamole (AGGRENOX)  mg per SR capsule TAKE ONE CAPSULE BY MOUTH TWICE A  Cap 4  
 amLODIPine (NORVASC) 5 mg tablet TAKE 1 TABLET BY MOUTH EVERY DAY 30 Tab 6  
 mirabegron ER (MYRBETRIQ) 25 mg ER tablet Take 1 Tab by mouth daily. 90 Tab 3  
 tamsulosin (FLOMAX) 0.4 mg capsule TAKE ONE CAPSULE BY MOUTH DAILY 30 Cap 6  carbidopa-levodopa (SINEMET)  mg per tablet Take 2 Tabs by mouth three (3) times daily. 540 Tab 4  
 rasagiline (AZILECT) 1 mg tablet TAKE 1 TABLET BY MOUTH DAILY 90 Tab 5  levETIRAcetam (KEPPRA) 500 mg tablet 1 po qam and 2 po qhs 270 Tab 5  pravastatin (PRAVACHOL) 40 mg tablet TAKE 1 TABLET BY MOUTH DAILY 90 Tab 3  
 amLODIPine (NORVASC) 10 mg tablet Take 10 mg by mouth daily. Allergies Allergen Reactions  Pcn [Penicillins] Swelling Review of Systems: A comprehensive review of systems was negative except for: Constitutional: positive for fatigue and malaise Eyes: positive for visual disturbance Musculoskeletal: positive for myalgias, arthralgias and stiff joints Neurological: positive for memory problems, speech problems, coordination problems, gait problems, tremor and weakness Behvioral/Psych: positive for dementia, anxiety, behavior problems and depression Vitals:  
 12/04/18 1529 BP: 148/60 Pulse: 62 Resp: 12 SpO2: 97% Weight: 134 lb (60.8 kg) Height: 5' 7\" (1.702 m) Objective: I 
 
 
NEUROLOGICAL EXAM: 
 
Appearance: The patient is thinly developed and nourished, provides a poor history and is in no acute distress. Mental Status: Oriented to place and person he had difficulty with the time and the president. Patient cannot do serial sevens or remember 2 of 3 things at 30 seconds with distraction. Speech is a little hesitant, with a moderate expressive aphasia. Mood and affect appropriate, but mildly cantankerous. Patient is mildly aphasic and dysarthric Cranial Nerves:   Intact visual fields. Fundi are poorly seen and patient is blind in the left eye. Pupils react on the right and the left is minimally reactive, EOM's full, no nystagmus, no ptosis. Facial sensation is normal. Corneal reflexes are not tested. Facial movement is symmetric. Hearing is abnormal bilaterally. Palate is midline with normal sternocleidomastoid and trapezius muscles are normal. Tongue is midline. Neck without meningismus or bruits Temporal arteries are not tender or enlarged Motor:  4/5 strength in upper and lower proximal and distal muscles, with mild favoring of his right side and decreased rapid alternating movement on the right side. Normal bulk and increased tone with mild cogwheel rigidity and bradykinesia in all extremities. No fasciculations. Patient has decreased rapid altering movement in all extremities Reflexes:   Deep tendon reflexes 1+/4 and symmetrical. 
No babinski or clonus present Sensory:   Normal to touch, pinprick and DSS, and vibration decreased in both feet. Gait:  Abnormal gait, the patient having a very shuffling magnetic type gait. Patient needs walker to ambulate safely Tremor:   No tremor noted. Cerebellar:  Abnormal Romberg and tandem cerebellar signs present. Finger-nose-finger is unremarkable Neurovascular:  Normal heart sounds and regular rhythm, peripheral pulses decreased in both feet, and no carotid bruits. Assessment:  
 
Plan:  
 
Patient with new problem of receiving his medication, and we reinforced with him again the need to take his medication to be able to walk or otherwise he will have a seizure or fall in the hospital again. He is to continue using his rolling walker which is helping stabilize his gait. Lindsey Meyer He is to continue his Sinemet 2 pills 3 times a day and his Azilect also. His family does not want him to have physical therapy yet. 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 Parkinson's disease moderately severe Patient encouraged to remain mentally and physically active, try to eat more, and take his medications regularly and take his multivitamins and vitamin D Shuffling gait with MRI scan showing prominent ventriculomegaly and severe microvascular disease, possible normal pressure hydrocephalus or senile gait apraxia, no clear ventriculomegaly on followup scans this spring History this patient strokes and severe white matter disease, continue Aggrenox Neurogenic bladder, rule out normal pressure hydrocephalus Carotid stenosis mild, repeat March 2018 still showed less than 50% disease bilaterally Patient with previous stroke, stable on Aggrenox, refills ordered, and patient has old right hemiparesis. Patient to push p.o. Fluids and drink at least 4 glasses of water a day EEG, carotid Dopplers and Keppra levels were okay Patient is to continue Azilect 1 mg q.a.m. And Sinemet 25/100 taking 2 pills 3 times a day His daughter does not want any neurosurgical intervention for possible normal pressure hydrocephalus Daughter doesn't want any medication for memory loss Daughter doesn't think physical therapy will help, because he does not persist with the exercises Patient will call if any problems  In the interim Patient's condition discussed with patient's daughter and patient in detail Patient advised to remain mentally and physically active, exercising regularly, and take a multivitamin and vitamin C on a regular basis They will followup in 6 months, earlier if needed. Signed By: Sandra Bowling MD   
 December 4, 2018 This note will not be viewable in [address]

## 2018-12-31 ENCOUNTER — DOCUMENTATION ONLY (OUTPATIENT)
Dept: INTERNAL MEDICINE CLINIC | Age: 83
End: 2018-12-31

## 2019-01-17 ENCOUNTER — TELEPHONE (OUTPATIENT)
Dept: NEUROLOGY | Age: 84
End: 2019-01-17

## 2019-01-17 NOTE — TELEPHONE ENCOUNTER
Received call from [facility] (listed on HIPAA), she stated that the patient was given a rx for \"zonisamide. \" She said the patient was taking Levetiracetam, and she wants to know if he is supposed to be taking the zonisamide or the levetiracetam or both. She would like a call back please. [phone]

## 2019-01-17 NOTE — TELEPHONE ENCOUNTER
By reading your note, it looks like the patient should be taking both medications. Keppra for seizures and Zonegran for parkinson's. Please advise if this is correct

## 2019-02-25 RX ORDER — PRAVASTATIN SODIUM 40 MG/1
40 TABLET ORAL DAILY
Qty: 90 TAB | Refills: 3 | Status: SHIPPED | OUTPATIENT
Start: 2019-02-25 | End: 2020-03-11

## 2019-02-25 NOTE — TELEPHONE ENCOUNTER
Requested Prescriptions     Pending Prescriptions Disp Refills    pravastatin (PRAVACHOL) 40 mg tablet 90 Tab 3     Sig: Take 1 Tab by mouth daily.        Last Refill: 02/26/18  Next Appointment:03/25/19

## 2019-02-26 DIAGNOSIS — R41.3 MEMORY LOSS: ICD-10-CM

## 2019-02-26 DIAGNOSIS — F02.80 DEMENTIA OF THE ALZHEIMER'S TYPE WITH LATE ONSET WITHOUT BEHAVIORAL DISTURBANCE (HCC): ICD-10-CM

## 2019-02-26 DIAGNOSIS — I69.30 LATE EFFECT OF STROKE: ICD-10-CM

## 2019-02-26 DIAGNOSIS — Z86.73 HISTORY OF CVA (CEREBROVASCULAR ACCIDENT): ICD-10-CM

## 2019-02-26 DIAGNOSIS — G20 PARKINSON DISEASE (HCC): ICD-10-CM

## 2019-02-26 DIAGNOSIS — G40.209 COMPLEX PARTIAL SEIZURE EVOLVING TO GENERALIZED SEIZURE (HCC): ICD-10-CM

## 2019-02-26 DIAGNOSIS — R56.9 CONVULSIONS, UNSPECIFIED CONVULSION TYPE (HCC): ICD-10-CM

## 2019-02-26 DIAGNOSIS — R55 VASOVAGAL SYNCOPE: ICD-10-CM

## 2019-02-26 DIAGNOSIS — R27.0 ATAXIA: ICD-10-CM

## 2019-02-26 DIAGNOSIS — I65.23 STENOSIS OF BOTH CAROTID ARTERIES WITHOUT CEREBRAL INFARCTION: ICD-10-CM

## 2019-02-26 DIAGNOSIS — I65.23 ASYMPTOMATIC CAROTID ARTERY STENOSIS, BILATERAL: ICD-10-CM

## 2019-02-26 DIAGNOSIS — G30.1 DEMENTIA OF THE ALZHEIMER'S TYPE WITH LATE ONSET WITHOUT BEHAVIORAL DISTURBANCE (HCC): ICD-10-CM

## 2019-02-26 DIAGNOSIS — E53.8 B12 DEFICIENCY: ICD-10-CM

## 2019-02-26 DIAGNOSIS — I63.312 THROMBOTIC STROKE INVOLVING LEFT MIDDLE CEREBRAL ARTERY (HCC): ICD-10-CM

## 2019-02-26 NOTE — TELEPHONE ENCOUNTER
Future Appointments   Date Time Provider Nakita Henryi   3/25/2019  1:00 PM Astrid Mcfarland MD [address]   7/9/2019  3:40 PM MD Angela Sanchez                         Last Appointment My Department:  12/4/18    Please advise of refill below.   Requested Prescriptions     Pending Prescriptions Disp Refills    rasagiline (AZILECT) 1 mg tablet 90 Tab 5     Sig: TAKE 1 TABLET BY MOUTH DAILY

## 2019-02-27 RX ORDER — RASAGILINE 1 MG/1
TABLET ORAL
Qty: 90 TAB | Refills: 5 | Status: SHIPPED | OUTPATIENT
Start: 2019-02-27 | End: 2020-03-12

## 2019-03-07 ENCOUNTER — DOCUMENTATION ONLY (OUTPATIENT)
Dept: INTERNAL MEDICINE CLINIC | Age: 84
End: 2019-03-07

## 2019-03-08 NOTE — TELEPHONE ENCOUNTER
Requested Prescriptions     Pending Prescriptions Disp Refills    tamsulosin (FLOMAX) 0.4 mg capsule 90 Cap 3     Sig: Take 1 Cap by mouth daily.        Last Refill: 08/17/18  Next Appointment:03/25/19

## 2019-03-11 RX ORDER — TAMSULOSIN HYDROCHLORIDE 0.4 MG/1
0.4 CAPSULE ORAL DAILY
Qty: 90 CAP | Refills: 3 | Status: SHIPPED | OUTPATIENT
Start: 2019-03-11 | End: 2020-03-11

## 2019-03-25 ENCOUNTER — OFFICE VISIT (OUTPATIENT)
Dept: INTERNAL MEDICINE CLINIC | Age: 84
End: 2019-03-25

## 2019-03-25 VITALS
TEMPERATURE: 97.9 F | BODY MASS INDEX: 21.19 KG/M2 | SYSTOLIC BLOOD PRESSURE: 140 MMHG | RESPIRATION RATE: 16 BRPM | HEART RATE: 68 BPM | WEIGHT: 135 LBS | OXYGEN SATURATION: 97 % | HEIGHT: 67 IN | DIASTOLIC BLOOD PRESSURE: 68 MMHG

## 2019-03-25 DIAGNOSIS — N31.8 DETRUSOR DYSFUNCTION: ICD-10-CM

## 2019-03-25 DIAGNOSIS — G20 PARKINSON DISEASE (HCC): ICD-10-CM

## 2019-03-25 DIAGNOSIS — F02.80 DEMENTIA OF THE ALZHEIMER'S TYPE WITH LATE ONSET WITHOUT BEHAVIORAL DISTURBANCE (HCC): ICD-10-CM

## 2019-03-25 DIAGNOSIS — G30.1 DEMENTIA OF THE ALZHEIMER'S TYPE WITH LATE ONSET WITHOUT BEHAVIORAL DISTURBANCE (HCC): ICD-10-CM

## 2019-03-25 DIAGNOSIS — G40.909 SEIZURE DISORDER (HCC): ICD-10-CM

## 2019-03-25 DIAGNOSIS — I63.312 THROMBOTIC STROKE INVOLVING LEFT MIDDLE CEREBRAL ARTERY (HCC): ICD-10-CM

## 2019-03-25 DIAGNOSIS — I10 ESSENTIAL HYPERTENSION: Primary | Chronic | ICD-10-CM

## 2019-03-25 DIAGNOSIS — Z79.899 ON STATIN THERAPY: ICD-10-CM

## 2019-03-25 DIAGNOSIS — E78.00 ELEVATED CHOLESTEROL: ICD-10-CM

## 2019-03-25 LAB
A-G RATIO,AGRAT: 1.5 RATIO
ALBUMIN SERPL-MCNC: 4.5 G/DL (ref 3.9–5.4)
ALP SERPL-CCNC: 87 U/L (ref 38–126)
ALT SERPL-CCNC: 22 U/L (ref 9–52)
ANION GAP SERPL CALC-SCNC: 17 MMOL/L
AST SERPL W P-5'-P-CCNC: 46 U/L (ref 14–36)
BILIRUB SERPL-MCNC: 0.3 MG/DL (ref 0.2–1.3)
BUN SERPL-MCNC: 30 MG/DL (ref 9–20)
BUN/CREATININE RATIO,BUCR: 23 RATIO
CALCIUM SERPL-MCNC: 10.3 MG/DL (ref 8.4–10.2)
CHLORIDE SERPL-SCNC: 104 MMOL/L (ref 98–107)
CHOL/HDL RATIO,CHHD: 2 RATIO (ref 0–4)
CHOLEST SERPL-MCNC: 149 MG/DL (ref 0–200)
CK SERPL-CCNC: 51 U/L (ref 30–135)
CO2 SERPL-SCNC: 25 MMOL/L (ref 22–32)
CREAT SERPL-MCNC: 1.3 MG/DL (ref 0.8–1.5)
GLOBULIN,GLOB: 3.1
GLUCOSE SERPL-MCNC: 70 MG/DL (ref 75–110)
HDLC SERPL-MCNC: 86 MG/DL (ref 35–130)
LDL/HDL RATIO,LDHD: 1 RATIO
LDLC SERPL CALC-MCNC: 48 MG/DL (ref 0–130)
POTASSIUM SERPL-SCNC: 4.9 MMOL/L (ref 3.6–5)
PROT SERPL-MCNC: 7.6 G/DL (ref 6.3–8.2)
SODIUM SERPL-SCNC: 146 MMOL/L (ref 137–145)
TRIGL SERPL-MCNC: 77 MG/DL (ref 0–200)
VLDLC SERPL CALC-MCNC: 15 MG/DL

## 2019-03-25 RX ORDER — ZONISAMIDE 100 MG/1
100 CAPSULE ORAL DAILY
COMMUNITY
End: 2019-09-25 | Stop reason: SDUPTHER

## 2019-03-25 NOTE — PROGRESS NOTES
Charly Kidney presents today at the clinic for Chief Complaint Patient presents with  Hypertension  
  follow up  Cholesterol Problem  
  follow up  Parkinsons Disease  
  follow up Wt Readings from Last 3 Encounters:  
03/25/19 135 lb (61.2 kg) 12/04/18 134 lb (60.8 kg) 09/21/18 128 lb (58.1 kg) Temp Readings from Last 3 Encounters:  
03/25/19 97.9 °F (36.6 °C) (Oral) 03/06/18 98.8 °F (37.1 °C) (Oral) 04/22/14 97.8 °F (36.6 °C) BP Readings from Last 3 Encounters:  
03/25/19 140/68  
12/04/18 148/60  
09/21/18 149/64 Pulse Readings from Last 3 Encounters:  
03/25/19 68  
12/04/18 62  
09/21/18 81 Health Maintenance Due Topic  DTaP/Tdap/Td series (1 - Tdap)  Shingrix Vaccine Age 50> (1 of 2)  GLAUCOMA SCREENING Q2Y Learning Assessment: 
:  
 
Learning Assessment 3/22/2018 9/6/2017 4/19/2016 PRIMARY LEARNER Patient Patient Patient HIGHEST LEVEL OF EDUCATION - PRIMARY LEARNER  - DID NOT GRADUATE HIGH SCHOOL -  
BARRIERS PRIMARY LEARNER - NONE -  
CO-LEARNER CAREGIVER - No - PRIMARY LANGUAGE ENGLISH ENGLISH ENGLISH  
LEARNER PREFERENCE PRIMARY DEMONSTRATION READING DEMONSTRATION  
ANSWERED BY patient patient patient RELATIONSHIP SELF SELF SELF Depression Screening: 
:  
 
3 most recent PHQ Screens 3/25/2019 PHQ Not Done - Little interest or pleasure in doing things Not at all Feeling down, depressed, irritable, or hopeless Not at all Total Score PHQ 2 0 Fall Risk Assessment: 
:  
 
Fall Risk Assessment, last 12 mths 3/25/2019 Able to walk? Yes Fall in past 12 months? Yes Fall with injury? No  
Number of falls in past 12 months 2 Fall Risk Score 2 Abuse Screening: 
:  
 
Abuse Screening Questionnaire 3/25/2019 Do you ever feel afraid of your partner? Rianna Gore Are you in a relationship with someone who physically or mentally threatens you? Rianna Gore Is it safe for you to go home?  Yesenia Tripathi  
 
 
 Coordination of Care Questionnaire: 
:  
 
1. Have you been to the ER, urgent care clinic since your last visit? Hospitalized since your last visit? no 
 
2. Have you seen or consulted any other health care providers outside of the [address] [place] [address] since your last visit? Include any pap smears or colon screening. Saw Dr Delmis Gonzáles on 12/4/18.

## 2019-03-25 NOTE — PROGRESS NOTES
This note will not be viewable in [address] Marissa Juarez. is a 80 y.o. male and presents with Hypertension (follow up); Cholesterol Problem (follow up); and Parkinsons Disease (follow up) Leonel Medrano Subjective: 
 
Mr. Kaylen Lobo presents today for follow-up of hypertension, hyperlipidemia, monitoring of statin therapy, history of stroke, and Parkinson's disease. He has a history of seizure disorder as well as some dementia and is followed by he was placed on a new medication since his last visit and has not reported any side effects from this medication (zonisamide). He still wants to drive his car. He is ambulatory with a rolling walker. He has a shuffling gait. He has masklike facies. He denies a tremor. He is accompanied by his daughter. He does note increased swelling in his feet especially at nighttime. This goes down overnight and is improved in the mornings. He denies any PND orthopnea or pedal edema. Review of Systems Constitutional:  
Eyes:   negative for visual disturbance and irritation ENT:   negative for tinnitus,sore throat,nasal congestion,ear pains. hoarseness Respiratory:  negative for cough, hemoptysis, dyspnea,wheezing CV:   negative for chest pain, palpitations, lower extremity edema GI:   negative for nausea, vomiting, diarrhea, abdominal pain,melena Endo:               negative for polyuria,polydipsia,polyphagia,heat intolerance Genitourinary: negative for frequency, dysuria and hematuria Integumentary: negative for rash and pruritus Hematologic:  negative for easy bruising and gum/nose bleeding Musculoskel: negative for myalgias, arthralgias, back pain, muscle weakness, joint pain Neurological:  negative for headaches, dizziness, vertigo, memory problems and gait Behavl/Psych: negative for feelings of anxiety, depression, mood changes Past Medical History:  
Diagnosis Date  Arthritis of ankle 8/16/2017  Cancer ([facility] Prostate.  Cellulitis 8/16/2017  Cerebrovascular disease 8/16/2017  Detrusor dysfunction 8/16/2017  Edema 8/16/2017  Elevated cholesterol 8/16/2017  Fatigue 8/16/2017  Gastrointestinal disorder   
 ulcer  Hypertension  On statin therapy 8/16/2017  Parkinsons disease ([facility] 8/16/2017  Pernicious anemia 8/16/2017  Prostate cancer ([facility] 8/16/2017  Raynaud disease 8/16/2017  Seizure disorder ([facility] 8/16/2017  Tobacco abuse 8/16/2017  Weight loss 8/16/2017 Past Surgical History:  
Procedure Laterality Date [address] UNLISTED Ulcer.  HX PROSTATECTOMY Social History Socioeconomic History  Marital status: SINGLE Spouse name: Not on file  Number of children: Not on file  Years of education: Not on file  Highest education level: Not on file Tobacco Use  Smoking status: Current Every Day Smoker Packs/day: 0.50  Smokeless tobacco: Never Used Substance and Sexual Activity  Alcohol use: No  
 Drug use: No  
 Sexual activity: Not Currently Family History Problem Relation Age of Onset  Dementia Mother  Stroke Father  Heart Disease Sister  Hypertension Sister  Hypertension Brother  Arthritis-osteo Brother  Heart Disease Brother  COPD Brother  Hypertension Child Current Outpatient Medications Medication Sig Dispense Refill  zonisamide (ZONEGRAN) 100 mg capsule Take  by mouth daily.  multivit with minerals/lutein (MULTIVITAMIN 50 PLUS PO) Take  by mouth.  tamsulosin (FLOMAX) 0.4 mg capsule Take 1 Cap by mouth daily. 90 Cap 3  
 rasagiline (AZILECT) 1 mg tablet TAKE 1 TABLET BY MOUTH DAILY 90 Tab 5  pravastatin (PRAVACHOL) 40 mg tablet Take 1 Tab by mouth daily. 90 Tab 3  
 aspirin-dipyridamole (AGGRENOX)  mg per SR capsule TAKE ONE CAPSULE BY MOUTH TWICE A  Cap 4  
 amLODIPine (NORVASC) 5 mg tablet TAKE 1 TABLET BY MOUTH EVERY DAY 30 Tab 6  mirabegron ER (MYRBETRIQ) 25 mg ER tablet Take 1 Tab by mouth daily. 90 Tab 3  carbidopa-levodopa (SINEMET)  mg per tablet Take 2 Tabs by mouth three (3) times daily. 540 Tab 4  
 levETIRAcetam (KEPPRA) 500 mg tablet 1 po qam and 2 po qhs 270 Tab 5 Allergies Allergen Reactions  Pcn [Penicillins] Swelling Objective: 
Visit Vitals /68 (BP 1 Location: Right arm, BP Patient Position: Sitting) Pulse 68 Temp 97.9 °F (36.6 °C) (Oral) Resp 16 Ht 5' 7\" (1.702 m) Wt 135 lb (61.2 kg) SpO2 97% BMI 21.14 kg/m² Physical Exam:  
General appearance - alert, well appearing, and in no distress Mental status - alert, oriented to person, place, and time EYE-YARY, EOMI, fundi normal, corneas normal, no foreign bodies ENT-ENT exam normal, no neck nodes or sinus tenderness Nose - normal and patent, no erythema, discharge or polyps Mouth - mucous membranes moist, pharynx normal without lesions Neck - supple, no significant adenopathy Chest - clear to auscultation, no wheezes, rales or rhonchi, symmetric air entry Heart - normal rate, regular rhythm, normal S1, S2, no murmurs, rubs, clicks or gallops Abdomen - soft, nontender, nondistended, no masses or organomegaly Lymph- no adenopathy palpable Ext-peripheral pulses normal, no pedal edema, no clubbing or cyanosis Skin-Warm and dry. no hyperpigmentation, vitiligo, or suspicious lesions Neuro -alert, oriented, normal speech, no focal findings or movement disorder noted Musculoskeletal- FROM, no bony abnormalities, no point tenderness No results found for this visit on 03/25/19. All results for lab orders may not have been returned by the time this encountered was closed. Assessment/Plan: ICD-10-CM ICD-9-CM 1. Essential hypertension S33 820.2 METABOLIC PANEL, COMPREHENSIVE 2. Thrombotic stroke involving left middle cerebral artery ([facility] I63.312 434.01   
3. Parkinson disease ([facility] G20 332.0 4. 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   
5. Seizure disorder ([facility] [address] G40.909 345.90   
6. Detrusor dysfunction N31.8 596.59   
7. On statin therapy Z79.899 V58.69 CK  
   LIPID PANEL 8. Elevated cholesterol E78.00 272.0 LIPID PANEL Orders Placed This Encounter  CK ([facility])  LIPID PANEL ([facility])  METABOLIC PANEL, COMPREHENSIVE ([facility])  zonisamide (ZONEGRAN) 100 mg capsule Sig: Take  by mouth daily.  multivit with minerals/lutein (MULTIVITAMIN 50 PLUS PO) Sig: Take  by mouth. Follow-up and Dispositions · Return in about 6 months (around 9/25/2019) for follow up. Plan: The patient will continue his current medical regimen. Follow-up labs as ordered. I have reviewed with the patient details of the assessment and plan and all questions were answered. Relevent patient education was performed. Verbal and/or written instructions (see AVS) provided. The most recent lab findings were reviewed with the patient. Plan was discussed with patient who verbal expressed understanding. An After Visit Summary was printed and given to the patient.  
 
 
Alicja Ac MD

## 2019-04-02 ENCOUNTER — TELEPHONE (OUTPATIENT)
Dept: INTERNAL MEDICINE CLINIC | Age: 84
End: 2019-04-02

## 2019-04-02 NOTE — TELEPHONE ENCOUNTER
Patients daughter, Dung Uribe is calling, she is requesting a call back from the nurse in regards to helping her get the patient long term care. She states that a  is coming out to evaluate the patient tomorrow, however they are requesting a letter with the patients 'diagnostics' and date of current diagnoses's (parkinson's is the only one she could remember).     Best call back number for Sheree: [phone]

## 2019-04-12 DIAGNOSIS — G40.209 COMPLEX PARTIAL SEIZURE EVOLVING TO GENERALIZED SEIZURE (HCC): ICD-10-CM

## 2019-04-12 DIAGNOSIS — R27.0 ATAXIA: ICD-10-CM

## 2019-04-12 DIAGNOSIS — I69.30 LATE EFFECT OF STROKE: ICD-10-CM

## 2019-04-12 DIAGNOSIS — F02.80 DEMENTIA OF THE ALZHEIMER'S TYPE WITH LATE ONSET WITHOUT BEHAVIORAL DISTURBANCE (HCC): ICD-10-CM

## 2019-04-12 DIAGNOSIS — R41.3 MEMORY LOSS: ICD-10-CM

## 2019-04-12 DIAGNOSIS — I63.312 THROMBOTIC STROKE INVOLVING LEFT MIDDLE CEREBRAL ARTERY (HCC): ICD-10-CM

## 2019-04-12 DIAGNOSIS — E53.8 B12 DEFICIENCY: ICD-10-CM

## 2019-04-12 DIAGNOSIS — R56.9 CONVULSIONS, UNSPECIFIED CONVULSION TYPE (HCC): ICD-10-CM

## 2019-04-12 DIAGNOSIS — G20 PARKINSON DISEASE (HCC): ICD-10-CM

## 2019-04-12 DIAGNOSIS — G30.1 DEMENTIA OF THE ALZHEIMER'S TYPE WITH LATE ONSET WITHOUT BEHAVIORAL DISTURBANCE (HCC): ICD-10-CM

## 2019-04-12 DIAGNOSIS — R55 VASOVAGAL SYNCOPE: ICD-10-CM

## 2019-04-12 DIAGNOSIS — I65.23 ASYMPTOMATIC CAROTID ARTERY STENOSIS, BILATERAL: ICD-10-CM

## 2019-04-12 DIAGNOSIS — Z86.73 HISTORY OF CVA (CEREBROVASCULAR ACCIDENT): ICD-10-CM

## 2019-04-12 DIAGNOSIS — I65.23 STENOSIS OF BOTH CAROTID ARTERIES WITHOUT CEREBRAL INFARCTION: ICD-10-CM

## 2019-04-12 RX ORDER — LEVETIRACETAM 500 MG/1
TABLET ORAL
Qty: 270 TAB | Refills: 4 | Status: SHIPPED | OUTPATIENT
Start: 2019-04-12 | End: 2021-12-31

## 2019-05-26 DIAGNOSIS — R56.9 CONVULSIONS, UNSPECIFIED CONVULSION TYPE (HCC): ICD-10-CM

## 2019-05-26 DIAGNOSIS — F02.80 DEMENTIA OF THE ALZHEIMER'S TYPE WITH LATE ONSET WITHOUT BEHAVIORAL DISTURBANCE (HCC): ICD-10-CM

## 2019-05-26 DIAGNOSIS — I65.23 ASYMPTOMATIC CAROTID ARTERY STENOSIS, BILATERAL: ICD-10-CM

## 2019-05-26 DIAGNOSIS — R27.0 ATAXIA: ICD-10-CM

## 2019-05-26 DIAGNOSIS — Z86.73 HISTORY OF CVA (CEREBROVASCULAR ACCIDENT): ICD-10-CM

## 2019-05-26 DIAGNOSIS — I69.30 LATE EFFECT OF STROKE: ICD-10-CM

## 2019-05-26 DIAGNOSIS — I63.312 THROMBOTIC STROKE INVOLVING LEFT MIDDLE CEREBRAL ARTERY (HCC): ICD-10-CM

## 2019-05-26 DIAGNOSIS — G30.1 DEMENTIA OF THE ALZHEIMER'S TYPE WITH LATE ONSET WITHOUT BEHAVIORAL DISTURBANCE (HCC): ICD-10-CM

## 2019-05-26 DIAGNOSIS — I65.23 STENOSIS OF BOTH CAROTID ARTERIES WITHOUT CEREBRAL INFARCTION: ICD-10-CM

## 2019-05-26 DIAGNOSIS — G20 PARKINSON DISEASE (HCC): ICD-10-CM

## 2019-05-26 DIAGNOSIS — E53.8 B12 DEFICIENCY: ICD-10-CM

## 2019-05-26 DIAGNOSIS — G40.209 COMPLEX PARTIAL SEIZURE EVOLVING TO GENERALIZED SEIZURE (HCC): ICD-10-CM

## 2019-05-26 DIAGNOSIS — R41.3 MEMORY LOSS: ICD-10-CM

## 2019-05-26 DIAGNOSIS — R55 VASOVAGAL SYNCOPE: ICD-10-CM

## 2019-05-28 RX ORDER — ZONISAMIDE 100 MG/1
100 CAPSULE ORAL DAILY
Qty: 90 CAP | Refills: 3 | Status: SHIPPED | OUTPATIENT
Start: 2019-05-28 | End: 2020-05-22

## 2019-05-28 RX ORDER — CARBIDOPA AND LEVODOPA 25; 100 MG/1; MG/1
2 TABLET ORAL 3 TIMES DAILY
Qty: 540 TAB | Refills: 2 | Status: SHIPPED | OUTPATIENT
Start: 2019-05-28 | End: 2020-03-01

## 2019-06-17 RX ORDER — AMLODIPINE BESYLATE 5 MG/1
TABLET ORAL
Qty: 30 TAB | Refills: 6 | Status: SHIPPED | OUTPATIENT
Start: 2019-06-17 | End: 2019-12-12 | Stop reason: SDUPTHER

## 2019-07-22 ENCOUNTER — TELEPHONE (OUTPATIENT)
Dept: NEUROLOGY | Age: 84
End: 2019-07-22

## 2019-07-22 NOTE — TELEPHONE ENCOUNTER
Patients daughter called requesting for  to write an order for pt to have home care while daughter is at work. Daughter is aware that pt may need an apptmt.  Please place on cancellation list

## 2019-07-23 NOTE — TELEPHONE ENCOUNTER
Put patient on cancellation and notified I will hold this until Dr. Judi Boland is back in the office

## 2019-07-29 DIAGNOSIS — Z86.73 HISTORY OF CVA (CEREBROVASCULAR ACCIDENT): ICD-10-CM

## 2019-07-29 DIAGNOSIS — G20 PARKINSON DISEASE (HCC): Primary | ICD-10-CM

## 2019-07-30 ENCOUNTER — HOME HEALTH ADMISSION (OUTPATIENT)
Dept: HOME HEALTH SERVICES | Facility: HOME HEALTH | Age: 84
End: 2019-07-30
Payer: MEDICARE

## 2019-07-31 ENCOUNTER — HOME CARE VISIT (OUTPATIENT)
Dept: SCHEDULING | Facility: HOME HEALTH | Age: 84
End: 2019-07-31
Payer: MEDICARE

## 2019-07-31 PROCEDURE — 400013 HH SOC

## 2019-07-31 PROCEDURE — G0151 HHCP-SERV OF PT,EA 15 MIN: HCPCS

## 2019-08-01 VITALS
TEMPERATURE: 98.5 F | SYSTOLIC BLOOD PRESSURE: 105 MMHG | OXYGEN SATURATION: 97 % | BODY MASS INDEX: 21.33 KG/M2 | HEIGHT: 69 IN | DIASTOLIC BLOOD PRESSURE: 58 MMHG | WEIGHT: 144 LBS | RESPIRATION RATE: 16 BRPM | HEART RATE: 67 BPM

## 2019-08-06 ENCOUNTER — HOME CARE VISIT (OUTPATIENT)
Dept: SCHEDULING | Facility: HOME HEALTH | Age: 84
End: 2019-08-06
Payer: MEDICARE

## 2019-08-06 PROCEDURE — G0151 HHCP-SERV OF PT,EA 15 MIN: HCPCS

## 2019-08-07 VITALS
RESPIRATION RATE: 16 BRPM | DIASTOLIC BLOOD PRESSURE: 75 MMHG | OXYGEN SATURATION: 98 % | HEART RATE: 86 BPM | TEMPERATURE: 97.9 F | SYSTOLIC BLOOD PRESSURE: 132 MMHG

## 2019-08-08 ENCOUNTER — HOME CARE VISIT (OUTPATIENT)
Dept: SCHEDULING | Facility: HOME HEALTH | Age: 84
End: 2019-08-08
Payer: MEDICARE

## 2019-08-08 PROCEDURE — G0151 HHCP-SERV OF PT,EA 15 MIN: HCPCS

## 2019-08-09 VITALS
HEART RATE: 80 BPM | SYSTOLIC BLOOD PRESSURE: 102 MMHG | TEMPERATURE: 97.9 F | RESPIRATION RATE: 16 BRPM | DIASTOLIC BLOOD PRESSURE: 60 MMHG | OXYGEN SATURATION: 98 %

## 2019-08-13 ENCOUNTER — HOME CARE VISIT (OUTPATIENT)
Dept: SCHEDULING | Facility: HOME HEALTH | Age: 84
End: 2019-08-13
Payer: MEDICARE

## 2019-08-13 PROCEDURE — G0151 HHCP-SERV OF PT,EA 15 MIN: HCPCS

## 2019-08-14 VITALS
SYSTOLIC BLOOD PRESSURE: 137 MMHG | HEART RATE: 67 BPM | DIASTOLIC BLOOD PRESSURE: 68 MMHG | RESPIRATION RATE: 16 BRPM | OXYGEN SATURATION: 98 % | TEMPERATURE: 98.6 F

## 2019-08-15 ENCOUNTER — HOME CARE VISIT (OUTPATIENT)
Dept: SCHEDULING | Facility: HOME HEALTH | Age: 84
End: 2019-08-15
Payer: MEDICARE

## 2019-08-15 PROCEDURE — G0151 HHCP-SERV OF PT,EA 15 MIN: HCPCS

## 2019-08-16 VITALS
HEART RATE: 103 BPM | OXYGEN SATURATION: 98 % | RESPIRATION RATE: 16 BRPM | SYSTOLIC BLOOD PRESSURE: 150 MMHG | TEMPERATURE: 97.8 F | DIASTOLIC BLOOD PRESSURE: 78 MMHG

## 2019-08-20 ENCOUNTER — HOME CARE VISIT (OUTPATIENT)
Dept: SCHEDULING | Facility: HOME HEALTH | Age: 84
End: 2019-08-20
Payer: MEDICARE

## 2019-08-20 PROCEDURE — G0151 HHCP-SERV OF PT,EA 15 MIN: HCPCS

## 2019-08-21 VITALS
TEMPERATURE: 98.5 F | OXYGEN SATURATION: 98 % | DIASTOLIC BLOOD PRESSURE: 78 MMHG | RESPIRATION RATE: 16 BRPM | SYSTOLIC BLOOD PRESSURE: 143 MMHG | HEART RATE: 85 BPM

## 2019-08-22 ENCOUNTER — HOME CARE VISIT (OUTPATIENT)
Dept: SCHEDULING | Facility: HOME HEALTH | Age: 84
End: 2019-08-22
Payer: MEDICARE

## 2019-08-22 PROCEDURE — G0151 HHCP-SERV OF PT,EA 15 MIN: HCPCS

## 2019-08-23 VITALS
TEMPERATURE: 98.1 F | HEART RATE: 90 BPM | SYSTOLIC BLOOD PRESSURE: 128 MMHG | DIASTOLIC BLOOD PRESSURE: 68 MMHG | OXYGEN SATURATION: 98 % | RESPIRATION RATE: 18 BRPM

## 2019-08-27 ENCOUNTER — HOME CARE VISIT (OUTPATIENT)
Dept: SCHEDULING | Facility: HOME HEALTH | Age: 84
End: 2019-08-27
Payer: MEDICARE

## 2019-08-27 PROCEDURE — G0151 HHCP-SERV OF PT,EA 15 MIN: HCPCS

## 2019-08-28 VITALS
HEART RATE: 68 BPM | SYSTOLIC BLOOD PRESSURE: 102 MMHG | OXYGEN SATURATION: 98 % | TEMPERATURE: 98.6 F | DIASTOLIC BLOOD PRESSURE: 50 MMHG | RESPIRATION RATE: 16 BRPM

## 2019-08-29 ENCOUNTER — HOME CARE VISIT (OUTPATIENT)
Dept: SCHEDULING | Facility: HOME HEALTH | Age: 84
End: 2019-08-29
Payer: MEDICARE

## 2019-08-29 PROCEDURE — G0151 HHCP-SERV OF PT,EA 15 MIN: HCPCS

## 2019-08-30 VITALS
TEMPERATURE: 98.3 F | RESPIRATION RATE: 16 BRPM | OXYGEN SATURATION: 95 % | SYSTOLIC BLOOD PRESSURE: 129 MMHG | DIASTOLIC BLOOD PRESSURE: 82 MMHG | HEART RATE: 74 BPM

## 2019-09-03 ENCOUNTER — HOME CARE VISIT (OUTPATIENT)
Dept: SCHEDULING | Facility: HOME HEALTH | Age: 84
End: 2019-09-03
Payer: MEDICARE

## 2019-09-03 PROCEDURE — G0151 HHCP-SERV OF PT,EA 15 MIN: HCPCS

## 2019-09-04 VITALS
HEART RATE: 78 BPM | TEMPERATURE: 98.6 F | RESPIRATION RATE: 16 BRPM | SYSTOLIC BLOOD PRESSURE: 122 MMHG | DIASTOLIC BLOOD PRESSURE: 70 MMHG | OXYGEN SATURATION: 98 %

## 2019-09-05 ENCOUNTER — HOME CARE VISIT (OUTPATIENT)
Dept: SCHEDULING | Facility: HOME HEALTH | Age: 84
End: 2019-09-05
Payer: MEDICARE

## 2019-09-05 PROCEDURE — G0151 HHCP-SERV OF PT,EA 15 MIN: HCPCS

## 2019-09-06 VITALS
SYSTOLIC BLOOD PRESSURE: 128 MMHG | HEART RATE: 82 BPM | RESPIRATION RATE: 16 BRPM | TEMPERATURE: 98.6 F | DIASTOLIC BLOOD PRESSURE: 65 MMHG | OXYGEN SATURATION: 97 %

## 2019-09-10 ENCOUNTER — HOME CARE VISIT (OUTPATIENT)
Dept: SCHEDULING | Facility: HOME HEALTH | Age: 84
End: 2019-09-10
Payer: MEDICARE

## 2019-09-10 PROCEDURE — G0151 HHCP-SERV OF PT,EA 15 MIN: HCPCS

## 2019-09-11 VITALS
TEMPERATURE: 98.5 F | HEART RATE: 87 BPM | SYSTOLIC BLOOD PRESSURE: 148 MMHG | RESPIRATION RATE: 16 BRPM | DIASTOLIC BLOOD PRESSURE: 78 MMHG | OXYGEN SATURATION: 98 %

## 2019-09-12 ENCOUNTER — HOME CARE VISIT (OUTPATIENT)
Dept: SCHEDULING | Facility: HOME HEALTH | Age: 84
End: 2019-09-12
Payer: MEDICARE

## 2019-09-12 PROCEDURE — G0151 HHCP-SERV OF PT,EA 15 MIN: HCPCS

## 2019-09-13 VITALS
SYSTOLIC BLOOD PRESSURE: 142 MMHG | RESPIRATION RATE: 16 BRPM | TEMPERATURE: 98.5 F | DIASTOLIC BLOOD PRESSURE: 80 MMHG | OXYGEN SATURATION: 98 % | HEART RATE: 80 BPM

## 2019-09-25 ENCOUNTER — OFFICE VISIT (OUTPATIENT)
Dept: INTERNAL MEDICINE CLINIC | Age: 84
End: 2019-09-25

## 2019-09-25 VITALS
WEIGHT: 132 LBS | HEIGHT: 69 IN | SYSTOLIC BLOOD PRESSURE: 100 MMHG | OXYGEN SATURATION: 97 % | BODY MASS INDEX: 19.55 KG/M2 | HEART RATE: 70 BPM | DIASTOLIC BLOOD PRESSURE: 54 MMHG | TEMPERATURE: 98.3 F | RESPIRATION RATE: 16 BRPM

## 2019-09-25 DIAGNOSIS — N31.8 DETRUSOR DYSFUNCTION: ICD-10-CM

## 2019-09-25 DIAGNOSIS — Z13.39 SCREENING FOR ALCOHOLISM: ICD-10-CM

## 2019-09-25 DIAGNOSIS — G20 PARKINSON DISEASE (HCC): ICD-10-CM

## 2019-09-25 DIAGNOSIS — E78.00 ELEVATED CHOLESTEROL: ICD-10-CM

## 2019-09-25 DIAGNOSIS — G40.909 SEIZURE DISORDER (HCC): ICD-10-CM

## 2019-09-25 DIAGNOSIS — Z00.00 MEDICARE ANNUAL WELLNESS VISIT, SUBSEQUENT: Primary | ICD-10-CM

## 2019-09-25 DIAGNOSIS — Z13.31 SCREENING FOR DEPRESSION: ICD-10-CM

## 2019-09-25 DIAGNOSIS — Z79.899 ON STATIN THERAPY: ICD-10-CM

## 2019-09-25 DIAGNOSIS — C61 PROSTATE CANCER (HCC): ICD-10-CM

## 2019-09-25 DIAGNOSIS — I67.9 CEREBROVASCULAR DISEASE: ICD-10-CM

## 2019-09-25 DIAGNOSIS — R53.83 FATIGUE, UNSPECIFIED TYPE: ICD-10-CM

## 2019-09-25 DIAGNOSIS — I10 ESSENTIAL HYPERTENSION: Chronic | ICD-10-CM

## 2019-09-25 LAB
A-G RATIO,AGRAT: 1.3 RATIO
ALBUMIN SERPL-MCNC: 4 G/DL (ref 3.9–5.4)
ALP SERPL-CCNC: 82 U/L (ref 38–126)
ALT SERPL-CCNC: 12 U/L (ref 0–50)
ANION GAP SERPL CALC-SCNC: 9 MMOL/L
AST SERPL W P-5'-P-CCNC: 59 U/L (ref 14–36)
BILIRUB SERPL-MCNC: 0.3 MG/DL (ref 0.2–1.3)
BUN SERPL-MCNC: 23 MG/DL (ref 9–20)
BUN/CREATININE RATIO,BUCR: 19 RATIO
CALCIUM SERPL-MCNC: 9.6 MG/DL (ref 8.4–10.2)
CHLORIDE SERPL-SCNC: 108 MMOL/L (ref 98–107)
CHOL/HDL RATIO,CHHD: 2 RATIO (ref 0–4)
CHOLEST SERPL-MCNC: 136 MG/DL (ref 0–200)
CK SERPL-CCNC: 152 U/L (ref 30–135)
CO2 SERPL-SCNC: 26 MMOL/L (ref 22–32)
CREAT SERPL-MCNC: 1.2 MG/DL (ref 0.8–1.5)
GLOBULIN,GLOB: 3
GLUCOSE SERPL-MCNC: 97 MG/DL (ref 75–110)
HDLC SERPL-MCNC: 73 MG/DL (ref 35–130)
LDL/HDL RATIO,LDHD: 1 RATIO
LDLC SERPL CALC-MCNC: 53 MG/DL (ref 0–130)
POTASSIUM SERPL-SCNC: 4.3 MMOL/L (ref 3.6–5)
PROT SERPL-MCNC: 7 G/DL (ref 6.3–8.2)
SODIUM SERPL-SCNC: 143 MMOL/L (ref 137–145)
TRIGL SERPL-MCNC: 49 MG/DL (ref 0–200)
VLDLC SERPL CALC-MCNC: 10 MG/DL

## 2019-09-25 NOTE — PROGRESS NOTES
This note will not be viewable in [address] William Tello Sr. is a 80 y.o. male and presents with Hypertension (follow up); Cholesterol Problem (follow up); Parkinsons Disease (follow up); and Prostate Cancer (follow up)  . Subjective:  Mr. Andrey Wiggins presents today for follow-up accompanied by his daughter. Problems include hypertension, hyperlipidemia, Parkinson's disease, history of prostate cancer. 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 He does note some pain in his legs especially at nighttime that can awaken him from his sleep. He has not noted whether the pain goes away if he sits up. He has had arterial Dopplers done with vascular surgery and they did note decreased blood flow in his distal extremities. However he was told it was not severe enough to need surgery. He does remain on Aggrenox daily. He is recently had some physical therapy for his Parkinson's disease and he is limited as far as ambulation but can walk with the assistance of a Rollator walker. He has no shortness of breath, chest pain, palpitations, PND, orthopnea, or pedal edema. He does have a history of seizure disorder as well as a history of CVA. He has follow-up with neurology. Review of Systems  Constitutional:   Eyes:   negative for visual disturbance and irritation  ENT:   negative for tinnitus,sore throat,nasal congestion,ear pains. hoarseness  Respiratory:  negative for cough, hemoptysis, dyspnea,wheezing  CV:   negative for chest pain, palpitations, lower extremity edema  GI:   negative for nausea, vomiting, diarrhea, abdominal pain,melena  Endo:               negative for polyuria,polydipsia,polyphagia,heat intolerance  Genitourinary: negative for frequency, dysuria and hematuria  Integumentary: negative for rash and pruritus  Hematologic:  negative for easy bruising and gum/nose bleeding  Musculoskel: negative for myalgias, arthralgias, back pain, muscle weakness, joint pain  Neurological:  negative for headaches, dizziness, vertigo, memory problems and gait   Behavl/Psych: negative for feelings of anxiety, depression, mood changes    Past Medical History:   Diagnosis Date    Arthritis of ankle 8/16/2017    Cancer [facility])     Prostate. 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    Edema 8/16/2017    Elevated cholesterol 8/16/2017    Fatigue 8/16/2017    Gastrointestinal disorder     ulcer    Hypertension     On statin therapy 8/16/2017    Parkinsons disease ([facility] 8/16/2017    Pernicious anemia 8/16/2017    Prostate cancer ([facility] 8/16/2017    Raynaud disease 8/16/2017    Seizure disorder ([facility] 8/16/2017    Tobacco abuse 8/16/2017    Weight loss 8/16/2017     Past Surgical History:   Procedure Laterality Date    ABDOMEN SURGERY PROC UNLISTED      Ulcer.  HX PROSTATECTOMY       Social History     Socioeconomic History    Marital status: SINGLE     Spouse name: Not on file    Number of children: Not on file    Years of education: Not on file    Highest education level: Not on file   Tobacco Use    Smoking status: Current Every Day Smoker     Packs/day: 0.50    Smokeless tobacco: Never Used   Substance and Sexual Activity    Alcohol use: No    Drug use: No    Sexual activity: Not Currently     Family History   Problem Relation Age of Onset    Dementia Mother     Stroke Father     Heart Disease Sister     Hypertension Sister     Hypertension Brother     Arthritis-osteo Brother     Heart Disease Brother     COPD Brother     Hypertension Child      Current Outpatient Medications   Medication Sig Dispense Refill    amLODIPine (NORVASC) 5 mg tablet TAKE 1 TABLET BY MOUTH EVERY DAY 30 Tab 6    carbidopa-levodopa (SINEMET)  mg per tablet TAKE 2 TABS BY MOUTH THREE (3) TIMES DAILY. 540 Tab 2    zonisamide (ZONEGRAN) 100 mg capsule TAKE 1 CAP BY MOUTH DAILY.  90 Cap 3    levETIRAcetam (KEPPRA) 500 mg tablet TAKE 1 TABLET BY MOUTH EVERY MORNING AND 2 AT BEDTIME 270 Tab 4    multivit with minerals/lutein (MULTIVITAMIN 50 PLUS PO) Take 1 Tab by mouth daily.  tamsulosin (FLOMAX) 0.4 mg capsule Take 1 Cap by mouth daily. 90 Cap 3    rasagiline (AZILECT) 1 mg tablet TAKE 1 TABLET BY MOUTH DAILY 90 Tab 5    pravastatin (PRAVACHOL) 40 mg tablet Take 1 Tab by mouth daily. 90 Tab 3    aspirin-dipyridamole (AGGRENOX)  mg per SR capsule TAKE ONE CAPSULE BY MOUTH TWICE A  Cap 4    mirabegron ER (MYRBETRIQ) 25 mg ER tablet Take 1 Tab by mouth daily. 90 Tab 3     Allergies   Allergen Reactions    Pcn [Penicillins] Swelling       Objective:  Visit Vitals  /54 (BP 1 Location: Right arm, BP Patient Position: Sitting)   Pulse 70   Temp 98.3 °F (36.8 °C) (Oral)   Resp 16   Ht 5' 9\" (1.753 m)   Wt 132 lb (59.9 kg)   SpO2 97%   BMI 19.49 kg/m²     Physical Exam:   General appearance -frail and disheveled, and in no distress  Mental status - alert, oriented to person, place, and time  EYE-YARY, EOMI, fundi normal, corneas normal, no foreign bodies  ENT-ENT exam normal, no neck nodes or sinus tenderness  Nose - normal and patent, no erythema, discharge or polyps  Mouth - mucous membranes moist, pharynx normal without lesions  Neck - supple, no significant adenopathy   Chest - clear to auscultation, no wheezes, rales or rhonchi, symmetric air entry   Heart - normal rate, regular rhythm, normal S1, S2, no murmurs, rubs, clicks or gallops   Abdomen - soft, nontender, nondistended, no masses or organomegaly  Lymph- no adenopathy palpable  Ext-peripheral pulses normal, no pedal edema, no clubbing or cyanosis  Skin-Warm and dry. no hyperpigmentation, vitiligo, or suspicious lesions  Neuro -alert, oriented, dysarthric speech, rigidity, unsteady gait  Musculoskeletal- FROM, no bony abnormalities, no point tenderness    No results found for this visit on 09/25/19.   All results for lab orders may not have been returned by the time this encountered was closed. Assessment/Plan:       ICD-10-CM ICD-9-CM    1. Essential hypertension I45 343.5 METABOLIC PANEL, COMPREHENSIVE      URINALYSIS W/O MICRO   2. Cerebrovascular disease I67.9 437.9    3. Parkinson disease ([facility] G20 332.0    4. Seizure disorder ([facility] G40.909 345.90 LEVETIRACETAM (KEPPRA)   5. Prostate cancer ([facility] C61 185    6. Detrusor dysfunction N31.8 596.59    7. Elevated cholesterol E78.00 272.0 LIPID PANEL   8. On statin therapy Z79.899 V58.69 CK   9. Fatigue, unspecified type R53.83 780.79 CBC WITH AUTOMATED DIFF       Orders Placed This Encounter    CBC WITH AUTOMATED DIFF    METABOLIC PANEL, COMPREHENSIVE ([facility])    LIPID PANEL ([facility])    CK ([facility])    URINALYSIS W/O MICRO ([facility])    LEVETIRACETAM (KEPPRA)         Plan:    Continue current medical regimen. Further recommendations based on labs as ordered. The patient did have physical therapy recently which is helped with his mobility. He would benefit from additional assistance at home for assistance with ADLs including dressing and bathing. I have reviewed with the patient details of the assessment and plan and all questions were answered. Relevent patient education was performed. Verbal and/or written instructions (see AVS) provided. The most recent lab findings were reviewed with the patient. Plan was discussed with patient who verbal expressed understanding. An After Visit Summary was printed and given to the patient. Wanda Hodgson MD    This is the Subsequent Medicare Annual Wellness Exam, performed 12 months or more after the Initial AWV or the last Subsequent AWV    I have reviewed the patient's medical history in detail and updated the computerized patient record. History     Past Medical History:   Diagnosis Date    Arthritis of ankle 8/16/2017    Cancer [facility])     Prostate.    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    Edema 8/16/2017    Elevated cholesterol 8/16/2017    Fatigue 8/16/2017    Gastrointestinal disorder     ulcer    Hypertension     On statin therapy 8/16/2017    Parkinsons disease ([facility] 8/16/2017    Pernicious anemia 8/16/2017    Prostate cancer ([facility] 8/16/2017    Raynaud disease 8/16/2017    Seizure disorder ([facility] 8/16/2017    Tobacco abuse 8/16/2017    Weight loss 8/16/2017      Past Surgical History:   Procedure Laterality Date    ABDOMEN SURGERY PROC UNLISTED      Ulcer.  HX PROSTATECTOMY       Current Outpatient Medications   Medication Sig Dispense Refill    amLODIPine (NORVASC) 5 mg tablet TAKE 1 TABLET BY MOUTH EVERY DAY 30 Tab 6    carbidopa-levodopa (SINEMET)  mg per tablet TAKE 2 TABS BY MOUTH THREE (3) TIMES DAILY. 540 Tab 2    zonisamide (ZONEGRAN) 100 mg capsule TAKE 1 CAP BY MOUTH DAILY. 90 Cap 3    levETIRAcetam (KEPPRA) 500 mg tablet TAKE 1 TABLET BY MOUTH EVERY MORNING AND 2 AT BEDTIME 270 Tab 4    multivit with minerals/lutein (MULTIVITAMIN 50 PLUS PO) Take 1 Tab by mouth daily.  tamsulosin (FLOMAX) 0.4 mg capsule Take 1 Cap by mouth daily. 90 Cap 3    rasagiline (AZILECT) 1 mg tablet TAKE 1 TABLET BY MOUTH DAILY 90 Tab 5    pravastatin (PRAVACHOL) 40 mg tablet Take 1 Tab by mouth daily. 90 Tab 3    aspirin-dipyridamole (AGGRENOX)  mg per SR capsule TAKE ONE CAPSULE BY MOUTH TWICE A  Cap 4    mirabegron ER (MYRBETRIQ) 25 mg ER tablet Take 1 Tab by mouth daily.  80 Tab 3     Allergies   Allergen Reactions    Pcn [Penicillins] Swelling     Family History   Problem Relation Age of Onset    Dementia Mother     Stroke Father     Heart Disease Sister     Hypertension Sister     Hypertension Brother     Arthritis-osteo Brother     Heart Disease Brother     COPD Brother     Hypertension Child      Social History     Tobacco Use    Smoking status: Current Every Day Smoker     Packs/day: 0.50    Smokeless tobacco: Never Used   Substance Use Topics    Alcohol use: No     Patient Active Problem List   Diagnosis Code    History of CVA (cerebrovascular accident) Z80.78    Prostate hypertrophy N40.0    Parkinson disease ([facility] G20    B12 deficiency E53.8    Hyperhomocysteinemia ([facility] E72.11    Late effect of stroke I69.30    Ataxia R27.0    Neurogenic bladder N31.9    Anemia, unspecified D64.9    Memory loss R41.3    Syncope R55    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, F02.80    Stenosis of both carotid arteries without cerebral infarction I65.23    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    Cellulitis L03.90    Cerebrovascular disease I67.9    Detrusor dysfunction N31.8    Edema R60.9    Elevated cholesterol E78.00    Fatigue R53.83    On statin therapy Z79.899    Parkinsons disease (HCC) G20    Pernicious anemia D51.0    Prostate cancer ([facility] C61    Raynaud disease I73.00    Seizure disorder ([facility] G40.909    Tobacco abuse Z72.0    Weight loss R63.4    Essential hypertension I10    Thrombotic stroke involving left middle cerebral artery ([facility] I63.312    Complex partial seizure evolving to generalized seizure ([facility] G40.209       Depression Risk Factor Screening:     3 most recent PHQ Screens 3/25/2019   PHQ Not Done -   Little interest or pleasure in doing things Not at all   Feeling down, depressed, irritable, or hopeless Not at all   Total Score PHQ 2 0     Alcohol Risk Factor Screening: You do not drink alcohol or very rarely. Functional Ability and Level of Safety:   Hearing Loss  Hearing is good. Activities of Daily Living  The home contains: Rollator walker  Needs total assist with ADLs. Fall Risk  Fall Risk Assessment, last 12 mths 3/25/2019   Able to walk? Yes   Fall in past 12 months? Yes   Fall with injury?  No   Number of falls in past 12 months 2   Fall Risk Score 2       Abuse Screen  Patient is not abused    Cognitive Screening   Evaluation of Cognitive Function:  Has your family/caregiver stated any concerns about your memory: no  Abnormal    Patient Care Team   Patient Care Team:  Darwin Moss MD as PCP - General (Internal Medicine)  Leland Lopez MD (Vascular Surgery)    Assessment/Plan   Education and counseling provided:  Are appropriate based on today's review and evaluation    Diagnoses and all orders for this visit:    1. Medicare annual wellness visit, subsequent    2. Essential hypertension  -     METABOLIC PANEL, COMPREHENSIVE  -     URINALYSIS W/O MICRO    3. Cerebrovascular disease    4. Parkinson disease ([facility]    5. Seizure disorder (HCC)  -     LEVETIRACETAM (KEPPRA)    6. Prostate cancer ([facility]    7. Detrusor dysfunction    8. Elevated cholesterol  -     LIPID PANEL    9. On statin therapy  -     CK    10. Fatigue, unspecified type  -     CBC WITH AUTOMATED DIFF    11. Screening for alcoholism  -     OK ANNUAL ALCOHOL SCREEN 15 MIN    12.  Screening for depression  -     [place] Maintenance Due   Topic Date Due    DTaP/Tdap/Td series (1 - Tdap) 05/03/1955    Shingrix Vaccine Age 50> (1 of 2) 05/03/1984    GLAUCOMA SCREENING Q2Y  05/03/1999    Influenza Age 9 to Adult  08/01/2019    MEDICARE YEARLY EXAM  09/22/2019

## 2019-09-25 NOTE — PROGRESS NOTES
Taylor Amanda presents today at the clinic for    Chief Complaint   Patient presents with    Hypertension     follow up    Cholesterol Problem     follow up    Parkinsons Disease     follow up    Prostate Cancer     follow up        Wt Readings from Last 3 Encounters:   09/25/19 132 lb (59.9 kg)   07/31/19 144 lb (65.3 kg)   03/25/19 135 lb (61.2 kg)     Temp Readings from Last 3 Encounters:   09/25/19 98.3 °F (36.8 °C) (Oral)   09/12/19 98.5 °F (36.9 °C) (Temporal)   09/10/19 98.5 °F (36.9 °C) (Temporal)     BP Readings from Last 3 Encounters:   09/25/19 100/54   09/12/19 142/80   09/10/19 148/78     Pulse Readings from Last 3 Encounters:   09/25/19 70   09/12/19 80   09/10/19 87       Health Maintenance Due   Topic    DTaP/Tdap/Td series (1 - Tdap)    Shingrix Vaccine Age 50> (1 of 2)    GLAUCOMA SCREENING Q2Y     Influenza Age 5 to Adult     MEDICARE YEARLY EXAM          Learning Assessment:  :     Learning Assessment 3/22/2018 9/6/2017 4/19/2016   PRIMARY LEARNER Patient Patient Patient   HIGHEST LEVEL OF EDUCATION - PRIMARY LEARNER  - DID NOT GRADUATE HIGH SCHOOL -   BARRIERS PRIMARY LEARNER - NONE -   CO-LEARNER CAREGIVER - No -   PRIMARY LANGUAGE ENGLISH ENGLISH ENGLISH   LEARNER PREFERENCE PRIMARY DEMONSTRATION READING DEMONSTRATION   ANSWERED BY patient patient patient   RELATIONSHIP SELF SELF SELF       Depression Screening:  :     3 most recent PHQ Screens 3/25/2019   PHQ Not Done -   Little interest or pleasure in doing things Not at all   Feeling down, depressed, irritable, or hopeless Not at all   Total Score PHQ 2 0       Fall Risk Assessment:  :     Fall Risk Assessment, last 12 mths 3/25/2019   Able to walk? Yes   Fall in past 12 months? Yes   Fall with injury? No   Number of falls in past 12 months 2   Fall Risk Score 2       Abuse Screening:  :     Abuse Screening Questionnaire 3/25/2019   Do you ever feel afraid of your partner?  N   Are you in a relationship with someone who physically or mentally threatens you? N   Is it safe for you to go home? Y       Coordination of Care Questionnaire:  :     1. Have you been to the ER, urgent care clinic since your last visit? Hospitalized since your last visit? no    2. Have you seen or consulted any other health care providers outside of the [address] [place] [address] since your last visit? Include any pap smears or colon screening. Patient states he saw Dr Hopper earlier today.

## 2019-09-25 NOTE — PATIENT INSTRUCTIONS
Medicare Wellness Visit, Male The best way to live healthy is to have a lifestyle where you eat a well-balanced diet, exercise regularly, limit alcohol use, and quit all forms of tobacco/nicotine, if applicable. Regular preventive services are another way to keep healthy. Preventive services (vaccines, screening tests, monitoring & exams) can help personalize your care plan, which helps you manage your own care. Screening tests can find health problems at the earliest stages, when they are easiest to treat. [facility] follows the current, evidence-based guidelines published by the [facility] Anay[facility] ([facility]) when recommending preventive services for our patients. Because we follow these guidelines, sometimes recommendations change over time as research supports it. (For example, a prostate screening blood test is no longer routinely recommended for men with no symptoms.) Of course, you and your doctor may decide to screen more often for some diseases, based on your risk and co-morbidities (chronic disease you are already diagnosed with). Preventive services for you include: - Medicare offers their members a free annual wellness visit, which is time for you and your primary care provider to discuss and plan for your preventive service needs. Take advantage of this benefit every year! 
-All adults over age 72 should receive the recommended pneumonia vaccines. Current USPSTF guidelines recommend a series of two vaccines for the best pneumonia protection.  
-All adults should have a flu vaccine yearly and an ECG.  All adults age 61 and older should receive a shingles vaccine once in their lifetime.   
-All adults age 38-68 who are overweight should have a diabetes screening test once every three years.  
-Other screening tests & preventive services for persons with diabetes include: an eye exam to screen for diabetic retinopathy, a kidney function test, a foot exam, and stricter control over your cholesterol.  
-Cardiovascular screening for adults with routine risk involves an electrocardiogram (ECG) at intervals determined by the provider.  
-Colorectal cancer screening should be done for adults age 54-65 with no increased risk factors for colorectal cancer. There are a number of acceptable methods of screening for this type of cancer. Each test has its own benefits and drawbacks. Discuss with your provider what is most appropriate for you during your annual wellness visit. The different tests include: colonoscopy (considered the best screening method), a fecal occult blood test, a fecal DNA test, and sigmoidoscopy. 
-All adults born between [facility] should be screened once for Hepatitis C. 
-An Abdominal Aortic Aneurysm (AAA) Screening is recommended for men age 73-68 who has ever smoked in their lifetime. Here is a list of your current Health Maintenance items (your personalized list of preventive services) with a due date: 
Health Maintenance Due Topic Date Due  
 DTaP/Tdap/Td  (1 - Tdap) 05/03/1955  Shingles Vaccine (1 of 2) 05/03/1984  Glaucoma Screening   05/03/1999  Flu Vaccine  08/01/2019 [address] [place] [address] Annual Well Visit  09/22/2019

## 2019-09-27 LAB
BASOPHILS # BLD AUTO: 0 X10E3/UL (ref 0–0.2)
BASOPHILS NFR BLD AUTO: 0 %
EOSINOPHIL # BLD AUTO: 0.1 X10E3/UL (ref 0–0.4)
EOSINOPHIL NFR BLD AUTO: 1 %
ERYTHROCYTE [DISTWIDTH] IN BLOOD BY AUTOMATED COUNT: 12.6 % (ref 12.3–15.4)
HCT VFR BLD AUTO: 34.3 % (ref 37.5–51)
HGB BLD-MCNC: 11.2 G/DL (ref 13–17.7)
IMM GRANULOCYTES # BLD AUTO: 0 X10E3/UL (ref 0–0.1)
IMM GRANULOCYTES NFR BLD AUTO: 0 %
LEVETIRACETAM SERPL-MCNC: 41.3 UG/ML (ref 10–40)
LYMPHOCYTES # BLD AUTO: 1.3 X10E3/UL (ref 0.7–3.1)
LYMPHOCYTES NFR BLD AUTO: 17 %
MCH RBC QN AUTO: 31.7 PG (ref 26.6–33)
MCHC RBC AUTO-ENTMCNC: 32.7 G/DL (ref 31.5–35.7)
MCV RBC AUTO: 97 FL (ref 79–97)
MONOCYTES # BLD AUTO: 0.6 X10E3/UL (ref 0.1–0.9)
MONOCYTES NFR BLD AUTO: 8 %
NEUTROPHILS # BLD AUTO: 5.9 X10E3/UL (ref 1.4–7)
NEUTROPHILS NFR BLD AUTO: 74 %
PLATELET # BLD AUTO: 154 X10E3/UL (ref 150–450)
RBC # BLD AUTO: 3.53 X10E6/UL (ref 4.14–5.8)
WBC # BLD AUTO: 7.9 X10E3/UL (ref 3.4–10.8)

## 2019-10-29 RX ORDER — MIRABEGRON 25 MG/1
TABLET, FILM COATED, EXTENDED RELEASE ORAL
Qty: 90 TAB | Refills: 3 | Status: SHIPPED | OUTPATIENT
Start: 2019-10-29 | End: 2020-11-02

## 2019-11-10 DIAGNOSIS — R56.9 CONVULSIONS, UNSPECIFIED CONVULSION TYPE (HCC): ICD-10-CM

## 2019-11-10 DIAGNOSIS — G40.209 COMPLEX PARTIAL SEIZURE EVOLVING TO GENERALIZED SEIZURE (HCC): ICD-10-CM

## 2019-11-10 DIAGNOSIS — Z86.73 HISTORY OF CVA (CEREBROVASCULAR ACCIDENT): ICD-10-CM

## 2019-11-10 DIAGNOSIS — F02.80 DEMENTIA OF THE ALZHEIMER'S TYPE WITH LATE ONSET WITHOUT BEHAVIORAL DISTURBANCE (HCC): ICD-10-CM

## 2019-11-10 DIAGNOSIS — I65.23 ASYMPTOMATIC CAROTID ARTERY STENOSIS, BILATERAL: ICD-10-CM

## 2019-11-10 DIAGNOSIS — R55 VASOVAGAL SYNCOPE: ICD-10-CM

## 2019-11-10 DIAGNOSIS — G30.1 DEMENTIA OF THE ALZHEIMER'S TYPE WITH LATE ONSET WITHOUT BEHAVIORAL DISTURBANCE (HCC): ICD-10-CM

## 2019-11-10 DIAGNOSIS — R41.3 MEMORY LOSS: ICD-10-CM

## 2019-11-10 DIAGNOSIS — I63.312 THROMBOTIC STROKE INVOLVING LEFT MIDDLE CEREBRAL ARTERY (HCC): ICD-10-CM

## 2019-11-10 DIAGNOSIS — I65.23 STENOSIS OF BOTH CAROTID ARTERIES WITHOUT CEREBRAL INFARCTION: ICD-10-CM

## 2019-11-10 DIAGNOSIS — E53.8 B12 DEFICIENCY: ICD-10-CM

## 2019-11-10 DIAGNOSIS — R27.0 ATAXIA: ICD-10-CM

## 2019-11-10 DIAGNOSIS — G20 PARKINSON DISEASE (HCC): ICD-10-CM

## 2019-11-10 DIAGNOSIS — I69.30 LATE EFFECT OF STROKE: ICD-10-CM

## 2019-11-10 RX ORDER — LEVETIRACETAM 500 MG/1
TABLET ORAL
Qty: 810 TAB | Refills: 1 | Status: SHIPPED | OUTPATIENT
Start: 2019-11-10 | End: 2020-12-16 | Stop reason: SDUPTHER

## 2019-12-11 ENCOUNTER — CLINICAL SUPPORT (OUTPATIENT)
Dept: INTERNAL MEDICINE CLINIC | Age: 84
End: 2019-12-11

## 2019-12-11 VITALS
HEART RATE: 76 BPM | OXYGEN SATURATION: 97 % | RESPIRATION RATE: 16 BRPM | SYSTOLIC BLOOD PRESSURE: 134 MMHG | WEIGHT: 132 LBS | TEMPERATURE: 98.2 F | DIASTOLIC BLOOD PRESSURE: 74 MMHG | BODY MASS INDEX: 19.55 KG/M2 | HEIGHT: 69 IN

## 2019-12-11 DIAGNOSIS — Z23 ENCOUNTER FOR IMMUNIZATION: ICD-10-CM

## 2019-12-11 NOTE — PATIENT INSTRUCTIONS
Vaccine Information Statement    Influenza (Flu) Vaccine (Inactivated or Recombinant): What You Need to Know    Many Vaccine Information Statements are available in Yoruba and other languages. See www.immunize.org/vis  Hojas de información sobre vacunas están disponibles en español y en muchos otros idiomas. Visite www.immunize.org/vis    1. Why get vaccinated? Influenza vaccine can prevent influenza (flu). Flu is a contagious disease that spreads around the [facility] every year, usually between October and May. Anyone can get the flu, but it is more dangerous for some people. Infants and young children, people 72years of age and older, pregnant women, and people with certain health conditions or a weakened immune system are at greatest risk of flu complications. Pneumonia, bronchitis, sinus infections and ear infections are examples of flu-related complications. If you have a medical condition, such as heart disease, cancer or diabetes, flu can make it worse. Flu can cause fever and chills, sore throat, muscle aches, fatigue, cough, headache, and runny or stuffy nose. Some people may have vomiting and diarrhea, though this is more common in children than adults. Each year thousands of people in the [facility] die from flu, and many more are hospitalized. Flu vaccine prevents millions of illnesses and flu-related visits to the doctor each year. 2. Influenza vaccines     CDC recommends everyone 10months of age and older get vaccinated every flu season. Children 6 months through 6years of age may need 2 doses during a single flu season. Everyone else needs only 1 dose each flu season. It takes about 2 weeks for protection to develop after vaccination. There are many flu viruses, and they are always changing. Each year a new flu vaccine is made to protect against three or four viruses that are likely to cause disease in the upcoming flu season.  Even when the vaccine doesnt exactly match these viruses, it may still provide some protection. Influenza vaccine does not cause flu. Influenza vaccine may be given at the same time as other vaccines. 3. Talk with your health care provider    Tell your vaccine provider if the person getting the vaccine:   Has had an allergic reaction after a previous dose of influenza vaccine, or has any severe, life-threatening allergies.  Has ever had Guillain-Barré Syndrome (also called GBS). In some cases, your health care provider may decide to postpone influenza vaccination to a future visit. People with minor illnesses, such as a cold, may be vaccinated. People who are moderately or severely ill should usually wait until they recover before getting influenza vaccine. Your health care provider can give you more information. 4. Risks of a reaction     Soreness, redness, and swelling where shot is given, fever, muscle aches, and headache can happen after influenza vaccine.  There may be a very small increased risk of Guillain-Barré Syndrome (GBS) after inactivated influenza vaccine (the flu shot). [place] children who get the flu shot along with pneumococcal vaccine (PCV13), and/or DTaP vaccine at the same time might be slightly more likely to have a seizure caused by fever. Tell your health care provider if a child who is getting flu vaccine has ever had a seizure. People sometimes faint after medical procedures, including vaccination. Tell your provider if you feel dizzy or have vision changes or ringing in the ears. As with any medicine, there is a very remote chance of a vaccine causing a severe allergic reaction, other serious injury, or death. 5. What if there is a serious problem? An allergic reaction could occur after the vaccinated person leaves the clinic.  If you see signs of a severe allergic reaction (hives, swelling of the face and throat, difficulty breathing, a fast heartbeat, dizziness, or weakness), call 9-1-1 and get the person to the nearest hospital.    For other signs that concern you, call your health care provider. Adverse reactions should be reported to the Vaccine Adverse Event Reporting System (VAERS). Your health care provider will usually file this report, or you can do it yourself. Visit the VAERS website at www.vaers. [facility].gov or call 7-768.385.1991. VAERS is only for reporting reactions, and VAERS staff do not give medical advice. 6. The National Vaccine Injury Compensation Program    The [facility] Vaccine Injury Compensation Program (VICP) is a federal program that was created to compensate people who may have been injured by certain vaccines. Visit the VICP website at www.[facility].gov/vaccinecompensation or call 6-645.781.4185 to learn about the program and about filing a claim. There is a time limit to file a claim for compensation. 7. How can I learn more?  Ask your health care provider.  Call your local or state health department.  Contact the Centers for Disease Control and Prevention (CDC):  - Call 4-914.869.3094 (1-800-CDC-INFO) or  - Visit CDCs influenza website at www.cdc.gov/flu    Vaccine Information Statement (Interim)  Inactivated Influenza Vaccine   8/15/2019  42 DANILO Jose [id number]   Department of Health and Human Services  Centers for Disease Control and Prevention    Office Use Only

## 2019-12-11 NOTE — PROGRESS NOTES
After obtaining verbal consent and per orders of Dr. Kapil Duran, injection of flu shot given by Carolina Marie LPN. Order and injection/medication verified by second nurse/ma review by Quan Banerjee CMA. Patient tolerated procedure well.  No reactions noted

## 2019-12-12 RX ORDER — AMLODIPINE BESYLATE 5 MG/1
TABLET ORAL
Qty: 90 TAB | Refills: 2 | Status: SHIPPED | OUTPATIENT
Start: 2019-12-12 | End: 2020-10-22

## 2020-01-03 DIAGNOSIS — I65.23 ASYMPTOMATIC CAROTID ARTERY STENOSIS, BILATERAL: ICD-10-CM

## 2020-01-03 DIAGNOSIS — I69.30 LATE EFFECT OF STROKE: ICD-10-CM

## 2020-01-03 DIAGNOSIS — I65.23 STENOSIS OF BOTH CAROTID ARTERIES WITHOUT CEREBRAL INFARCTION: ICD-10-CM

## 2020-01-03 DIAGNOSIS — Z86.73 HISTORY OF CVA (CEREBROVASCULAR ACCIDENT): ICD-10-CM

## 2020-01-03 DIAGNOSIS — R55 VASOVAGAL SYNCOPE: ICD-10-CM

## 2020-01-03 DIAGNOSIS — F02.80 DEMENTIA OF THE ALZHEIMER'S TYPE WITH LATE ONSET WITHOUT BEHAVIORAL DISTURBANCE (HCC): ICD-10-CM

## 2020-01-03 DIAGNOSIS — R27.0 ATAXIA: ICD-10-CM

## 2020-01-03 DIAGNOSIS — R41.3 MEMORY LOSS: ICD-10-CM

## 2020-01-03 DIAGNOSIS — G20 PARKINSON DISEASE (HCC): ICD-10-CM

## 2020-01-03 DIAGNOSIS — G40.209 COMPLEX PARTIAL SEIZURE EVOLVING TO GENERALIZED SEIZURE (HCC): ICD-10-CM

## 2020-01-03 DIAGNOSIS — I63.312 THROMBOTIC STROKE INVOLVING LEFT MIDDLE CEREBRAL ARTERY (HCC): ICD-10-CM

## 2020-01-03 DIAGNOSIS — G30.1 DEMENTIA OF THE ALZHEIMER'S TYPE WITH LATE ONSET WITHOUT BEHAVIORAL DISTURBANCE (HCC): ICD-10-CM

## 2020-01-03 DIAGNOSIS — E53.8 B12 DEFICIENCY: ICD-10-CM

## 2020-01-03 DIAGNOSIS — R56.9 CONVULSIONS, UNSPECIFIED CONVULSION TYPE (HCC): ICD-10-CM

## 2020-01-03 DIAGNOSIS — G20 PARKINSONS DISEASE (HCC): ICD-10-CM

## 2020-01-03 RX ORDER — ASPIRIN AND DIPYRIDAMOLE 25; 200 MG/1; MG/1
CAPSULE, EXTENDED RELEASE ORAL
Qty: 60 CAP | Refills: 14 | Status: SHIPPED | OUTPATIENT
Start: 2020-01-03 | End: 2021-02-07

## 2020-03-01 DIAGNOSIS — I65.23 ASYMPTOMATIC CAROTID ARTERY STENOSIS, BILATERAL: ICD-10-CM

## 2020-03-01 DIAGNOSIS — R55 VASOVAGAL SYNCOPE: ICD-10-CM

## 2020-03-01 DIAGNOSIS — F02.80 DEMENTIA OF THE ALZHEIMER'S TYPE WITH LATE ONSET WITHOUT BEHAVIORAL DISTURBANCE (HCC): ICD-10-CM

## 2020-03-01 DIAGNOSIS — G40.209 COMPLEX PARTIAL SEIZURE EVOLVING TO GENERALIZED SEIZURE (HCC): ICD-10-CM

## 2020-03-01 DIAGNOSIS — I63.312 THROMBOTIC STROKE INVOLVING LEFT MIDDLE CEREBRAL ARTERY (HCC): ICD-10-CM

## 2020-03-01 DIAGNOSIS — Z86.73 HISTORY OF CVA (CEREBROVASCULAR ACCIDENT): ICD-10-CM

## 2020-03-01 DIAGNOSIS — R41.3 MEMORY LOSS: ICD-10-CM

## 2020-03-01 DIAGNOSIS — I69.30 LATE EFFECT OF STROKE: ICD-10-CM

## 2020-03-01 DIAGNOSIS — I65.23 STENOSIS OF BOTH CAROTID ARTERIES WITHOUT CEREBRAL INFARCTION: ICD-10-CM

## 2020-03-01 DIAGNOSIS — G20 PARKINSON DISEASE (HCC): ICD-10-CM

## 2020-03-01 DIAGNOSIS — R56.9 CONVULSIONS, UNSPECIFIED CONVULSION TYPE (HCC): ICD-10-CM

## 2020-03-01 DIAGNOSIS — R27.0 ATAXIA: ICD-10-CM

## 2020-03-01 DIAGNOSIS — G30.1 DEMENTIA OF THE ALZHEIMER'S TYPE WITH LATE ONSET WITHOUT BEHAVIORAL DISTURBANCE (HCC): ICD-10-CM

## 2020-03-01 DIAGNOSIS — E53.8 B12 DEFICIENCY: ICD-10-CM

## 2020-03-01 RX ORDER — CARBIDOPA AND LEVODOPA 25; 100 MG/1; MG/1
2 TABLET ORAL 3 TIMES DAILY
Qty: 540 TAB | Refills: 2 | Status: SHIPPED | OUTPATIENT
Start: 2020-03-01 | End: 2021-07-11

## 2020-03-11 RX ORDER — PRAVASTATIN SODIUM 40 MG/1
TABLET ORAL
Qty: 90 TAB | Refills: 3 | Status: SHIPPED | OUTPATIENT
Start: 2020-03-11 | End: 2021-03-24

## 2020-03-11 RX ORDER — TAMSULOSIN HYDROCHLORIDE 0.4 MG/1
CAPSULE ORAL
Qty: 90 CAP | Refills: 3 | Status: SHIPPED | OUTPATIENT
Start: 2020-03-11 | End: 2021-03-25

## 2020-03-12 DIAGNOSIS — R56.9 CONVULSIONS, UNSPECIFIED CONVULSION TYPE (HCC): ICD-10-CM

## 2020-03-12 DIAGNOSIS — I63.312 THROMBOTIC STROKE INVOLVING LEFT MIDDLE CEREBRAL ARTERY (HCC): ICD-10-CM

## 2020-03-12 DIAGNOSIS — I65.23 STENOSIS OF BOTH CAROTID ARTERIES WITHOUT CEREBRAL INFARCTION: ICD-10-CM

## 2020-03-12 DIAGNOSIS — F02.80 DEMENTIA OF THE ALZHEIMER'S TYPE WITH LATE ONSET WITHOUT BEHAVIORAL DISTURBANCE (HCC): ICD-10-CM

## 2020-03-12 DIAGNOSIS — Z86.73 HISTORY OF CVA (CEREBROVASCULAR ACCIDENT): ICD-10-CM

## 2020-03-12 DIAGNOSIS — R55 VASOVAGAL SYNCOPE: ICD-10-CM

## 2020-03-12 DIAGNOSIS — I65.23 ASYMPTOMATIC CAROTID ARTERY STENOSIS, BILATERAL: ICD-10-CM

## 2020-03-12 DIAGNOSIS — G40.209 COMPLEX PARTIAL SEIZURE EVOLVING TO GENERALIZED SEIZURE (HCC): ICD-10-CM

## 2020-03-12 DIAGNOSIS — G20 PARKINSON DISEASE (HCC): ICD-10-CM

## 2020-03-12 DIAGNOSIS — G30.1 DEMENTIA OF THE ALZHEIMER'S TYPE WITH LATE ONSET WITHOUT BEHAVIORAL DISTURBANCE (HCC): ICD-10-CM

## 2020-03-12 DIAGNOSIS — R27.0 ATAXIA: ICD-10-CM

## 2020-03-12 DIAGNOSIS — R41.3 MEMORY LOSS: ICD-10-CM

## 2020-03-12 DIAGNOSIS — I69.30 LATE EFFECT OF STROKE: ICD-10-CM

## 2020-03-12 DIAGNOSIS — E53.8 B12 DEFICIENCY: ICD-10-CM

## 2020-03-12 RX ORDER — RASAGILINE 1 MG/1
TABLET ORAL
Qty: 90 TAB | Refills: 5 | Status: SHIPPED | OUTPATIENT
Start: 2020-03-12 | End: 2021-04-09

## 2020-05-22 DIAGNOSIS — R41.3 MEMORY LOSS: ICD-10-CM

## 2020-05-22 DIAGNOSIS — G20 PARKINSON DISEASE (HCC): ICD-10-CM

## 2020-05-22 DIAGNOSIS — G30.1 DEMENTIA OF THE ALZHEIMER'S TYPE WITH LATE ONSET WITHOUT BEHAVIORAL DISTURBANCE (HCC): ICD-10-CM

## 2020-05-22 DIAGNOSIS — G40.209 COMPLEX PARTIAL SEIZURE EVOLVING TO GENERALIZED SEIZURE (HCC): ICD-10-CM

## 2020-05-22 DIAGNOSIS — R56.9 CONVULSIONS, UNSPECIFIED CONVULSION TYPE (HCC): ICD-10-CM

## 2020-05-22 DIAGNOSIS — I69.30 LATE EFFECT OF STROKE: ICD-10-CM

## 2020-05-22 DIAGNOSIS — I65.23 ASYMPTOMATIC CAROTID ARTERY STENOSIS, BILATERAL: ICD-10-CM

## 2020-05-22 DIAGNOSIS — F02.80 DEMENTIA OF THE ALZHEIMER'S TYPE WITH LATE ONSET WITHOUT BEHAVIORAL DISTURBANCE (HCC): ICD-10-CM

## 2020-05-22 DIAGNOSIS — R27.0 ATAXIA: ICD-10-CM

## 2020-05-22 DIAGNOSIS — I65.23 STENOSIS OF BOTH CAROTID ARTERIES WITHOUT CEREBRAL INFARCTION: ICD-10-CM

## 2020-05-22 DIAGNOSIS — Z86.73 HISTORY OF CVA (CEREBROVASCULAR ACCIDENT): ICD-10-CM

## 2020-05-22 DIAGNOSIS — R55 VASOVAGAL SYNCOPE: ICD-10-CM

## 2020-05-22 DIAGNOSIS — E53.8 B12 DEFICIENCY: ICD-10-CM

## 2020-05-22 DIAGNOSIS — I63.312 THROMBOTIC STROKE INVOLVING LEFT MIDDLE CEREBRAL ARTERY (HCC): ICD-10-CM

## 2020-05-22 RX ORDER — ZONISAMIDE 100 MG/1
CAPSULE ORAL
Qty: 90 CAP | Refills: 3 | Status: SHIPPED | OUTPATIENT
Start: 2020-05-22 | End: 2021-05-31

## 2020-06-24 ENCOUNTER — OFFICE VISIT (OUTPATIENT)
Dept: INTERNAL MEDICINE CLINIC | Age: 85
End: 2020-06-24

## 2020-06-24 VITALS
HEIGHT: 69 IN | OXYGEN SATURATION: 97 % | TEMPERATURE: 98.9 F | RESPIRATION RATE: 18 BRPM | WEIGHT: 139 LBS | BODY MASS INDEX: 20.59 KG/M2 | HEART RATE: 81 BPM | DIASTOLIC BLOOD PRESSURE: 60 MMHG | SYSTOLIC BLOOD PRESSURE: 132 MMHG

## 2020-06-24 DIAGNOSIS — I65.23 STENOSIS OF BOTH CAROTID ARTERIES WITHOUT CEREBRAL INFARCTION: ICD-10-CM

## 2020-06-24 DIAGNOSIS — I10 ESSENTIAL HYPERTENSION: Primary | Chronic | ICD-10-CM

## 2020-06-24 DIAGNOSIS — E78.00 ELEVATED CHOLESTEROL: ICD-10-CM

## 2020-06-24 DIAGNOSIS — C61 PROSTATE CANCER (HCC): ICD-10-CM

## 2020-06-24 DIAGNOSIS — G40.909 SEIZURE DISORDER (HCC): ICD-10-CM

## 2020-06-24 DIAGNOSIS — D51.0 VITAMIN B12 DEFICIENCY ANEMIA DUE TO INTRINSIC FACTOR DEFICIENCY: ICD-10-CM

## 2020-06-24 DIAGNOSIS — F02.80 DEMENTIA OF THE ALZHEIMER'S TYPE WITH LATE ONSET WITHOUT BEHAVIORAL DISTURBANCE (HCC): ICD-10-CM

## 2020-06-24 DIAGNOSIS — G20 PARKINSONS DISEASE (HCC): ICD-10-CM

## 2020-06-24 DIAGNOSIS — Z79.899 ON STATIN THERAPY: ICD-10-CM

## 2020-06-24 DIAGNOSIS — G30.1 DEMENTIA OF THE ALZHEIMER'S TYPE WITH LATE ONSET WITHOUT BEHAVIORAL DISTURBANCE (HCC): ICD-10-CM

## 2020-06-24 DIAGNOSIS — I67.9 CEREBROVASCULAR DISEASE: ICD-10-CM

## 2020-06-24 LAB
A-G RATIO,AGRAT: 1.3 RATIO
ALBUMIN SERPL-MCNC: 4.5 G/DL (ref 3.9–5.4)
ALP SERPL-CCNC: 108 U/L (ref 38–126)
ALT SERPL-CCNC: 14 U/L (ref 0–50)
ANION GAP SERPL CALC-SCNC: 11 MMOL/L
AST SERPL W P-5'-P-CCNC: 58 U/L (ref 14–36)
BILIRUB SERPL-MCNC: 0.3 MG/DL (ref 0.2–1.3)
BUN SERPL-MCNC: 25 MG/DL (ref 9–20)
BUN/CREATININE RATIO,BUCR: 16 RATIO
CALCIUM SERPL-MCNC: 9.9 MG/DL (ref 8.4–10.2)
CHLORIDE SERPL-SCNC: 109 MMOL/L (ref 98–107)
CHOL/HDL RATIO,CHHD: 2 RATIO (ref 0–4)
CHOLEST SERPL-MCNC: 153 MG/DL (ref 0–200)
CK SERPL-CCNC: 72 U/L (ref 30–135)
CO2 SERPL-SCNC: 25 MMOL/L (ref 22–32)
CREAT SERPL-MCNC: 1.6 MG/DL (ref 0.8–1.5)
ERYTHROCYTE [DISTWIDTH] IN BLOOD BY AUTOMATED COUNT: 13.6 %
GLOBULIN,GLOB: 3.4
GLUCOSE SERPL-MCNC: 96 MG/DL (ref 75–110)
HCT VFR BLD AUTO: 36.8 % (ref 37–51)
HDLC SERPL-MCNC: 85 MG/DL (ref 35–130)
HGB BLD-MCNC: 11.9 G/DL (ref 12–18)
LDL/HDL RATIO,LDHD: 1 RATIO
LDLC SERPL CALC-MCNC: 57 MG/DL (ref 0–130)
LYMPHOCYTES ABSOLUTE: 1.6 K/UL (ref 0.6–4.1)
LYMPHOCYTES NFR BLD: 16.3 % (ref 10–58.5)
MCH RBC QN AUTO: 31.7 PG (ref 26–32)
MCHC RBC AUTO-ENTMCNC: 32.3 G/DL (ref 30–36)
MCV RBC AUTO: 98.1 FL (ref 80–97)
MONOCYTES ABS-DIF,2141: 0.5 K/UL (ref 0–1.8)
MONOCYTES NFR BLD: 5.1 % (ref 0.1–24)
NEUTROPHILS # BLD: 78.6 % (ref 37–92)
NEUTROPHILS ABS,2156: 7.9 K/UL (ref 2–7.8)
PLATELET # BLD AUTO: 193 K/UL (ref 140–440)
POTASSIUM SERPL-SCNC: 4.4 MMOL/L (ref 3.6–5)
PROT SERPL-MCNC: 7.9 G/DL (ref 6.3–8.2)
RBC # BLD AUTO: 3.75 M/UL (ref 4.2–6.3)
SODIUM SERPL-SCNC: 145 MMOL/L (ref 137–145)
TRIGL SERPL-MCNC: 57 MG/DL (ref 0–200)
VLDLC SERPL CALC-MCNC: 11 MG/DL
WBC # BLD AUTO: 10 K/UL (ref 4.1–10.9)

## 2020-06-24 NOTE — PROGRESS NOTES
Reviewed record in preparation for visit and have obtained necessary documentation. Identified pt with two pt identifiers(name and ). Chief Complaint   Patient presents with    Hypertension    Cholesterol Problem        Coordination of Care Questionnaire:  :     1) Have you been to an emergency room, urgent care clinic since your last visit? No     Hospitalized since your last visit? No               2) Have you seen or consulted any other health care providers outside of [address] [place] [address] since your last visit?  No

## 2020-06-24 NOTE — PROGRESS NOTES
This note will not be viewable in [address] S[place] T[place] . is a 80 y.o. male and presents with Hypertension and Cholesterol Problem  . Subjective:    Mr. Shayy Cardoso presents today for follow-up of hypertension, hyperlipidemia, and monitoring of statin therapy. He has a history of prostate cancer remotely and urinary outlet obstruction. He also has a history of cervical spinal stenosis. He has Parkinson's disease and seizure disorder. He is followed by neurology. His daughter is present with him today and notes that he sometimes does not take medications as prescribed. He complains of leg cramps at night. He states this wakes him up at about 2:00 in the morning. It interrupts his sleep. Review of Systems  Constitutional:   Eyes:   negative for visual disturbance and irritation  ENT:   negative for tinnitus,sore throat,nasal congestion,ear pains. hoarseness  Respiratory:  negative for cough, hemoptysis, dyspnea,wheezing  CV:   negative for chest pain, palpitations, lower extremity edema  GI:   negative for nausea, vomiting, diarrhea, abdominal pain,melena  Endo:               negative for polyuria,polydipsia,polyphagia,heat intolerance  Genitourinary: negative for frequency, dysuria and hematuria  Integumentary: negative for rash and pruritus  Hematologic:  negative for easy bruising and gum/nose bleeding  Musculoskel: negative for myalgias, arthralgias, back pain, muscle weakness, joint pain  Neurological:  negative for headaches, dizziness, vertigo, memory problems and gait   Behavl/Psych: negative for feelings of anxiety, depression, mood changes    Past Medical History:   Diagnosis Date    Arthritis of ankle 8/16/2017    Cancer [facility])     Prostate.    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  HX PROSTATECTOMY       Social History     Socioeconomic History    Marital status: SINGLE     Spouse name: Not on file    Number of children: Not on file    Years of education: Not on file    Highest education level: Not on file   Tobacco Use    Smoking status: Current Every Day Smoker     Packs/day: 0.50    Smokeless tobacco: Never Used   Substance and Sexual Activity    Alcohol use: No    Drug use: No    Sexual activity: Not Currently     Family History   Problem Relation Age of Onset    Dementia Mother     Stroke Father     Heart Disease Sister     Hypertension Sister     Hypertension Brother     Arthritis-osteo Brother     Heart Disease Brother     COPD Brother     Hypertension Child      Current Outpatient Medications   Medication Sig Dispense Refill    zonisamide (ZONEGRAN) 100 mg capsule TAKE 1 CAPSULE BY MOUTH EVERY DAY 90 Cap 3    rasagiline (AZILECT) 1 mg tablet TAKE 1 TABLET BY MOUTH EVERY DAY 90 Tab 5    tamsulosin (FLOMAX) 0.4 mg capsule TAKE 1 CAPSULE BY MOUTH EVERY DAY 90 Cap 3    pravastatin (PRAVACHOL) 40 mg tablet TAKE 1 TABLET BY MOUTH EVERY DAY 90 Tab 3    carbidopa-levodopa (SINEMET)  mg per tablet TAKE 2 TABS BY MOUTH THREE (3) TIMES DAILY.  540 Tab 2    aspirin-dipyridamole (AGGRENOX)  mg per SR capsule TAKE 1 CAPSULE BY MOUTH TWICE A DAY 60 Cap 14    amLODIPine (NORVASC) 5 mg tablet TAKE 1 TABLET BY MOUTH EVERY DAY 90 Tab 2    levETIRAcetam (KEPPRA) 500 mg tablet TAKE 1 TABLET BY MOUTH EVERY MORNING AND 2 AT BEDTIME 810 Tab 1    MYRBETRIQ 25 mg ER tablet TAKE 1 TABLET BY MOUTH EVERY DAY 90 Tab 3    levETIRAcetam (KEPPRA) 500 mg tablet TAKE 1 TABLET BY MOUTH EVERY MORNING AND 2 AT BEDTIME 270 Tab 4    multivit with minerals/lutein (MULTIVITAMIN 50 PLUS PO) Take 1 Tab by mouth daily. Allergies   Allergen Reactions    Pcn [Penicillins] Swelling       Objective:  Visit Vitals  /60 (BP 1 Location: Left arm, BP Patient Position: Sitting)   Pulse 81   Temp 98.9 °F (37.2 °C) (Oral)   Resp 18   Ht 5' 9\" (1.753 m)   Wt 139 lb (63 kg)   SpO2 97%   BMI 20.53 kg/m²     Physical Exam:   General appearance - alert, well appearing, and in no distress  Mental status - alert, oriented to person, place, and time  EYE-YARY, EOMI, fundi normal, corneas normal, no foreign bodies  ENT-ENT exam normal, no neck nodes or sinus tenderness  Nose - normal and patent, no erythema, discharge or polyps  Mouth - mucous membranes moist, pharynx normal without lesions  Neck - supple, no significant adenopathy   Chest - clear to auscultation, no wheezes, rales or rhonchi, symmetric air entry   Heart - normal rate, regular rhythm, normal S1, S2, no murmurs, rubs, clicks or gallops   Abdomen - soft, nontender, nondistended, no masses or organomegaly  Lymph- no adenopathy palpable  Ext-peripheral pulses normal, no pedal edema, no clubbing or cyanosis  Skin-Warm and dry. no hyperpigmentation, vitiligo, or suspicious lesions  Neuro -alert, oriented, normal speech, shuffling gait walks with a Rollator walker  Musculoskeletal- FROM, no bony abnormalities, no point tenderness    No results found for this visit on 06/24/20. All results for lab orders may not have been returned by the time this encountered was closed. Assessment/Plan:       ICD-10-CM ICD-9-CM    1. Essential hypertension L77 405.3 METABOLIC PANEL, COMPREHENSIVE      URINALYSIS W/O MICRO   2. Stenosis of both carotid arteries without cerebral infarction I65.23 433.10      433.30    3. Cerebrovascular disease I67.9 437.9 REFERRAL TO HOME HEALTH   4.  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 Parkinsons disease ([facility] G20 332.0 REFERRAL TO HOME HEALTH   6. Seizure disorder ([facility] G40.909 345.90 [facility]   7. Prostate cancer (HCC) C61 185 PSA, DIAGNOSTIC (PROSTATE SPECIFIC AG)   8. Elevated cholesterol E78.00 272.0 CK      LIPID PANEL   9. On statin therapy Z79.899 V58.69 CK      LIPID PANEL   10. Vitamin B12 deficiency anemia due to intrinsic factor deficiency D51.0 281.0 CBC WITH AUTOMATED DIFF      VITAMIN B12       Orders Placed This Encounter    CBC WITH AUTOMATED DIFF ([facility])    CK ([facility])    LIPID PANEL ([facility])    METABOLIC PANEL, COMPREHENSIVE ([facility])    PROSTATE SPECIFIC AG ([facility])    URINALYSIS W/O MICRO ([facility])    VITAMIN B12    [facility]     Referral Priority:   Routine     Referral Type:   Home Health Evaluation     Referral Reason:   Continuity of Care     Number of Visits Requested:   1     Plan:    Continue current medical regimen as outlined above. Further recommendations based on labs as ordered. Will consult home health social work for any additional assistance that may be offered. Follow-up and Dispositions    · Return in about 6 months (around 12/24/2020). I have reviewed with the patient details of the assessment and plan and all questions were answered. Relevent patient education was performed. Verbal and/or written instructions (see AVS) provided. The most recent lab findings were reviewed with the patient. Plan was discussed with patient who verbal expressed understanding. An After Visit Summary was printed and given to the patient.       Darcella Homans, MD

## 2020-06-25 ENCOUNTER — HOME HEALTH ADMISSION (OUTPATIENT)
Dept: HOME HEALTH SERVICES | Facility: HOME HEALTH | Age: 85
End: 2020-06-25
Payer: MEDICARE

## 2020-06-25 LAB — VIT B12 SERPL-MCNC: 961 PG/ML (ref 232–1245)

## 2020-06-26 ENCOUNTER — HOME CARE VISIT (OUTPATIENT)
Dept: SCHEDULING | Facility: HOME HEALTH | Age: 85
End: 2020-06-26
Payer: MEDICARE

## 2020-06-26 VITALS
TEMPERATURE: 99 F | RESPIRATION RATE: 16 BRPM | OXYGEN SATURATION: 98 % | SYSTOLIC BLOOD PRESSURE: 126 MMHG | DIASTOLIC BLOOD PRESSURE: 70 MMHG | HEART RATE: 74 BPM

## 2020-06-26 LAB — PSA, TEST22: 0 NG/ML (ref 0–4)

## 2020-06-26 PROCEDURE — 400013 HH SOC

## 2020-06-26 PROCEDURE — G0151 HHCP-SERV OF PT,EA 15 MIN: HCPCS

## 2020-07-01 ENCOUNTER — HOME CARE VISIT (OUTPATIENT)
Dept: HOME HEALTH SERVICES | Facility: HOME HEALTH | Age: 85
End: 2020-07-01
Payer: MEDICARE

## 2020-07-01 PROCEDURE — G0155 HHCP-SVS OF CSW,EA 15 MIN: HCPCS

## 2020-07-03 ENCOUNTER — HOME CARE VISIT (OUTPATIENT)
Dept: HOME HEALTH SERVICES | Facility: HOME HEALTH | Age: 85
End: 2020-07-03
Payer: MEDICARE

## 2020-07-03 ENCOUNTER — HOME CARE VISIT (OUTPATIENT)
Dept: SCHEDULING | Facility: HOME HEALTH | Age: 85
End: 2020-07-03
Payer: MEDICARE

## 2020-07-03 PROCEDURE — G0157 HHC PT ASSISTANT EA 15: HCPCS

## 2020-07-04 ENCOUNTER — HOME CARE VISIT (OUTPATIENT)
Dept: SCHEDULING | Facility: HOME HEALTH | Age: 85
End: 2020-07-04
Payer: MEDICARE

## 2020-07-05 VITALS
DIASTOLIC BLOOD PRESSURE: 70 MMHG | HEART RATE: 64 BPM | OXYGEN SATURATION: 99 % | SYSTOLIC BLOOD PRESSURE: 140 MMHG | TEMPERATURE: 97.5 F

## 2020-07-07 ENCOUNTER — HOME CARE VISIT (OUTPATIENT)
Dept: HOME HEALTH SERVICES | Facility: HOME HEALTH | Age: 85
End: 2020-07-07
Payer: MEDICARE

## 2020-07-07 ENCOUNTER — HOME CARE VISIT (OUTPATIENT)
Dept: SCHEDULING | Facility: HOME HEALTH | Age: 85
End: 2020-07-07
Payer: MEDICARE

## 2020-07-07 VITALS
DIASTOLIC BLOOD PRESSURE: 50 MMHG | HEART RATE: 74 BPM | OXYGEN SATURATION: 99 % | TEMPERATURE: 97.2 F | SYSTOLIC BLOOD PRESSURE: 128 MMHG

## 2020-07-07 PROCEDURE — G0151 HHCP-SERV OF PT,EA 15 MIN: HCPCS

## 2020-07-08 ENCOUNTER — HOME CARE VISIT (OUTPATIENT)
Dept: HOME HEALTH SERVICES | Facility: HOME HEALTH | Age: 85
End: 2020-07-08
Payer: MEDICARE

## 2020-07-08 ENCOUNTER — HOME CARE VISIT (OUTPATIENT)
Dept: SCHEDULING | Facility: HOME HEALTH | Age: 85
End: 2020-07-08
Payer: MEDICARE

## 2020-07-08 VITALS
SYSTOLIC BLOOD PRESSURE: 130 MMHG | HEART RATE: 71 BPM | TEMPERATURE: 97.3 F | OXYGEN SATURATION: 98 % | DIASTOLIC BLOOD PRESSURE: 52 MMHG

## 2020-07-08 PROCEDURE — G0153 HHCP-SVS OF S/L PATH,EA 15MN: HCPCS

## 2020-07-10 ENCOUNTER — HOME CARE VISIT (OUTPATIENT)
Dept: HOME HEALTH SERVICES | Facility: HOME HEALTH | Age: 85
End: 2020-07-10
Payer: MEDICARE

## 2020-07-10 ENCOUNTER — HOME CARE VISIT (OUTPATIENT)
Dept: SCHEDULING | Facility: HOME HEALTH | Age: 85
End: 2020-07-10
Payer: MEDICARE

## 2020-07-10 PROCEDURE — G0157 HHC PT ASSISTANT EA 15: HCPCS

## 2020-07-12 VITALS
DIASTOLIC BLOOD PRESSURE: 55 MMHG | TEMPERATURE: 98.2 F | HEART RATE: 76 BPM | OXYGEN SATURATION: 98 % | SYSTOLIC BLOOD PRESSURE: 100 MMHG

## 2020-07-14 ENCOUNTER — HOME CARE VISIT (OUTPATIENT)
Dept: HOME HEALTH SERVICES | Facility: HOME HEALTH | Age: 85
End: 2020-07-14
Payer: MEDICARE

## 2020-07-14 ENCOUNTER — HOME CARE VISIT (OUTPATIENT)
Dept: SCHEDULING | Facility: HOME HEALTH | Age: 85
End: 2020-07-14
Payer: MEDICARE

## 2020-07-14 PROCEDURE — G0153 HHCP-SVS OF S/L PATH,EA 15MN: HCPCS

## 2020-07-14 PROCEDURE — G0157 HHC PT ASSISTANT EA 15: HCPCS

## 2020-07-15 ENCOUNTER — HOME CARE VISIT (OUTPATIENT)
Dept: SCHEDULING | Facility: HOME HEALTH | Age: 85
End: 2020-07-15
Payer: MEDICARE

## 2020-07-15 ENCOUNTER — HOME CARE VISIT (OUTPATIENT)
Dept: HOME HEALTH SERVICES | Facility: HOME HEALTH | Age: 85
End: 2020-07-15
Payer: MEDICARE

## 2020-07-15 VITALS
HEART RATE: 68 BPM | SYSTOLIC BLOOD PRESSURE: 128 MMHG | RESPIRATION RATE: 20 BRPM | OXYGEN SATURATION: 95 % | TEMPERATURE: 98.7 F | DIASTOLIC BLOOD PRESSURE: 60 MMHG

## 2020-07-15 VITALS
OXYGEN SATURATION: 97 % | SYSTOLIC BLOOD PRESSURE: 136 MMHG | TEMPERATURE: 99 F | DIASTOLIC BLOOD PRESSURE: 62 MMHG | HEART RATE: 68 BPM

## 2020-07-15 VITALS
HEART RATE: 82 BPM | DIASTOLIC BLOOD PRESSURE: 52 MMHG | OXYGEN SATURATION: 97 % | TEMPERATURE: 98.6 F | SYSTOLIC BLOOD PRESSURE: 110 MMHG

## 2020-07-15 PROCEDURE — G0151 HHCP-SERV OF PT,EA 15 MIN: HCPCS

## 2020-07-16 ENCOUNTER — HOME CARE VISIT (OUTPATIENT)
Dept: HOME HEALTH SERVICES | Facility: HOME HEALTH | Age: 85
End: 2020-07-16
Payer: MEDICARE

## 2020-07-16 ENCOUNTER — HOME CARE VISIT (OUTPATIENT)
Dept: SCHEDULING | Facility: HOME HEALTH | Age: 85
End: 2020-07-16
Payer: MEDICARE

## 2020-07-16 VITALS
DIASTOLIC BLOOD PRESSURE: 60 MMHG | HEART RATE: 93 BPM | OXYGEN SATURATION: 92 % | TEMPERATURE: 96.4 F | SYSTOLIC BLOOD PRESSURE: 130 MMHG

## 2020-07-16 PROCEDURE — G0153 HHCP-SVS OF S/L PATH,EA 15MN: HCPCS

## 2020-07-20 ENCOUNTER — HOME CARE VISIT (OUTPATIENT)
Dept: HOME HEALTH SERVICES | Facility: HOME HEALTH | Age: 85
End: 2020-07-20
Payer: MEDICARE

## 2020-07-20 ENCOUNTER — HOME CARE VISIT (OUTPATIENT)
Dept: SCHEDULING | Facility: HOME HEALTH | Age: 85
End: 2020-07-20
Payer: MEDICARE

## 2020-07-20 PROCEDURE — G0157 HHC PT ASSISTANT EA 15: HCPCS

## 2020-07-21 ENCOUNTER — HOME CARE VISIT (OUTPATIENT)
Dept: HOME HEALTH SERVICES | Facility: HOME HEALTH | Age: 85
End: 2020-07-21
Payer: MEDICARE

## 2020-07-21 ENCOUNTER — HOME CARE VISIT (OUTPATIENT)
Dept: SCHEDULING | Facility: HOME HEALTH | Age: 85
End: 2020-07-21
Payer: MEDICARE

## 2020-07-21 VITALS
OXYGEN SATURATION: 94 % | TEMPERATURE: 96.6 F | SYSTOLIC BLOOD PRESSURE: 142 MMHG | HEART RATE: 94 BPM | DIASTOLIC BLOOD PRESSURE: 60 MMHG

## 2020-07-21 PROCEDURE — G0153 HHCP-SVS OF S/L PATH,EA 15MN: HCPCS

## 2020-07-23 ENCOUNTER — HOME CARE VISIT (OUTPATIENT)
Dept: HOME HEALTH SERVICES | Facility: HOME HEALTH | Age: 85
End: 2020-07-23
Payer: MEDICARE

## 2020-07-23 ENCOUNTER — HOME CARE VISIT (OUTPATIENT)
Dept: SCHEDULING | Facility: HOME HEALTH | Age: 85
End: 2020-07-23
Payer: MEDICARE

## 2020-07-23 VITALS
HEART RATE: 79 BPM | TEMPERATURE: 97.3 F | SYSTOLIC BLOOD PRESSURE: 126 MMHG | DIASTOLIC BLOOD PRESSURE: 48 MMHG | OXYGEN SATURATION: 98 %

## 2020-07-23 PROCEDURE — G0157 HHC PT ASSISTANT EA 15: HCPCS

## 2020-07-23 PROCEDURE — G0153 HHCP-SVS OF S/L PATH,EA 15MN: HCPCS

## 2020-07-28 ENCOUNTER — HOME CARE VISIT (OUTPATIENT)
Dept: SCHEDULING | Facility: HOME HEALTH | Age: 85
End: 2020-07-28
Payer: MEDICARE

## 2020-07-28 ENCOUNTER — HOME CARE VISIT (OUTPATIENT)
Dept: HOME HEALTH SERVICES | Facility: HOME HEALTH | Age: 85
End: 2020-07-28
Payer: MEDICARE

## 2020-07-28 PROCEDURE — 400013 HH SOC

## 2020-07-28 PROCEDURE — G0153 HHCP-SVS OF S/L PATH,EA 15MN: HCPCS

## 2020-07-28 PROCEDURE — G0157 HHC PT ASSISTANT EA 15: HCPCS

## 2020-07-29 VITALS
TEMPERATURE: 96.1 F | DIASTOLIC BLOOD PRESSURE: 52 MMHG | HEART RATE: 81 BPM | OXYGEN SATURATION: 94 % | SYSTOLIC BLOOD PRESSURE: 118 MMHG

## 2020-07-30 VITALS
OXYGEN SATURATION: 98 % | HEART RATE: 71 BPM | TEMPERATURE: 98.2 F | SYSTOLIC BLOOD PRESSURE: 115 MMHG | DIASTOLIC BLOOD PRESSURE: 78 MMHG

## 2020-07-31 ENCOUNTER — HOME CARE VISIT (OUTPATIENT)
Dept: SCHEDULING | Facility: HOME HEALTH | Age: 85
End: 2020-07-31
Payer: MEDICARE

## 2020-07-31 ENCOUNTER — HOME CARE VISIT (OUTPATIENT)
Dept: HOME HEALTH SERVICES | Facility: HOME HEALTH | Age: 85
End: 2020-07-31
Payer: MEDICARE

## 2020-07-31 VITALS
DIASTOLIC BLOOD PRESSURE: 50 MMHG | TEMPERATURE: 94.6 F | SYSTOLIC BLOOD PRESSURE: 100 MMHG | OXYGEN SATURATION: 96 % | HEART RATE: 75 BPM

## 2020-07-31 PROCEDURE — G0153 HHCP-SVS OF S/L PATH,EA 15MN: HCPCS

## 2020-07-31 PROCEDURE — G0157 HHC PT ASSISTANT EA 15: HCPCS

## 2020-08-01 VITALS
HEART RATE: 63 BPM | TEMPERATURE: 97.4 F | DIASTOLIC BLOOD PRESSURE: 62 MMHG | OXYGEN SATURATION: 96 % | SYSTOLIC BLOOD PRESSURE: 127 MMHG

## 2020-08-03 ENCOUNTER — HOME CARE VISIT (OUTPATIENT)
Dept: HOME HEALTH SERVICES | Facility: HOME HEALTH | Age: 85
End: 2020-08-03
Payer: MEDICARE

## 2020-08-04 ENCOUNTER — HOME CARE VISIT (OUTPATIENT)
Dept: SCHEDULING | Facility: HOME HEALTH | Age: 85
End: 2020-08-04
Payer: MEDICARE

## 2020-08-04 ENCOUNTER — HOME CARE VISIT (OUTPATIENT)
Dept: HOME HEALTH SERVICES | Facility: HOME HEALTH | Age: 85
End: 2020-08-04
Payer: MEDICARE

## 2020-08-04 PROCEDURE — G0151 HHCP-SERV OF PT,EA 15 MIN: HCPCS

## 2020-08-05 VITALS
SYSTOLIC BLOOD PRESSURE: 115 MMHG | TEMPERATURE: 98.1 F | HEART RATE: 76 BPM | OXYGEN SATURATION: 96 % | DIASTOLIC BLOOD PRESSURE: 60 MMHG

## 2020-08-06 ENCOUNTER — HOME CARE VISIT (OUTPATIENT)
Dept: HOME HEALTH SERVICES | Facility: HOME HEALTH | Age: 85
End: 2020-08-06
Payer: MEDICARE

## 2020-08-06 ENCOUNTER — HOME CARE VISIT (OUTPATIENT)
Dept: SCHEDULING | Facility: HOME HEALTH | Age: 85
End: 2020-08-06
Payer: MEDICARE

## 2020-08-06 PROCEDURE — G0153 HHCP-SVS OF S/L PATH,EA 15MN: HCPCS

## 2020-08-06 PROCEDURE — G0151 HHCP-SERV OF PT,EA 15 MIN: HCPCS

## 2020-08-07 VITALS
TEMPERATURE: 94.6 F | SYSTOLIC BLOOD PRESSURE: 128 MMHG | OXYGEN SATURATION: 93 % | DIASTOLIC BLOOD PRESSURE: 52 MMHG | HEART RATE: 76 BPM

## 2020-08-07 VITALS
SYSTOLIC BLOOD PRESSURE: 110 MMHG | RESPIRATION RATE: 16 BRPM | DIASTOLIC BLOOD PRESSURE: 66 MMHG | HEART RATE: 80 BPM | OXYGEN SATURATION: 95 % | TEMPERATURE: 97.8 F

## 2020-08-08 ENCOUNTER — HOME CARE VISIT (OUTPATIENT)
Dept: SCHEDULING | Facility: HOME HEALTH | Age: 85
End: 2020-08-08
Payer: MEDICARE

## 2020-10-22 RX ORDER — AMLODIPINE BESYLATE 5 MG/1
TABLET ORAL
Qty: 90 TAB | Refills: 2 | Status: SHIPPED | OUTPATIENT
Start: 2020-10-22 | End: 2021-08-31

## 2020-11-02 RX ORDER — MIRABEGRON 25 MG/1
TABLET, FILM COATED, EXTENDED RELEASE ORAL
Qty: 90 TAB | Refills: 3 | Status: SHIPPED | OUTPATIENT
Start: 2020-11-02 | End: 2021-11-15

## 2020-12-02 RX ORDER — ENALAPRIL MALEATE 5 MG/1
TABLET ORAL
Qty: 90 TAB | Refills: 3 | OUTPATIENT
Start: 2020-12-02

## 2020-12-02 NOTE — TELEPHONE ENCOUNTER
Last Refill: ?   Last Visit: 6/24/2020   Next Visit: 12/17/2020    Requested Prescriptions     Pending Prescriptions Disp Refills    enalapril (VASOTEC) 5 mg tablet 90 Tab 3

## 2020-12-16 DIAGNOSIS — F02.80 DEMENTIA OF THE ALZHEIMER'S TYPE WITH LATE ONSET WITHOUT BEHAVIORAL DISTURBANCE (HCC): ICD-10-CM

## 2020-12-16 DIAGNOSIS — I63.312 THROMBOTIC STROKE INVOLVING LEFT MIDDLE CEREBRAL ARTERY (HCC): ICD-10-CM

## 2020-12-16 DIAGNOSIS — G20 PARKINSON DISEASE (HCC): ICD-10-CM

## 2020-12-16 DIAGNOSIS — R56.9 CONVULSIONS, UNSPECIFIED CONVULSION TYPE (HCC): ICD-10-CM

## 2020-12-16 DIAGNOSIS — R55 VASOVAGAL SYNCOPE: ICD-10-CM

## 2020-12-16 DIAGNOSIS — Z86.73 HISTORY OF CVA (CEREBROVASCULAR ACCIDENT): ICD-10-CM

## 2020-12-16 DIAGNOSIS — G30.1 DEMENTIA OF THE ALZHEIMER'S TYPE WITH LATE ONSET WITHOUT BEHAVIORAL DISTURBANCE (HCC): ICD-10-CM

## 2020-12-16 DIAGNOSIS — G40.209 COMPLEX PARTIAL SEIZURE EVOLVING TO GENERALIZED SEIZURE (HCC): ICD-10-CM

## 2020-12-16 DIAGNOSIS — E53.8 B12 DEFICIENCY: ICD-10-CM

## 2020-12-16 DIAGNOSIS — R41.3 MEMORY LOSS: ICD-10-CM

## 2020-12-16 DIAGNOSIS — R27.0 ATAXIA: ICD-10-CM

## 2020-12-16 DIAGNOSIS — I69.30 LATE EFFECT OF STROKE: ICD-10-CM

## 2020-12-16 DIAGNOSIS — I65.23 ASYMPTOMATIC CAROTID ARTERY STENOSIS, BILATERAL: ICD-10-CM

## 2020-12-16 DIAGNOSIS — I65.23 STENOSIS OF BOTH CAROTID ARTERIES WITHOUT CEREBRAL INFARCTION: ICD-10-CM

## 2020-12-16 RX ORDER — LEVETIRACETAM 500 MG/1
TABLET ORAL
Qty: 810 TAB | Refills: 1 | Status: SHIPPED | OUTPATIENT
Start: 2020-12-16 | End: 2020-12-17 | Stop reason: SDUPTHER

## 2020-12-16 NOTE — TELEPHONE ENCOUNTER
Requested Prescriptions     Pending Prescriptions Disp Refills    levETIRAcetam (KEPPRA) 500 mg tablet 810 Tab 1     Sig: TAKE 1 TABLET BY MOUTH EVERY MORNING AND 2 AT BEDTIME

## 2020-12-17 ENCOUNTER — OFFICE VISIT (OUTPATIENT)
Dept: INTERNAL MEDICINE CLINIC | Age: 85
End: 2020-12-17
Payer: MEDICARE

## 2020-12-17 VITALS
HEART RATE: 80 BPM | RESPIRATION RATE: 16 BRPM | TEMPERATURE: 98.1 F | WEIGHT: 142 LBS | DIASTOLIC BLOOD PRESSURE: 72 MMHG | BODY MASS INDEX: 21.03 KG/M2 | SYSTOLIC BLOOD PRESSURE: 130 MMHG | HEIGHT: 69 IN

## 2020-12-17 DIAGNOSIS — G40.209 COMPLEX PARTIAL SEIZURE EVOLVING TO GENERALIZED SEIZURE (HCC): ICD-10-CM

## 2020-12-17 DIAGNOSIS — G30.1 DEMENTIA OF THE ALZHEIMER'S TYPE WITH LATE ONSET WITHOUT BEHAVIORAL DISTURBANCE (HCC): ICD-10-CM

## 2020-12-17 DIAGNOSIS — Z23 NEEDS FLU SHOT: Primary | ICD-10-CM

## 2020-12-17 DIAGNOSIS — R60.0 PEDAL EDEMA: ICD-10-CM

## 2020-12-17 DIAGNOSIS — I10 ESSENTIAL HYPERTENSION: Chronic | ICD-10-CM

## 2020-12-17 DIAGNOSIS — F02.80 DEMENTIA OF THE ALZHEIMER'S TYPE WITH LATE ONSET WITHOUT BEHAVIORAL DISTURBANCE (HCC): ICD-10-CM

## 2020-12-17 DIAGNOSIS — G20 PARKINSONS DISEASE (HCC): ICD-10-CM

## 2020-12-17 DIAGNOSIS — I63.312 THROMBOTIC STROKE INVOLVING LEFT MIDDLE CEREBRAL ARTERY (HCC): ICD-10-CM

## 2020-12-17 PROCEDURE — G8427 DOCREV CUR MEDS BY ELIG CLIN: HCPCS | Performed by: INTERNAL MEDICINE

## 2020-12-17 PROCEDURE — 3288F FALL RISK ASSESSMENT DOCD: CPT | Performed by: INTERNAL MEDICINE

## 2020-12-17 PROCEDURE — G0008 ADMIN INFLUENZA VIRUS VAC: HCPCS | Performed by: INTERNAL MEDICINE

## 2020-12-17 PROCEDURE — G8536 NO DOC ELDER MAL SCRN: HCPCS | Performed by: INTERNAL MEDICINE

## 2020-12-17 PROCEDURE — G8510 SCR DEP NEG, NO PLAN REQD: HCPCS | Performed by: INTERNAL MEDICINE

## 2020-12-17 PROCEDURE — 99214 OFFICE O/P EST MOD 30 MIN: CPT | Performed by: INTERNAL MEDICINE

## 2020-12-17 PROCEDURE — G8420 CALC BMI NORM PARAMETERS: HCPCS | Performed by: INTERNAL MEDICINE

## 2020-12-17 PROCEDURE — 1100F PTFALLS ASSESS-DOCD GE2>/YR: CPT | Performed by: INTERNAL MEDICINE

## 2020-12-17 PROCEDURE — 90694 VACC AIIV4 NO PRSRV 0.5ML IM: CPT | Performed by: INTERNAL MEDICINE

## 2020-12-17 NOTE — PROGRESS NOTES
This note will not be viewable in [address] Jina Haynes. is a 80 y.o. male and presents with Hypertension (follow up), Cholesterol Problem (follow up), and Parkinsons Disease (follow up)  . Subjective:    Mr. Michael presents today accompanied by his daughter for follow-up of several problems including hypertension, hyperlipidemia, history of thrombotic stroke, history of seizure disorder, Parkinson's disease, and dementia. He also has a history of detrusor instability. He has had increased swelling in his lower extremities and feet especially. He states this is present all the time and not just at the end of the day. He has trouble getting his shoes on. He has no shortness of breath, chest pain, palpitations, PND, orthopnea, or pedal edema. Past Medical History:   Diagnosis Date    Arthritis of ankle 8/16/2017    Cancer [facility])     Prostate. 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    Edema 8/16/2017    Elevated cholesterol 8/16/2017    Fatigue 8/16/2017    Gastrointestinal disorder     ulcer    Hypertension     On statin therapy 8/16/2017    Parkinsons disease ([facility] 8/16/2017    Pernicious anemia 8/16/2017    Prostate cancer ([facility] 8/16/2017    Raynaud disease 8/16/2017    Seizure disorder ([facility] 8/16/2017    Tobacco abuse 8/16/2017    Weight loss 8/16/2017     Past Surgical History:   Procedure Laterality Date    ABDOMEN SURGERY PROC UNLISTED      Ulcer.      HX PROSTATECTOMY       Allergies   Allergen Reactions    Pcn [Penicillins] Swelling     Current Outpatient Medications   Medication Sig Dispense Refill    Myrbetriq 25 mg ER tablet TAKE 1 TABLET BY MOUTH EVERY DAY 90 Tab 3    amLODIPine (NORVASC) 5 mg tablet TAKE 1 TABLET BY MOUTH EVERY DAY 90 Tab 2    zonisamide (ZONEGRAN) 100 mg capsule TAKE 1 CAPSULE BY MOUTH EVERY DAY 90 Cap 3    rasagiline (AZILECT) 1 mg tablet TAKE 1 TABLET BY MOUTH EVERY DAY 90 Tab 5    tamsulosin (FLOMAX) 0.4 mg capsule TAKE 1 CAPSULE BY MOUTH EVERY DAY 90 Cap 3    pravastatin (PRAVACHOL) 40 mg tablet TAKE 1 TABLET BY MOUTH EVERY DAY 90 Tab 3    carbidopa-levodopa (SINEMET)  mg per tablet TAKE 2 TABS BY MOUTH THREE (3) TIMES DAILY. 540 Tab 2    aspirin-dipyridamole (AGGRENOX)  mg per SR capsule TAKE 1 CAPSULE BY MOUTH TWICE A DAY 60 Cap 14    levETIRAcetam (KEPPRA) 500 mg tablet TAKE 1 TABLET BY MOUTH EVERY MORNING AND 2 AT BEDTIME 270 Tab 4    multivit with minerals/lutein (MULTIVITAMIN 50 PLUS PO) Take 1 Tab by mouth daily. Social History     Socioeconomic History    Marital status: SINGLE     Spouse name: Not on file    Number of children: Not on file    Years of education: Not on file    Highest education level: Not on file   Tobacco Use    Smoking status: Current Every Day Smoker     Packs/day: 0.50    Smokeless tobacco: Never Used   Substance and Sexual Activity    Alcohol use: No    Drug use: No    Sexual activity: Not Currently     Family History   Problem Relation Age of Onset    Dementia Mother     Stroke Father     Heart Disease Sister     Hypertension Sister     Hypertension Brother     Arthritis-osteo Brother     Heart Disease Brother     COPD Brother     Hypertension Child        Review of Systems  Constitutional:  negative for fevers, chills, anorexia and weight loss  Eyes:    negative for visual disturbance and irritation  ENT:    negative for tinnitus,sore throat,nasal congestion,ear pains. hoarseness  Respiratory:     negative for cough, hemoptysis, dyspnea,wheezing  CV:    negative for chest pain, palpitations, lower extremity edema  GI:    negative for nausea, vomiting, diarrhea, abdominal pain,melena  Endo:               negative for polyuria,polydipsia,polyphagia,heat intolerance  Genitourinary : negative for frequency, dysuria and hematuria  Integumentary: negative for rash and pruritus  Hematologic:   negative for easy bruising and gum/nose bleeding  Musculoskel:  negative for myalgias, arthralgias, back pain, muscle weakness, joint pain  Neurological:   negative for headaches, dizziness, vertigo, memory problems and gait   Behavl/Psych:  negative for feelings of anxiety, depression, mood changes  ROS otherwise negative      Objective:  Visit Vitals  /72 (BP 1 Location: Left arm, BP Patient Position: Sitting)   Pulse 80   Temp 98.1 °F (36.7 °C) (Oral)   Resp 16   Ht 5' 9\" (1.753 m)   Wt 142 lb (64.4 kg)   BMI 20.97 kg/m²     Physical Exam:   General appearance - alert, well appearing, and in no distress  Mental status - alert, oriented to person, place, and time  EYE-YARY, EOMI, fundi normal, corneas normal, no foreign bodies  ENT-ENT exam normal, no neck nodes or sinus tenderness  Nose - normal and patent, no erythema, discharge or polyps  Mouth - mucous membranes moist, pharynx normal without lesions  Neck - supple, no significant adenopathy   Chest - clear to auscultation, no wheezes, rales or rhonchi, symmetric air entry   Heart - normal rate, regular rhythm, normal S1, S2, no murmurs, rubs, clicks or gallops   Abdomen - soft, nontender, nondistended, no masses or organomegaly  Lymph- no adenopathy palpable  Ext-peripheral pulses normal, no pedal edema, no clubbing or cyanosis  Skin-Warm and dry. no hyperpigmentation, vitiligo, or suspicious lesions  Neuro -alert, oriented, normal speech, no focal findings or movement disorder noted      Assessment/Plan:  Diagnoses and all orders for this visit:    1. Needs flu shot  -     FLU (FLUAD QUAD INFLUENZA VACCINE,QUAD,ADJUVANTED)    2. Essential hypertension    3. Thrombotic stroke involving left middle cerebral artery ([address]    4. Parkinsons disease ([address]    5. 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 Pedal edema          ICD-10-CM ICD-9-CM    1.  Needs flu shot  Z23 V04.81 FLU (FLUAD QUAD INFLUENZA VACCINE,QUAD,ADJUVANTED)   2. Essential hypertension  I10 401.9    3. Thrombotic stroke involving left middle cerebral artery ([facility]  I63.312 434.01    4. Parkinsons disease ([facility]  G20 332.0    5. 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 Pedal edema  R60.0 782. 3      Plan:    Discontinue amlodipine. Will monitor blood pressure and if greater than 140/90 will treat with another antihypertensive regimen. He will remain on his other medications as prescribed. We reviewed his labs done several months ago which are stable at this time. Return to clinic in 6 months unless otherwise indicated. He will be given a flu vaccine today. I have reviewed with the patient details of the assessment and plan and all questions were answered. Relevent patient education was performed. Verbal and/or written instructions (see AVS) provided. The most recent lab findings were reviewed with the patient. Plan was discussed with patient who verbally expressed understanding. An After Visit Summary was printed and given to the patient.     Cooper Turner MD

## 2020-12-17 NOTE — PROGRESS NOTES
Rianna Peterson presents today at the clinic for    Chief Complaint   Patient presents with    Hypertension     follow up    Cholesterol Problem     follow up    Parkinsons Disease     follow up        Wt Readings from Last 3 Encounters:   12/17/20 142 lb (64.4 kg)   06/24/20 139 lb (63 kg)   12/11/19 132 lb (59.9 kg)     Temp Readings from Last 3 Encounters:   12/17/20 98.1 °F (36.7 °C) (Oral)   08/06/20 (!) 94.6 °F (34.8 °C) (Temporal)   08/06/20 97.8 °F (36.6 °C)     BP Readings from Last 3 Encounters:   12/17/20 130/72   08/06/20 128/52   08/06/20 110/66     Pulse Readings from Last 3 Encounters:   12/17/20 80   08/06/20 76   08/06/20 80       Health Maintenance Due   Topic    DTaP/Tdap/Td series (1 - Tdap)    Shingrix Vaccine Age 50> (1 of 2)    GLAUCOMA SCREENING Q2Y     Flu Vaccine (1)    Medicare Yearly Exam          Learning Assessment:  :     Learning Assessment 3/22/2018 9/6/2017 4/19/2016   PRIMARY LEARNER Patient Patient Patient   HIGHEST LEVEL OF EDUCATION - PRIMARY LEARNER  - DID NOT GRADUATE HIGH SCHOOL -   BARRIERS PRIMARY LEARNER - NONE -   CO-LEARNER CAREGIVER - No -   PRIMARY LANGUAGE ENGLISH ENGLISH ENGLISH   LEARNER PREFERENCE PRIMARY DEMONSTRATION READING DEMONSTRATION   ANSWERED BY patient patient patient   RELATIONSHIP SELF SELF SELF       Depression Screening:  :     3 most recent PHQ Screens 12/17/2020   PHQ Not Done -   Little interest or pleasure in doing things Not at all   Feeling down, depressed, irritable, or hopeless Not at all   Total Score PHQ 2 0       Fall Risk Assessment:  :     Fall Risk Assessment, last 12 mths 6/24/2020   Able to walk? Yes   Fall in past 12 months? Yes   Fall with injury? No   Number of falls in past 12 months 2   Fall Risk Score 2       Abuse Screening:  :     Abuse Screening Questionnaire 3/25/2019   Do you ever feel afraid of your partner? N   Are you in a relationship with someone who physically or mentally threatens you?  N   Is it safe for you to go home? Y       Coordination of Care Questionnaire:  :     1. Have you been to the ER, urgent care clinic since your last visit? Hospitalized since your last visit? no    2. Have you seen or consulted any other health care providers outside of the [address] [place] [address] since your last visit? Include any pap smears or colon screening. No      After obtaining consent, and per orders of Dr. Abhi Cronin, injection of flu shot given by Zabrina Salvador LPN. Patient instructed to remain in clinic for 20 minutes afterwards, and to report any adverse reaction to me immediately.

## 2020-12-17 NOTE — PATIENT INSTRUCTIONS
Vaccine Information Statement Influenza (Flu) Vaccine (Inactivated or Recombinant): What You Need to Know Many Vaccine Information Statements are available in Korean and other languages. See www.immunize.org/vis Hojas de información sobre vacunas están disponibles en español y en muchos otros idiomas. Visite www.immunize.org/vis 1. Why get vaccinated? Influenza vaccine can prevent influenza (flu). Flu is a contagious disease that spreads around the [facility] every year, usually between October and May. Anyone can get the flu, but it is more dangerous for some people. Infants and young children, people 72years of age and older, pregnant women, and people with certain health conditions or a weakened immune system are at greatest risk of flu complications. Pneumonia, bronchitis, sinus infections and ear infections are examples of flu-related complications. If you have a medical condition, such as heart disease, cancer or diabetes, flu can make it worse. Flu can cause fever and chills, sore throat, muscle aches, fatigue, cough, headache, and runny or stuffy nose. Some people may have vomiting and diarrhea, though this is more common in children than adults. Each year thousands of people in the [facility] die from flu, and many more are hospitalized. Flu vaccine prevents millions of illnesses and flu-related visits to the doctor each year. 2. Influenza vaccines CDC recommends everyone 10months of age and older get vaccinated every flu season. Children 6 months through 6years of age may need 2 doses during a single flu season. Everyone else needs only 1 dose each flu season. It takes about 2 weeks for protection to develop after vaccination. There are many flu viruses, and they are always changing. Each year a new flu vaccine is made to protect against three or four viruses that are likely to cause disease in the upcoming flu season. Even when the vaccine doesnt exactly match these viruses, it may still provide some protection. Influenza vaccine does not cause flu. Influenza vaccine may be given at the same time as other vaccines. 3. Talk with your health care provider Tell your vaccine provider if the person getting the vaccine: 
 Has had an allergic reaction after a previous dose of influenza vaccine, or has any severe, life-threatening allergies.  Has ever had Guillain-Barré Syndrome (also called GBS). In some cases, your health care provider may decide to postpone influenza vaccination to a future visit. People with minor illnesses, such as a cold, may be vaccinated. People who are moderately or severely ill should usually wait until they recover before getting influenza vaccine. Your health care provider can give you more information. 4. Risks of a reaction  Soreness, redness, and swelling where shot is given, fever, muscle aches, and headache can happen after influenza vaccine.  There may be a very small increased risk of Guillain-Barré Syndrome (GBS) after inactivated influenza vaccine (the flu shot). Zenaida Aquino children who get the flu shot along with pneumococcal vaccine (PCV13), and/or DTaP vaccine at the same time might be slightly more likely to have a seizure caused by fever. Tell your health care provider if a child who is getting flu vaccine has ever had a seizure. People sometimes faint after medical procedures, including vaccination. Tell your provider if you feel dizzy or have vision changes or ringing in the ears. As with any medicine, there is a very remote chance of a vaccine causing a severe allergic reaction, other serious injury, or death. 5. What if there is a serious problem? An allergic reaction could occur after the vaccinated person leaves the clinic. If you see signs of a severe allergic reaction (hives, swelling of the face and throat, difficulty breathing, a fast heartbeat, dizziness, or weakness), call 9-1-1 and get the person to the nearest hospital. 
 
For other signs that concern you, call your health care provider. Adverse reactions should be reported to the Vaccine Adverse Event Reporting System (VAERS). Your health care provider will usually file this report, or you can do it yourself. Visit the VAERS website at www.vaers. hhs.gov or call 3-775.367.1583. VAERS is only for reporting reactions, and VAERS staff do not give medical advice. 6. The National Vaccine Injury Compensation Program 
 
The [facility] Vaccine Injury Compensation Program (VICP) is a federal program that was created to compensate people who may have been injured by certain vaccines. Visit the VICP website at www.hrsa.gov/vaccinecompensation or call 5-801.951.3876 to learn about the program and about filing a claim. There is a time limit to file a claim for compensation. 7. How can I learn more?  Ask your health care provider.  Call your local or state health department.  Contact the Centers for Disease Control and Prevention (CDC): 
- Call 5-141.916.5196 (1-800-CDC-INFO) or 
- Visit CDCs influenza website at www.cdc.gov/flu Vaccine Information Statement (Interim) Inactivated Influenza Vaccine 8/15/2019 
[address] [employer] [id number] Department of Health and [employer] Centers for Disease Control and Prevention Office Use Only

## 2021-01-12 ENCOUNTER — OFFICE VISIT (OUTPATIENT)
Dept: INTERNAL MEDICINE CLINIC | Age: 86
End: 2021-01-12
Payer: MEDICARE

## 2021-01-12 VITALS
BODY MASS INDEX: 21.39 KG/M2 | DIASTOLIC BLOOD PRESSURE: 74 MMHG | OXYGEN SATURATION: 97 % | WEIGHT: 144.4 LBS | SYSTOLIC BLOOD PRESSURE: 148 MMHG | RESPIRATION RATE: 16 BRPM | HEIGHT: 69 IN | TEMPERATURE: 97.7 F | HEART RATE: 59 BPM

## 2021-01-12 DIAGNOSIS — I10 ESSENTIAL HYPERTENSION: Primary | ICD-10-CM

## 2021-01-12 DIAGNOSIS — R60.0 PEDAL EDEMA: ICD-10-CM

## 2021-01-12 LAB
ANION GAP SERPL CALC-SCNC: 11 MMOL/L
BUN SERPL-MCNC: 25 MG/DL (ref 9–20)
CALCIUM SERPL-MCNC: 9.3 MG/DL (ref 8.4–10.2)
CHLORIDE SERPL-SCNC: 104 MMOL/L (ref 98–107)
CO2 SERPL-SCNC: 29 MMOL/L (ref 22–32)
CREAT SERPL-MCNC: 1.3 MG/DL (ref 0.8–1.5)
GLUCOSE SERPL-MCNC: 98 MG/DL (ref 75–110)
POTASSIUM SERPL-SCNC: 5 MMOL/L (ref 3.6–5)
SODIUM SERPL-SCNC: 144 MMOL/L (ref 137–145)

## 2021-01-12 PROCEDURE — 80048 BASIC METABOLIC PNL TOTAL CA: CPT | Performed by: INTERNAL MEDICINE

## 2021-01-12 PROCEDURE — 1100F PTFALLS ASSESS-DOCD GE2>/YR: CPT | Performed by: INTERNAL MEDICINE

## 2021-01-12 PROCEDURE — G8427 DOCREV CUR MEDS BY ELIG CLIN: HCPCS | Performed by: INTERNAL MEDICINE

## 2021-01-12 PROCEDURE — G8510 SCR DEP NEG, NO PLAN REQD: HCPCS | Performed by: INTERNAL MEDICINE

## 2021-01-12 PROCEDURE — 3288F FALL RISK ASSESSMENT DOCD: CPT | Performed by: INTERNAL MEDICINE

## 2021-01-12 PROCEDURE — G8420 CALC BMI NORM PARAMETERS: HCPCS | Performed by: INTERNAL MEDICINE

## 2021-01-12 PROCEDURE — G8536 NO DOC ELDER MAL SCRN: HCPCS | Performed by: INTERNAL MEDICINE

## 2021-01-12 PROCEDURE — 99214 OFFICE O/P EST MOD 30 MIN: CPT | Performed by: INTERNAL MEDICINE

## 2021-01-12 RX ORDER — FUROSEMIDE 20 MG/1
20 TABLET ORAL DAILY
Qty: 30 TAB | Refills: 2 | Status: SHIPPED | OUTPATIENT
Start: 2021-01-12 | End: 2021-04-05

## 2021-01-12 NOTE — PROGRESS NOTES
Norma Blum. is a 80 y.o. male and presents with Foot Swelling (both) and Ankle swelling  . Subjective:  Mr. Joanne Arthur presents today with complaint of foot and ankle swelling. His amlodipine had been discontinued because it was felt this might be contributing to his symptoms. However after discontinuation of his amlodipine his blood pressure has increased and his swelling in his feet has persisted. He denies any shortness of breath, PND, or orthopnea. He did not have lab work done on his most recent follow-up visit. Past Medical History:   Diagnosis Date    Arthritis of ankle 8/16/2017    Cancer [facility])     Prostate. 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    Edema 8/16/2017    Elevated cholesterol 8/16/2017    Fatigue 8/16/2017    Gastrointestinal disorder     ulcer    Hypertension     On statin therapy 8/16/2017    Parkinsons disease ([facility] [address] 8/16/2017    Pernicious anemia 8/16/2017    Prostate cancer ([facility] [address] 8/16/2017    Raynaud disease 8/16/2017    Seizure disorder ([facility] [address] 8/16/2017    Tobacco abuse 8/16/2017    Weight loss 8/16/2017     Past Surgical History:   Procedure Laterality Date    HX PROSTATECTOMY      [facility] ABDOMEN SURGERY PROC UNLISTED      Ulcer. Allergies   Allergen Reactions    Pcn [Penicillins] Swelling     Current Outpatient Medications   Medication Sig Dispense Refill    furosemide (LASIX) 20 mg tablet Take 1 Tab by mouth daily.  30 Tab 2    Myrbetriq 25 mg ER tablet TAKE 1 TABLET BY MOUTH EVERY DAY 90 Tab 3    zonisamide (ZONEGRAN) 100 mg capsule TAKE 1 CAPSULE BY MOUTH EVERY DAY 90 Cap 3    rasagiline (AZILECT) 1 mg tablet TAKE 1 TABLET BY MOUTH EVERY DAY 90 Tab 5    tamsulosin (FLOMAX) 0.4 mg capsule TAKE 1 CAPSULE BY MOUTH EVERY DAY 90 Cap 3    pravastatin (PRAVACHOL) 40 mg tablet TAKE 1 TABLET BY MOUTH EVERY DAY 90 Tab 3    carbidopa-levodopa (SINEMET)  mg per tablet TAKE 2 TABS BY MOUTH THREE (3) TIMES DAILY. 540 Tab 2    aspirin-dipyridamole (AGGRENOX)  mg per SR capsule TAKE 1 CAPSULE BY MOUTH TWICE A DAY 60 Cap 14    levETIRAcetam (KEPPRA) 500 mg tablet TAKE 1 TABLET BY MOUTH EVERY MORNING AND 2 AT BEDTIME 270 Tab 4    multivit with minerals/lutein (MULTIVITAMIN 50 PLUS PO) Take 1 Tab by mouth daily.  amLODIPine (NORVASC) 5 mg tablet TAKE 1 TABLET BY MOUTH EVERY DAY 90 Tab 2     Social History     Socioeconomic History    Marital status: SINGLE     Spouse name: Not on file    Number of children: Not on file    Years of education: Not on file    Highest education level: Not on file   Tobacco Use    Smoking status: Current Every Day Smoker     Packs/day: 0.50    Smokeless tobacco: Never Used   Substance and Sexual Activity    Alcohol use: No    Drug use: No    Sexual activity: Not Currently     Family History   Problem Relation Age of Onset    Dementia Mother     Stroke Father     Heart Disease Sister     Hypertension Sister     Hypertension Brother     Arthritis-osteo Brother     Heart Disease Brother     COPD Brother     Hypertension Child        Review of Systems  Constitutional:  negative for fevers, chills, anorexia and weight loss  Eyes:    negative for visual disturbance and irritation  ENT:    negative for tinnitus,sore throat,nasal congestion,ear pains. hoarseness  Respiratory:     negative for cough, hemoptysis, dyspnea,wheezing  CV:    negative for chest pain, palpitations  GI:    negative for nausea, vomiting, diarrhea, abdominal pain,melena  Endo:               negative for polyuria,polydipsia,polyphagia,heat intolerance  Genitourinary : negative for frequency, dysuria and hematuria  Integumentary: negative for rash and pruritus  Hematologic:   negative for easy bruising and gum/nose bleeding  Musculoskel:  negative for myalgias, arthralgias, back pain, muscle weakness, joint pain  Neurological:   negative for headaches, dizziness, vertigo, memory problems and gait   Behavl/Psych:  negative for feelings of anxiety, depression, mood changes  ROS otherwise negative      Objective:  Visit Vitals  BP (!) 148/74 (BP 1 Location: Left arm, BP Patient Position: Sitting)   Pulse (!) 59   Temp 97.7 °F (36.5 °C) (Oral)   Resp 16   Ht 5' 9\" (1.753 m)   Wt 144 lb 6.4 oz (65.5 kg)   SpO2 97%   BMI 21.32 kg/m²     Physical Exam:   General appearance - alert, well appearing, and in no distress  Mental status - alert, oriented to person, place, and time  EYE-YARY, EOMI, fundi normal, corneas normal, no foreign bodies  ENT-ENT exam normal, no neck nodes or sinus tenderness  Nose - normal and patent, no erythema, discharge or polyps  Mouth - mucous membranes moist, pharynx normal without lesions  Neck - supple, no significant adenopathy   Chest - clear to auscultation, no wheezes, rales or rhonchi, symmetric air entry   Heart - normal rate, regular rhythm, normal S1, S2, no murmurs, rubs, clicks or gallops   Abdomen - soft, nontender, nondistended, no masses or organomegaly  Lymph- no adenopathy palpable  Ext-peripheral pulses normal, 2+ bilateral pedal edema no clubbing or cyanosis  Skin-Warm and dry. no hyperpigmentation, vitiligo, or suspicious lesions  Neuro -alert, oriented, normal speech, no focal findings or movement disorder noted      Assessment/Plan:  Diagnoses and all orders for this visit:    1. Essential hypertension  -     METABOLIC PANEL, BASIC    2. Pedal edema  -     METABOLIC PANEL, BASIC    Other orders  -     furosemide (LASIX) 20 mg tablet; Take 1 Tab by mouth daily. ICD-10-CM ICD-9-CM    1. Essential hypertension  S00 667.4 METABOLIC PANEL, BASIC   2. Pedal edema  F07.3 571.7 METABOLIC PANEL, BASIC     Plan:    Resume amlodipine 5 mg daily for hypertension. Add Lasix 20 mg daily for pedal edema and hypertension. Follow-up BMP to reassess renal function. There are no overt signs or symptoms of CHF.   Further recommendations based on response to therapy. The patient is also instructed to elevate his legs when sitting at home. I have reviewed with the patient details of the assessment and plan and all questions were answered. Relevent patient education was performed. Verbal and/or written instructions (see AVS) provided. The most recent lab findings were reviewed with the patient. Plan was discussed with patient who verbally expressed understanding. An After Visit Summary was printed and given to the patient.     Gus Peng MD

## 2021-01-12 NOTE — PROGRESS NOTES
HIPAA verified by two patient identifiers. Merly Tello Sr. is a 80 y.o. male    Chief Complaint   Patient presents with    Foot Swelling     both    Ankle swelling       Visit Vitals  BP (!) 160/70 (BP 1 Location: Left arm, BP Patient Position: Sitting)   Pulse (!) 59   Temp 97.7 °F (36.5 °C) (Oral)   Resp 16   Ht 5' 9\" (1.753 m)   Wt 144 lb 6.4 oz (65.5 kg)   SpO2 97%   BMI 21.32 kg/m²       Pain Scale: 0 - No pain/10  Pain Location:       Health Maintenance Due   Topic Date Due    DTaP/Tdap/Td series (1 - Tdap) 05/03/1955    Shingrix Vaccine Age 50> (1 of 2) 05/03/1984    GLAUCOMA SCREENING Q2Y  05/03/1999    Medicare Yearly Exam  09/25/2020         Coordination of Care Questionnaire:  :   1) Have you been to an emergency room, urgent care, or hospitalized since your last visit? If yes, where when, and reason for visit? no       2. Have seen or consulted any other health care provider since your last visit? If yes, where when, and reason for visit? NO      Patient is accompanied by daughter I have received verbal consent from Abigail Saravia to discuss any/all medical information while they are present in the room.

## 2021-01-15 ENCOUNTER — TELEPHONE (OUTPATIENT)
Dept: INTERNAL MEDICINE CLINIC | Age: 86
End: 2021-01-15

## 2021-01-18 NOTE — PROGRESS NOTES
Patient's daughter, Jose Sen informed of patient's lab results.   States understanding and has no questions at this time

## 2021-01-18 NOTE — PROGRESS NOTES
Follow-up labs are all stable. Glucose is normal. Kidney function is normal. Electrolytes are stable. BUN which is an indirect measure of kidney function may indicate that he was a little on the dry side when his labs were done. Encourage intake of fluids.

## 2021-02-07 DIAGNOSIS — I69.30 LATE EFFECT OF STROKE: ICD-10-CM

## 2021-02-07 DIAGNOSIS — R56.9 CONVULSIONS, UNSPECIFIED CONVULSION TYPE (HCC): ICD-10-CM

## 2021-02-07 DIAGNOSIS — R41.3 MEMORY LOSS: ICD-10-CM

## 2021-02-07 DIAGNOSIS — I63.312 THROMBOTIC STROKE INVOLVING LEFT MIDDLE CEREBRAL ARTERY (HCC): ICD-10-CM

## 2021-02-07 DIAGNOSIS — Z86.73 HISTORY OF CVA (CEREBROVASCULAR ACCIDENT): ICD-10-CM

## 2021-02-07 DIAGNOSIS — E53.8 B12 DEFICIENCY: ICD-10-CM

## 2021-02-07 DIAGNOSIS — R27.0 ATAXIA: ICD-10-CM

## 2021-02-07 DIAGNOSIS — F02.80 DEMENTIA OF THE ALZHEIMER'S TYPE WITH LATE ONSET WITHOUT BEHAVIORAL DISTURBANCE (HCC): ICD-10-CM

## 2021-02-07 DIAGNOSIS — G20 PARKINSONS DISEASE (HCC): ICD-10-CM

## 2021-02-07 DIAGNOSIS — G30.1 DEMENTIA OF THE ALZHEIMER'S TYPE WITH LATE ONSET WITHOUT BEHAVIORAL DISTURBANCE (HCC): ICD-10-CM

## 2021-02-07 DIAGNOSIS — G40.209 COMPLEX PARTIAL SEIZURE EVOLVING TO GENERALIZED SEIZURE (HCC): ICD-10-CM

## 2021-02-07 DIAGNOSIS — I65.23 STENOSIS OF BOTH CAROTID ARTERIES WITHOUT CEREBRAL INFARCTION: ICD-10-CM

## 2021-02-07 DIAGNOSIS — I65.23 ASYMPTOMATIC CAROTID ARTERY STENOSIS, BILATERAL: ICD-10-CM

## 2021-02-07 DIAGNOSIS — R55 VASOVAGAL SYNCOPE: ICD-10-CM

## 2021-02-07 DIAGNOSIS — G20 PARKINSON DISEASE (HCC): ICD-10-CM

## 2021-02-07 RX ORDER — ASPIRIN AND DIPYRIDAMOLE 25; 200 MG/1; MG/1
CAPSULE, EXTENDED RELEASE ORAL
Qty: 180 CAP | Refills: 4 | Status: SHIPPED | OUTPATIENT
Start: 2021-02-07 | End: 2022-03-20

## 2021-03-24 RX ORDER — PRAVASTATIN SODIUM 40 MG/1
TABLET ORAL
Qty: 90 TAB | Refills: 3 | Status: SHIPPED | OUTPATIENT
Start: 2021-03-24 | End: 2022-04-04

## 2021-03-25 RX ORDER — TAMSULOSIN HYDROCHLORIDE 0.4 MG/1
CAPSULE ORAL
Qty: 90 CAP | Refills: 3 | Status: SHIPPED | OUTPATIENT
Start: 2021-03-25 | End: 2022-04-04

## 2021-04-05 RX ORDER — FUROSEMIDE 20 MG/1
TABLET ORAL
Qty: 90 TAB | Refills: 1 | Status: SHIPPED | OUTPATIENT
Start: 2021-04-05 | End: 2021-10-06

## 2021-04-09 DIAGNOSIS — I65.23 ASYMPTOMATIC CAROTID ARTERY STENOSIS, BILATERAL: ICD-10-CM

## 2021-04-09 DIAGNOSIS — F02.80 DEMENTIA OF THE ALZHEIMER'S TYPE WITH LATE ONSET WITHOUT BEHAVIORAL DISTURBANCE (HCC): ICD-10-CM

## 2021-04-09 DIAGNOSIS — R41.3 MEMORY LOSS: ICD-10-CM

## 2021-04-09 DIAGNOSIS — R27.0 ATAXIA: ICD-10-CM

## 2021-04-09 DIAGNOSIS — I63.312 THROMBOTIC STROKE INVOLVING LEFT MIDDLE CEREBRAL ARTERY (HCC): ICD-10-CM

## 2021-04-09 DIAGNOSIS — R56.9 CONVULSIONS, UNSPECIFIED CONVULSION TYPE (HCC): ICD-10-CM

## 2021-04-09 DIAGNOSIS — I65.23 STENOSIS OF BOTH CAROTID ARTERIES WITHOUT CEREBRAL INFARCTION: ICD-10-CM

## 2021-04-09 DIAGNOSIS — R55 VASOVAGAL SYNCOPE: ICD-10-CM

## 2021-04-09 DIAGNOSIS — G20 PARKINSON DISEASE (HCC): ICD-10-CM

## 2021-04-09 DIAGNOSIS — G30.1 DEMENTIA OF THE ALZHEIMER'S TYPE WITH LATE ONSET WITHOUT BEHAVIORAL DISTURBANCE (HCC): ICD-10-CM

## 2021-04-09 DIAGNOSIS — Z86.73 HISTORY OF CVA (CEREBROVASCULAR ACCIDENT): ICD-10-CM

## 2021-04-09 DIAGNOSIS — E53.8 B12 DEFICIENCY: ICD-10-CM

## 2021-04-09 DIAGNOSIS — G40.209 COMPLEX PARTIAL SEIZURE EVOLVING TO GENERALIZED SEIZURE (HCC): ICD-10-CM

## 2021-04-09 DIAGNOSIS — I69.30 LATE EFFECT OF STROKE: ICD-10-CM

## 2021-04-09 RX ORDER — RASAGILINE 1 MG/1
TABLET ORAL
Qty: 90 TAB | Refills: 5 | Status: SHIPPED | OUTPATIENT
Start: 2021-04-09 | End: 2022-06-19

## 2021-05-13 ENCOUNTER — OFFICE VISIT (OUTPATIENT)
Dept: INTERNAL MEDICINE CLINIC | Age: 86
End: 2021-05-13
Payer: MEDICARE

## 2021-05-13 VITALS
SYSTOLIC BLOOD PRESSURE: 124 MMHG | OXYGEN SATURATION: 98 % | TEMPERATURE: 98.2 F | BODY MASS INDEX: 21.32 KG/M2 | DIASTOLIC BLOOD PRESSURE: 60 MMHG | HEIGHT: 69 IN | HEART RATE: 82 BPM | RESPIRATION RATE: 16 BRPM

## 2021-05-13 DIAGNOSIS — G20 PARKINSONS DISEASE (HCC): ICD-10-CM

## 2021-05-13 DIAGNOSIS — C61 PROSTATE CANCER (HCC): ICD-10-CM

## 2021-05-13 DIAGNOSIS — M48.02 CERVICAL SPINAL STENOSIS: Primary | ICD-10-CM

## 2021-05-13 DIAGNOSIS — E72.11 HYPERHOMOCYSTEINEMIA (HCC): ICD-10-CM

## 2021-05-13 DIAGNOSIS — G40.209 COMPLEX PARTIAL SEIZURE EVOLVING TO GENERALIZED SEIZURE (HCC): ICD-10-CM

## 2021-05-13 PROCEDURE — 99213 OFFICE O/P EST LOW 20 MIN: CPT | Performed by: INTERNAL MEDICINE

## 2021-05-13 PROCEDURE — G8427 DOCREV CUR MEDS BY ELIG CLIN: HCPCS | Performed by: INTERNAL MEDICINE

## 2021-05-13 PROCEDURE — G8432 DEP SCR NOT DOC, RNG: HCPCS | Performed by: INTERNAL MEDICINE

## 2021-05-13 PROCEDURE — 3288F FALL RISK ASSESSMENT DOCD: CPT | Performed by: INTERNAL MEDICINE

## 2021-05-13 PROCEDURE — G8536 NO DOC ELDER MAL SCRN: HCPCS | Performed by: INTERNAL MEDICINE

## 2021-05-13 PROCEDURE — G8420 CALC BMI NORM PARAMETERS: HCPCS | Performed by: INTERNAL MEDICINE

## 2021-05-13 PROCEDURE — 1100F PTFALLS ASSESS-DOCD GE2>/YR: CPT | Performed by: INTERNAL MEDICINE

## 2021-05-13 RX ORDER — PREDNISONE 10 MG/1
TABLET ORAL
Qty: 165 TAB | Refills: 0 | Status: SHIPPED | OUTPATIENT
Start: 2021-05-13 | End: 2021-06-10

## 2021-05-13 NOTE — PROGRESS NOTES
Pino Tarango presents today at the clinic for    Chief Complaint   Patient presents with    Shoulder Pain     pain and weakness in both shoulders x 2 weeks        Wt Readings from Last 3 Encounters:   01/12/21 144 lb 6.4 oz (65.5 kg)   12/17/20 142 lb (64.4 kg)   06/24/20 139 lb (63 kg)     Temp Readings from Last 3 Encounters:   05/13/21 98.2 °F (36.8 °C) (Temporal)   01/12/21 97.7 °F (36.5 °C) (Oral)   12/17/20 98.1 °F (36.7 °C) (Oral)     BP Readings from Last 3 Encounters:   05/13/21 124/60   01/12/21 (!) 148/74   12/17/20 130/72     Pulse Readings from Last 3 Encounters:   05/13/21 82   01/12/21 (!) 59   12/17/20 80       Health Maintenance Due   Topic    DTaP/Tdap/Td series (1 - Tdap)    Shingrix Vaccine Age 49> (1 of 2)    Medicare Yearly Exam     COVID-19 Vaccine (2 - Pfizer 2-dose series)         Learning Assessment:  :     Learning Assessment 3/22/2018 9/6/2017 4/19/2016   PRIMARY LEARNER Patient Patient Patient   HIGHEST LEVEL OF EDUCATION - PRIMARY LEARNER  - DID NOT GRADUATE HIGH SCHOOL -   BARRIERS PRIMARY LEARNER - NONE -   CO-LEARNER CAREGIVER - No -   PRIMARY LANGUAGE ENGLISH ENGLISH ENGLISH   LEARNER PREFERENCE PRIMARY DEMONSTRATION READING DEMONSTRATION   ANSWERED BY patient patient patient   RELATIONSHIP SELF SELF SELF       Depression Screening:  :     3 most recent PHQ Screens 1/12/2021   PHQ Not Done -   Little interest or pleasure in doing things Not at all   Feeling down, depressed, irritable, or hopeless Not at all   Total Score PHQ 2 0       Fall Risk Assessment:  :     Fall Risk Assessment, last 12 mths 1/12/2021   Able to walk? Yes   Fall in past 12 months? 1   Do you feel unsteady? 1   Are you worried about falling 1   Is the gait abnormal? 1   Number of falls in past 12 months 2   Fall with injury? 0       Abuse Screening:  :     Abuse Screening Questionnaire 3/25/2019   Do you ever feel afraid of your partner?  N   Are you in a relationship with someone who physically or mentally threatens you? N   Is it safe for you to go home? Y       Coordination of Care Questionnaire:  :     1. Have you been to the ER, urgent care clinic since your last visit? Hospitalized since your last visit? no    2. Have you seen or consulted any other health care providers outside of the [address] [place] [address] since your last visit? Include any pap smears or colon screening.  no

## 2021-05-13 NOTE — PROGRESS NOTES
This note will not be viewable in [address] Slime Donauhe Sr. is a 80 y.o. male and presents with Shoulder Pain (pain and weakness in both shoulders x 2 weeks)  . Subjective:  Mr. Deneen Mohan presents today with complaint of pain and weakness of his upper extremities and lower extremities that has been present for the past few weeks. He does have Parkinson's disease and has significant loss of strength and coordination in his lower extremities. He has a shuffling gait and needs assistance with ambulation. However he has noted increased loss of strength in his shoulders and is unable to push himself up. Because he has some pain associated with this he presented today for further evaluation. His daughter notes that she has been able to give him over-the-counter Tylenol and use Voltaren gel with some relief but his symptoms have persisted. He does have a remote history of cervical spinal stenosis which is likely contributing factor. Past Medical History:   Diagnosis Date    Arthritis of ankle 8/16/2017    Cancer [facility])     Prostate.  Cellulitis 8/16/2017    Cerebrovascular disease 8/16/2017    Cervical spinal stenosis 5/13/2021    Detrusor dysfunction 8/16/2017    Edema 8/16/2017    Elevated cholesterol 8/16/2017    Fatigue 8/16/2017    Gastrointestinal disorder     ulcer    Hypertension     On statin therapy 8/16/2017    Parkinsons disease ([address] 8/16/2017    Pernicious anemia 8/16/2017    Prostate cancer [address] [address] 8/16/2017    Raynaud disease 8/16/2017    Seizure disorder ([facility] [address] 8/16/2017    Tobacco abuse 8/16/2017    Weight loss 8/16/2017     Past Surgical History:   Procedure Laterality Date    HX PROSTATECTOMY      [place] ABDOMEN SURGERY PROC UNLISTED      Ulcer.       Allergies   Allergen Reactions    Pcn [Penicillins] Swelling     Current Outpatient Medications   Medication Sig Dispense Refill    predniSONE (DELTASONE) 10 mg tablet Take 6 tablets daily for 1 week, then 4 tablets daily for 1 week, then 3 tablets daily for 1 week, then 2 tablets daily for 1 week, then 1 tablet daily 165 Tab 0    rasagiline (AZILECT) 1 mg tablet TAKE 1 TABLET BY MOUTH EVERY DAY 90 Tab 5    furosemide (LASIX) 20 mg tablet TAKE 1 TABLET BY MOUTH EVERY DAY 90 Tab 1    tamsulosin (FLOMAX) 0.4 mg capsule TAKE 1 CAPSULE BY MOUTH EVERY DAY 90 Cap 3    pravastatin (PRAVACHOL) 40 mg tablet TAKE 1 TABLET BY MOUTH EVERY DAY 90 Tab 3    aspirin-dipyridamole (AGGRENOX)  mg per SR capsule TAKE 1 CAPSULE BY MOUTH TWICE A  Cap 4    Myrbetriq 25 mg ER tablet TAKE 1 TABLET BY MOUTH EVERY DAY 90 Tab 3    amLODIPine (NORVASC) 5 mg tablet TAKE 1 TABLET BY MOUTH EVERY DAY 90 Tab 2    zonisamide (ZONEGRAN) 100 mg capsule TAKE 1 CAPSULE BY MOUTH EVERY DAY 90 Cap 3    carbidopa-levodopa (SINEMET)  mg per tablet TAKE 2 TABS BY MOUTH THREE (3) TIMES DAILY. 540 Tab 2    levETIRAcetam (KEPPRA) 500 mg tablet TAKE 1 TABLET BY MOUTH EVERY MORNING AND 2 AT BEDTIME 270 Tab 4    multivit with minerals/lutein (MULTIVITAMIN 50 PLUS PO) Take 1 Tab by mouth daily.        Social History     Socioeconomic History    Marital status: SINGLE     Spouse name: Not on file    Number of children: Not on file    Years of education: Not on file    Highest education level: Not on file   Tobacco Use    Smoking status: Current Every Day Smoker     Packs/day: 0.50    Smokeless tobacco: Never Used   Substance and Sexual Activity    Alcohol use: No    Drug use: No    Sexual activity: Not Currently     Family History   Problem Relation Age of Onset    Dementia Mother     Stroke Father     Heart Disease Sister     Hypertension Sister     Hypertension Brother     Arthritis-osteo Brother     Heart Disease Brother     COPD Brother     Hypertension Child        Review of Systems  Constitutional:  negative for fevers, chills, anorexia and weight loss  Eyes:    negative for visual disturbance and irritation  ENT:    negative for tinnitus,sore throat,nasal congestion,ear pains. hoarseness  Respiratory:     negative for cough, hemoptysis, dyspnea,wheezing  CV:    negative for chest pain, palpitations, lower extremity edema  GI:    negative for nausea, vomiting, diarrhea, abdominal pain,melena  Endo:               negative for polyuria,polydipsia,polyphagia,heat intolerance  Genitourinary : negative for frequency, dysuria and hematuria  Integumentary: negative for rash and pruritus  Hematologic:   negative for easy bruising and gum/nose bleeding  Musculoskel:  negative for myalgias, arthralgias, back pain, muscle weakness, joint pain  Neurological:   negative for headaches, dizziness, vertigo, memory problems and gait   Behavl/Psych:  negative for feelings of anxiety, depression, mood changes  ROS otherwise negative      Objective:  Visit Vitals  /60 (BP 1 Location: Left upper arm, BP Patient Position: Sitting, BP Cuff Size: Adult)   Pulse 82   Temp 98.2 °F (36.8 °C) (Temporal)   Resp 16   Ht 5' 9\" (1.753 m)   SpO2 98%   BMI 21.32 kg/m²     Physical Exam:   General appearance - alert, well appearing, and in no distress  Mental status - alert, oriented to person, place, and time  EYE-YARY, EOMI, fundi normal, corneas normal, no foreign bodies  ENT-ENT exam normal, no neck nodes or sinus tenderness  Nose - normal and patent, no erythema, discharge or polyps  Mouth - mucous membranes moist, pharynx normal without lesions  Neck - supple, no significant adenopathy   Chest - clear to auscultation, no wheezes, rales or rhonchi, symmetric air entry   Heart - normal rate, regular rhythm, normal S1, S2, no murmurs, rubs, clicks or gallops   Abdomen - soft, nontender, nondistended, no masses or organomegaly  Lymph- no adenopathy palpable  Ext-peripheral pulses normal, no pedal edema, no clubbing or cyanosis  Skin-Warm and dry.  no hyperpigmentation, vitiligo, or suspicious lesions  Neuro -alert, oriented, normal speech, no focal findings or movement disorder noted      Assessment/Plan:  Diagnoses and all orders for this visit:    1. Cervical spinal stenosis    2. Parkinsons disease ([facility] [address]    3. 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 Hyperhomocysteinemia ([facility] [address]    5. [facility]state [facility] ([facility]    Other orders  -     predniSONE (DELTASONE) 10 mg tablet; Take 6 tablets daily for 1 week, then 4 tablets daily for 1 week, then 3 tablets daily for 1 week, then 2 tablets daily for 1 week, then 1 tablet daily          ICD-10-CM ICD-9-CM    1. Cervical spinal stenosis  M48.02 723.0    2. Parkinsons disease ([facility] [address]  G20 332.0    3. 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 Hyperhomocysteinemia ([facility])  E72.11 270.4    5. Prostate cancer [facility])  [id number]      Plan:    Patient had previous MRI which shows significant spinal stenosis and arthritis of the cervical spine. This is complicated by the fact that he also has a history of Parkinson's disease as well as cerebrovascular disease that may be contributing factor. Differential also includes polymyalgia rheumatica. We will place him on a steroid taper to see how he responds. I have reviewed with the patient details of the assessment and plan and all questions were answered. Relevent patient education was performed. Verbal and/or written instructions (see AVS) provided. The most recent lab findings were reviewed with the patient. Plan was discussed with patient who verbally expressed understanding. An After Visit Summary was printed and given to the patient.     Tara Rand MD

## 2021-05-29 DIAGNOSIS — G30.1 DEMENTIA OF THE ALZHEIMER'S TYPE WITH LATE ONSET WITHOUT BEHAVIORAL DISTURBANCE (HCC): ICD-10-CM

## 2021-05-29 DIAGNOSIS — G20 PARKINSON DISEASE (HCC): ICD-10-CM

## 2021-05-29 DIAGNOSIS — I65.23 ASYMPTOMATIC CAROTID ARTERY STENOSIS, BILATERAL: ICD-10-CM

## 2021-05-29 DIAGNOSIS — E53.8 B12 DEFICIENCY: ICD-10-CM

## 2021-05-29 DIAGNOSIS — Z86.73 HISTORY OF CVA (CEREBROVASCULAR ACCIDENT): ICD-10-CM

## 2021-05-29 DIAGNOSIS — R56.9 CONVULSIONS, UNSPECIFIED CONVULSION TYPE (HCC): ICD-10-CM

## 2021-05-29 DIAGNOSIS — I69.30 LATE EFFECT OF STROKE: ICD-10-CM

## 2021-05-29 DIAGNOSIS — G40.209 COMPLEX PARTIAL SEIZURE EVOLVING TO GENERALIZED SEIZURE (HCC): ICD-10-CM

## 2021-05-29 DIAGNOSIS — F02.80 DEMENTIA OF THE ALZHEIMER'S TYPE WITH LATE ONSET WITHOUT BEHAVIORAL DISTURBANCE (HCC): ICD-10-CM

## 2021-05-29 DIAGNOSIS — R55 VASOVAGAL SYNCOPE: ICD-10-CM

## 2021-05-29 DIAGNOSIS — R41.3 MEMORY LOSS: ICD-10-CM

## 2021-05-29 DIAGNOSIS — I65.23 STENOSIS OF BOTH CAROTID ARTERIES WITHOUT CEREBRAL INFARCTION: ICD-10-CM

## 2021-05-29 DIAGNOSIS — I63.312 THROMBOTIC STROKE INVOLVING LEFT MIDDLE CEREBRAL ARTERY (HCC): ICD-10-CM

## 2021-05-29 DIAGNOSIS — R27.0 ATAXIA: ICD-10-CM

## 2021-05-31 RX ORDER — ZONISAMIDE 100 MG/1
CAPSULE ORAL
Qty: 90 CAPSULE | Refills: 3 | Status: SHIPPED | OUTPATIENT
Start: 2021-05-31 | End: 2022-06-18

## 2021-06-10 RX ORDER — PREDNISONE 10 MG/1
TABLET ORAL
Qty: 165 TABLET | Refills: 0 | Status: SHIPPED | OUTPATIENT
Start: 2021-06-10 | End: 2021-10-06

## 2021-06-22 ENCOUNTER — OFFICE VISIT (OUTPATIENT)
Dept: INTERNAL MEDICINE CLINIC | Age: 86
End: 2021-06-22
Payer: MEDICARE

## 2021-06-22 VITALS
DIASTOLIC BLOOD PRESSURE: 57 MMHG | TEMPERATURE: 99.3 F | OXYGEN SATURATION: 98 % | HEIGHT: 69 IN | BODY MASS INDEX: 21.33 KG/M2 | RESPIRATION RATE: 18 BRPM | SYSTOLIC BLOOD PRESSURE: 147 MMHG | HEART RATE: 68 BPM | WEIGHT: 144 LBS

## 2021-06-22 DIAGNOSIS — Z13.31 SCREENING FOR DEPRESSION: ICD-10-CM

## 2021-06-22 DIAGNOSIS — G40.909 SEIZURE DISORDER (HCC): ICD-10-CM

## 2021-06-22 DIAGNOSIS — Z79.899 ON STATIN THERAPY: ICD-10-CM

## 2021-06-22 DIAGNOSIS — I10 ESSENTIAL HYPERTENSION: Chronic | ICD-10-CM

## 2021-06-22 DIAGNOSIS — D51.0 PERNICIOUS ANEMIA: ICD-10-CM

## 2021-06-22 DIAGNOSIS — F02.80 DEMENTIA OF THE ALZHEIMER'S TYPE WITH LATE ONSET WITHOUT BEHAVIORAL DISTURBANCE (HCC): ICD-10-CM

## 2021-06-22 DIAGNOSIS — G20 PARKINSON DISEASE (HCC): ICD-10-CM

## 2021-06-22 DIAGNOSIS — Z13.6 SCREENING FOR ISCHEMIC HEART DISEASE: ICD-10-CM

## 2021-06-22 DIAGNOSIS — N31.9 NEUROGENIC BLADDER: ICD-10-CM

## 2021-06-22 DIAGNOSIS — R27.0 ATAXIA: ICD-10-CM

## 2021-06-22 DIAGNOSIS — Z72.0 TOBACCO ABUSE: ICD-10-CM

## 2021-06-22 DIAGNOSIS — M48.02 CERVICAL SPINAL STENOSIS: ICD-10-CM

## 2021-06-22 DIAGNOSIS — E78.00 ELEVATED CHOLESTEROL: ICD-10-CM

## 2021-06-22 DIAGNOSIS — Z00.00 MEDICARE ANNUAL WELLNESS VISIT, SUBSEQUENT: Primary | ICD-10-CM

## 2021-06-22 DIAGNOSIS — I67.9 CEREBROVASCULAR DISEASE: ICD-10-CM

## 2021-06-22 DIAGNOSIS — M35.3 PMR (POLYMYALGIA RHEUMATICA) (HCC): ICD-10-CM

## 2021-06-22 DIAGNOSIS — Z13.1 SCREENING FOR DIABETES MELLITUS: ICD-10-CM

## 2021-06-22 DIAGNOSIS — G30.1 DEMENTIA OF THE ALZHEIMER'S TYPE WITH LATE ONSET WITHOUT BEHAVIORAL DISTURBANCE (HCC): ICD-10-CM

## 2021-06-22 PROCEDURE — 1100F PTFALLS ASSESS-DOCD GE2>/YR: CPT | Performed by: INTERNAL MEDICINE

## 2021-06-22 PROCEDURE — G8427 DOCREV CUR MEDS BY ELIG CLIN: HCPCS | Performed by: INTERNAL MEDICINE

## 2021-06-22 PROCEDURE — G8536 NO DOC ELDER MAL SCRN: HCPCS | Performed by: INTERNAL MEDICINE

## 2021-06-22 PROCEDURE — G8432 DEP SCR NOT DOC, RNG: HCPCS | Performed by: INTERNAL MEDICINE

## 2021-06-22 PROCEDURE — 3288F FALL RISK ASSESSMENT DOCD: CPT | Performed by: INTERNAL MEDICINE

## 2021-06-22 PROCEDURE — G8420 CALC BMI NORM PARAMETERS: HCPCS | Performed by: INTERNAL MEDICINE

## 2021-06-22 PROCEDURE — G0439 PPPS, SUBSEQ VISIT: HCPCS | Performed by: INTERNAL MEDICINE

## 2021-06-22 PROCEDURE — 99214 OFFICE O/P EST MOD 30 MIN: CPT | Performed by: INTERNAL MEDICINE

## 2021-06-22 NOTE — PROGRESS NOTES
This is the Subsequent Medicare Annual Wellness Exam, performed 12 months or more after the Initial AWV or the last Subsequent AWV    I have reviewed the patient's medical history in detail and updated the computerized patient record. Assessment/Plan   Education and counseling provided:  Are appropriate based on today's review and evaluation    1. Medicare annual wellness visit, subsequent  2. Essential hypertension  -     METABOLIC PANEL, COMPREHENSIVE; Future  3. Cerebrovascular disease  4. Parkinson disease ([facility]  5. Dementia of the Alzheimer's type with late onset without behavioral disturbance ([facility] [facility]  6. Seizure disorder (HCC)  -     LEVETIRACETAM (KEPPRA); Future  7. Neurogenic bladder  8. Ataxia  9. Elevated cholesterol  -     CK; Future  -     LIPID PANEL; Future  10. On statin therapy  -     CK; Future  -     LIPID PANEL; Future  -     METABOLIC PANEL, COMPREHENSIVE; Future  11. Pernicious anemia  12. Tobacco abuse  13. Cervical spinal stenosis  14. PMR (polymyalgia rheumatica) ([facility])  -     SED RATE (ESR); Future  15. Screening for depression  -     [address]  16. Screening for diabetes mellitus  17. Screening for ischemic heart disease       Depression Risk Factor Screening     3 most recent PHQ Screens 1/12/2021   PHQ Not Done -   Little interest or pleasure in doing things Not at all   Feeling down, depressed, irritable, or hopeless Not at all   Total Score PHQ 2 0       Alcohol Risk Screen    Do you average more than 1 drink per night or more than 7 drinks a week: No    In the past three months have you have had more than 4 drinks containing alcohol on one occasion: No        Functional Ability and Level of Safety    Hearing: Hearing is good. Activities of Daily Living:   The home contains: handrails and grab bars  Patient needs help with:  phone, transportation, shopping, preparing meals, laundry, housework, managing medications, managing money, eating, dressing, bathing, hygiene, bathroom needs and walking      Ambulation: with difficulty, uses a walker and Wheelchair     Fall Risk:  Fall Risk Assessment, last 12 mths 1/12/2021   Able to walk? Yes   Fall in past 12 months? 1   Do you feel unsteady? 1   Are you worried about falling 1   Is the gait abnormal? 1   Number of falls in past 12 months 2   Fall with injury?  0      Abuse Screen:  Patient is not abused       Cognitive Screening    Has your family/caregiver stated any concerns about your memory: no     Cognitive Screening: Normal - Verbal Fluency Test    Health Maintenance Due     Health Maintenance Due   Topic Date Due    DTaP/Tdap/Td series (1 - Tdap) Never done    Shingrix Vaccine Age 49> (1 of 2) Never done    Medicare Yearly Exam  09/25/2020    Lipid Screen  06/24/2021       Patient Care Team   Patient Care Team:  Елена Corrales MD as PCP - General (Internal Medicine)  Елен[facility] Corrales [facility]  Kenia Heck MD (Vascular Surgery)    History     Patient Active Problem List   Diagnosis Code    History of CVA (cerebrovascular accident) X63.71    Prostate hypertrophy N40.0    Parkinson disease ([address] G20    B12 deficiency E53.8    Hyperhomocysteinemia ([address] E72.11    Late effect of stroke I69.30    Ataxia R27.0    Neurogenic bladder N31.9    Anemia, unspecified D64.9    Memory loss R41.3    Syncope R55    Dementia of the Alzheimer's type with late onset without behavioral disturbance ([address] G30.1, F02.80    Stenosis of both carotid arteries without cerebral infarction I65.23    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    Cellulitis L03.90    Cerebrovascular disease I67.9    Detrusor dysfunction N31.8    Edema R60.9    Elevated cholesterol E78.00    Fatigue R53.83    On statin therapy Z79.899    Parkinsons disease ([address] G20    Pernicious anemia D51.0    Prostate cancer ([address] C61    Raynaud disease I73.00    Seizure disorder ([address] G40.909    Tobacco abuse Z72.0    Weight loss R63.4    Essential hypertension I10    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  Cellulitis 8/16/2017    Cerebrovascular disease 8/16/2017    Cervical spinal stenosis 5/13/2021    Detrusor dysfunction 8/16/2017    Edema 8/16/2017    Elevated cholesterol 8/16/2017    Fatigue 8/16/2017    Gastrointestinal disorder     ulcer    Hypertension     On statin therapy 8/16/2017    Parkinsons disease ([facility] [address] 8/16/2017    Pernicious anemia 8/16/2017    Prostate cancer ([facility] [address] 8/16/2017    Raynaud disease 8/16/2017    Seizure disorder ([facility] [address] 8/16/2017    Tobacco abuse 8/16/2017    Weight loss 8/16/2017      Past Surgical History:   Procedure Laterality Date    HX PROSTATECTOMY      DE ABDOMEN SURGERY PROC UNLISTED      Ulcer. Current Outpatient Medications   Medication Sig Dispense Refill    zonisamide (ZONEGRAN) 100 mg capsule TAKE 1 CAPSULE BY MOUTH EVERY DAY 90 Capsule 3    rasagiline (AZILECT) 1 mg tablet TAKE 1 TABLET BY MOUTH EVERY DAY 90 Tab 5    furosemide (LASIX) 20 mg tablet TAKE 1 TABLET BY MOUTH EVERY DAY 90 Tab 1    tamsulosin (FLOMAX) 0.4 mg capsule TAKE 1 CAPSULE BY MOUTH EVERY DAY 90 Cap 3    pravastatin (PRAVACHOL) 40 mg tablet TAKE 1 TABLET BY MOUTH EVERY DAY 90 Tab 3    aspirin-dipyridamole (AGGRENOX)  mg per SR capsule TAKE 1 CAPSULE BY MOUTH TWICE A  Cap 4    Myrbetriq 25 mg ER tablet TAKE 1 TABLET BY MOUTH EVERY DAY 90 Tab 3    amLODIPine (NORVASC) 5 mg tablet TAKE 1 TABLET BY MOUTH EVERY DAY 90 Tab 2    carbidopa-levodopa (SINEMET)  mg per tablet TAKE 2 TABS BY MOUTH THREE (3) TIMES DAILY.  540 Tab 2    levETIRAcetam (KEPPRA) 500 mg tablet TAKE 1 TABLET BY MOUTH EVERY MORNING AND 2 AT BEDTIME 270 Tab 4  multivit with minerals/lutein (MULTIVITAMIN 50 PLUS PO) Take 1 Tab by mouth daily.  predniSONE (DELTASONE) 10 mg tablet TAKE 6 TABLETS DAILY FOR 1 WEEK, THEN 4 TABLETS DAILY FOR 1 WEEK, THEN 3 TABLETS DAILY FOR 1 WEEK, THEN 2 TABLETS DAILY FOR 1 WEEK, THEN 1 TABLET DAILY (Patient not taking: Reported on 6/22/2021) 165 Tablet 0     Allergies   Allergen Reactions    Pcn [Penicillins] Swelling       Family History   Problem Relation Age of Onset    Dementia Mother     Stroke Father     Heart Disease Sister     Hypertension Sister     Hypertension Brother     Arthritis-osteo Brother     Heart Disease Brother     COPD Brother     Hypertension Child      Social History     Tobacco Use    Smoking status: Current Every Day Smoker     Packs/day: 0.50    Smokeless tobacco: Never Used   Substance Use Topics    Alcohol use: No         C MD Celso Guzman Seminole SrLandon is a 80 y.o. male and presents with Follow-up (routine 6 month f/u)  . Subjective:  Mr. Humza Blackmon presents today for follow-up of several problems including a Medicare wellness visit. His history significant for prostate cancer, hyperlipidemia, monitoring statin therapy, Parkinson's disease, hypertension, seizure disorder, cervical spinal stenosis, detrusor instability, and likely polymyalgia rheumatica based on recent presentation with diffuse body aches and muscle weakness. He did respond to a steroid taper. He is due for a follow-up sed rate today. His daughter is present with him today. She notes improvement in his ability to move after starting on prednisone. He unfortunately developed lower extremity swelling but this improved as she tapered his prednisone down to 1 tablet daily. Past Medical History:   Diagnosis Date    Arthritis of ankle 8/16/2017    Cancer [facility])     Prostate.     Cellulitis 8/16/2017    Cerebrovascular disease 8/16/2017    Cervical spinal stenosis 5/13/2021    Detrusor dysfunction 8/16/2017    Edema 8/16/2017    Elevated cholesterol 8/16/2017    Fatigue 8/16/2017    Gastrointestinal disorder     ulcer    Hypertension     On statin therapy 8/16/2017    Parkinsons disease ([facility] 8/16/2017    Pernicious anemia 8/16/2017    Prostate cancer ([facility] 8/16/2017    Raynaud disease 8/16/2017    Seizure disorder ([facility] 8/16/2017    Tobacco abuse 8/16/2017    Weight loss 8/16/2017     Past Surgical History:   Procedure Laterality Date    HX PROSTATECTOMY      TX ABDOMEN SURGERY PROC UNLISTED      Ulcer. Allergies   Allergen Reactions    Pcn [Penicillins] Swelling     Current Outpatient Medications   Medication Sig Dispense Refill    zonisamide (ZONEGRAN) 100 mg capsule TAKE 1 CAPSULE BY MOUTH EVERY DAY 90 Capsule 3    rasagiline (AZILECT) 1 mg tablet TAKE 1 TABLET BY MOUTH EVERY DAY 90 Tab 5    furosemide (LASIX) 20 mg tablet TAKE 1 TABLET BY MOUTH EVERY DAY 90 Tab 1    tamsulosin (FLOMAX) 0.4 mg capsule TAKE 1 CAPSULE BY MOUTH EVERY DAY 90 Cap 3    pravastatin (PRAVACHOL) 40 mg tablet TAKE 1 TABLET BY MOUTH EVERY DAY 90 Tab 3    aspirin-dipyridamole (AGGRENOX)  mg per SR capsule TAKE 1 CAPSULE BY MOUTH TWICE A  Cap 4    Myrbetriq 25 mg ER tablet TAKE 1 TABLET BY MOUTH EVERY DAY 90 Tab 3    amLODIPine (NORVASC) 5 mg tablet TAKE 1 TABLET BY MOUTH EVERY DAY 90 Tab 2    carbidopa-levodopa (SINEMET)  mg per tablet TAKE 2 TABS BY MOUTH THREE (3) TIMES DAILY. 540 Tab 2    levETIRAcetam (KEPPRA) 500 mg tablet TAKE 1 TABLET BY MOUTH EVERY MORNING AND 2 AT BEDTIME 270 Tab 4    multivit with minerals/lutein (MULTIVITAMIN 50 PLUS PO) Take 1 Tab by mouth daily.       predniSONE (DELTASONE) 10 mg tablet TAKE 6 TABLETS DAILY FOR 1 WEEK, THEN 4 TABLETS DAILY FOR 1 WEEK, THEN 3 TABLETS DAILY FOR 1 WEEK, THEN 2 TABLETS DAILY FOR 1 WEEK, THEN 1 TABLET DAILY (Patient not taking: Reported on 6/22/2021) 165 Tablet 0     Social History     Socioeconomic History    Marital status: SINGLE     Spouse name: Not on file    Number of children: Not on file    Years of education: Not on file    Highest education level: Not on file   Tobacco Use    Smoking status: Current Every Day Smoker     Packs/day: 0.50    Smokeless tobacco: Never Used   Vaping Use    Vaping Use: Never used   Substance and Sexual Activity    Alcohol use: No    Drug use: No    Sexual activity: Not Currently     Social Determinants of Health     Financial Resource Strain:     Difficulty of Paying Living Expenses:    Food Insecurity:     Worried About Running Out of Food in the Last Year:     Ran Out of Food in the Last Year:    Transportation Needs:     Lack of Transportation (Medical):  Lack of Transportation (Non-Medical):    Physical Activity:     Days of Exercise per Week:     Minutes of Exercise per Session:    Stress:     Feeling of Stress :    Social Connections:     Frequency of Communication with Friends and Family:     Frequency of Social Gatherings with Friends and Family:     Attends Shinto Services:     Active Member of Clubs or Organizations:     Attends Club or Organization Meetings:     Marital Status:      Family History   Problem Relation Age of Onset    Dementia Mother     Stroke Father     Heart Disease Sister     Hypertension Sister     Hypertension Brother     Arthritis-osteo Brother     Heart Disease Brother     COPD Brother     Hypertension Child        Review of Systems  Constitutional:  negative for fevers, chills, anorexia and weight loss  Eyes:    negative for visual disturbance and irritation  ENT:    negative for tinnitus,sore throat,nasal congestion,ear pains. hoarseness  Respiratory:     negative for cough, hemoptysis, dyspnea,wheezing  CV:    negative for chest pain, palpitations, lower extremity edema  GI:    negative for nausea, vomiting, diarrhea, abdominal pain,melena  Endo:               negative for polyuria,polydipsia,polyphagia,heat intolerance  Genitourinary : negative for frequency, dysuria and hematuria  Integumentary: negative for rash and pruritus  Hematologic:   negative for easy bruising and gum/nose bleeding  Musculoskel:  negative for myalgias, arthralgias, back pain,   Neurological:   negative for headaches, dizziness, vertigo, memory problems and gait   Behavl/Psych:  negative for feelings of anxiety, depression, mood changes  ROS otherwise negative      Objective:  Visit Vitals  BP (!) 147/57 (BP 1 Location: Left arm, BP Patient Position: Sitting, BP Cuff Size: Large adult)   Pulse 68   Temp 99.3 °F (37.4 °C) (Oral)   Resp 18   Ht 5' 9\" (1.753 m)   Wt 144 lb (65.3 kg)   SpO2 98%   BMI 21.27 kg/m²     Physical Exam:   General appearance - alert, well appearing, and in no distress  Mental status - alert, oriented to person, place, and time  EYE-YARY, EOMI, fundi normal, corneas normal, no foreign bodies  ENT-ENT exam normal, no neck nodes or sinus tenderness  Nose - normal and patent, no erythema, discharge or polyps  Mouth - mucous membranes moist, pharynx normal without lesions  Neck - supple, no significant adenopathy   Chest - clear to auscultation, no wheezes, rales or rhonchi, symmetric air entry   Heart - normal rate, regular rhythm, normal S1, S2, no murmurs, rubs, clicks or gallops   Abdomen - soft, nontender, nondistended, no masses or organomegaly  Lymph- no adenopathy palpable  Ext-peripheral pulses normal, no pedal edema, no clubbing or cyanosis  Skin-Warm and dry. no hyperpigmentation, vitiligo, or suspicious lesions  Neuro -sitting in wheelchair, alert, oriented to person and place, dysarthric speech, rigidity and generalized weakness, no tremor, gait not tested      Assessment/Plan:  Diagnoses and all orders for this visit:    1. Medicare annual wellness visit, subsequent    2. Essential hypertension  -     METABOLIC PANEL, COMPREHENSIVE; Future    3. Cerebrovascular disease    4. Parkinson disease ([facility] [address]    5. Dementia of the Alzheimer's type with late onset without behavioral disturbance ([facility]    6. Seizure disorder ([facility])  -     LEVETIRACETAM (KEPPRA); Future    7. Neurogenic bladder    8. Ataxia    9. Elevated cholesterol  -     CK; Future  -     LIPID PANEL; Future    10. On statin therapy  -     CK; Future  -     LIPID PANEL; Future  -     METABOLIC PANEL, COMPREHENSIVE; Future    11. Pernicious anemia    12. Tobacco abuse    13. Cervical spinal stenosis    14. PMR (polymyalgia rheumatica) ([facility])  -     SED RATE (ESR); Future    15. Screening for depression  -     [address]    16. Screening for diabetes mellitus    17. Screening for ischemic heart disease          ICD-10-CM ICD-9-CM    1. Medicare annual wellness visit, subsequent  Z00.00 V70.0    2. Essential hypertension  C27 149.2 METABOLIC PANEL, COMPREHENSIVE   3. Cerebrovascular disease  I67.9 437.9    4. Parkinson disease ([facility]  G20 332.0    5. Dementia of the Alzheimer's type with late onset without behavioral disturbance ([facility]  G30.1 331.0     F02.80 294.10    6. Seizure disorder ([facility]  G40.909 345.90 LEVETIRACETAM (KEPPRA)   7. Neurogenic bladder  N31.9 596.54    8. Ataxia  R27.0 781.3    9. Elevated cholesterol  E78.00 272.0 CK      LIPID PANEL   10. On statin therapy  Z79.899 V58.69 CK      LIPID PANEL      METABOLIC PANEL, COMPREHENSIVE   11. Pernicious anemia  D51.0 281.0    12. Tobacco abuse  Z72.0 305.1    13. Cervical spinal stenosis  M48.02 723.0    14. PMR (polymyalgia rheumatica) ([facility])  M35.3 725 SED RATE (ESR)   15. Screening for depression  Z13.31 V79.0 [address]   16. Screening for diabetes mellitus  Z13.1 V77.1    16. Screening for ischemic heart disease  Z13.6 V81.0      Plan:    Problems as outlined above currently stable. Continue low-dose prednisone 10 mg daily. Follow-up sed rate. Further recommendations based on labs as ordered.         I have reviewed with the patient details of the assessment and plan and all questions were answered. Relevent patient education was performed. Verbal and/or written instructions (see AVS) provided. The most recent lab findings were reviewed with the patient. Plan was discussed with patient who verbally expressed understanding. An After Visit Summary was printed and given to the patient.     Melissa Ga MD

## 2021-06-22 NOTE — PROGRESS NOTES
1. Have you been to the ER, urgent care clinic since your last visit? Hospitalized since your last visit? No    2. Have you seen or consulted any other health care providers outside of the [address] [place] [address] since your last visit? Include any pap smears or colon screening.  No

## 2021-06-22 NOTE — PATIENT INSTRUCTIONS
Medicare Wellness Visit, Male The best way to live healthy is to have a lifestyle where you eat a well-balanced diet, exercise regularly, limit alcohol use, and quit all forms of tobacco/nicotine, if applicable. Regular preventive services are another way to keep healthy. Preventive services (vaccines, screening tests, monitoring & exams) can help personalize your care plan, which helps you manage your own care. Screening tests can find health problems at the earliest stages, when they are easiest to treat. [facility] follows the current, evidence-based guidelines published by the [facility]ine Anayeli ([facility]) when recommending preventive services for our patients. Because we follow these guidelines, sometimes recommendations change over time as research supports it. (For example, a prostate screening blood test is no longer routinely recommended for men with no symptoms). Of course, you and your doctor may decide to screen more often for some diseases, based on your risk and co-morbidities (chronic disease you are already diagnosed with). Preventive services for you include: - Medicare offers their members a free annual wellness visit, which is time for you and your primary care provider to discuss and plan for your preventive service needs. Take advantage of this benefit every year! 
-All adults over age 72 should receive the recommended pneumonia vaccines. Current USPSTF guidelines recommend a series of two vaccines for the best pneumonia protection.  
-All adults should have a flu vaccine yearly and tetanus vaccine every 10 years. 
-All adults age 48 and older should receive the shingles vaccines (series of two vaccines).       
-All adults age 38-68 who are overweight should have a diabetes screening test once every three years.  
-Other screening tests & preventive services for persons with diabetes include: an eye exam to screen for diabetic retinopathy, a kidney function test, a foot exam, and stricter control over your cholesterol.  
-Cardiovascular screening for adults with routine risk involves an electrocardiogram (ECG) at intervals determined by the provider.  
-Colorectal cancer screening should be done for adults age 54-65 with no increased risk factors for colorectal cancer. There are a number of acceptable methods of screening for this type of cancer. Each test has its own benefits and drawbacks. Discuss with your provider what is most appropriate for you during your annual wellness visit. The different tests include: colonoscopy (considered the best screening method), a fecal occult blood test, a fecal DNA test, and sigmoidoscopy. 
-All adults born between [facility] should be screened once for Hepatitis C. 
-An Abdominal Aortic Aneurysm (AAA) Screening is recommended for men age 73-68 who has ever smoked in their lifetime. Here is a list of your current Health Maintenance items (your personalized list of preventive services) with a due date: 
Health Maintenance Due Topic Date Due  
 DTaP/Tdap/Td  (1 - Tdap) Never done  Shingles Vaccine (1 of 2) Never done [facility] Annual Well Visit  09/25/2020  Cholesterol Test   06/24/2021

## 2021-06-23 LAB
ALBUMIN SERPL-MCNC: 3.4 G/DL (ref 3.5–5)
ALBUMIN/GLOB SERPL: 0.9 {RATIO} (ref 1.1–2.2)
ALP SERPL-CCNC: 102 U/L (ref 45–117)
ALT SERPL-CCNC: 99 U/L (ref 12–78)
ANION GAP SERPL CALC-SCNC: 5 MMOL/L (ref 5–15)
AST SERPL-CCNC: 74 U/L (ref 15–37)
BILIRUB SERPL-MCNC: 0.2 MG/DL (ref 0.2–1)
BUN SERPL-MCNC: 33 MG/DL (ref 6–20)
BUN/CREAT SERPL: 26 (ref 12–20)
CALCIUM SERPL-MCNC: 9.3 MG/DL (ref 8.5–10.1)
CHLORIDE SERPL-SCNC: 114 MMOL/L (ref 97–108)
CHOLEST SERPL-MCNC: 174 MG/DL
CK SERPL-CCNC: 53 U/L (ref 39–308)
CO2 SERPL-SCNC: 26 MMOL/L (ref 21–32)
CREAT SERPL-MCNC: 1.28 MG/DL (ref 0.7–1.3)
ERYTHROCYTE [SEDIMENTATION RATE] IN BLOOD: 44 MM/HR (ref 0–20)
GLOBULIN SER CALC-MCNC: 3.7 G/DL (ref 2–4)
GLUCOSE SERPL-MCNC: 96 MG/DL (ref 65–100)
HDLC SERPL-MCNC: 88 MG/DL
HDLC SERPL: 2 {RATIO} (ref 0–5)
LDLC SERPL CALC-MCNC: 67.8 MG/DL (ref 0–100)
POTASSIUM SERPL-SCNC: 5.6 MMOL/L (ref 3.5–5.1)
PROT SERPL-MCNC: 7.1 G/DL (ref 6.4–8.2)
SODIUM SERPL-SCNC: 145 MMOL/L (ref 136–145)
TRIGL SERPL-MCNC: 91 MG/DL (ref ?–150)
VLDLC SERPL CALC-MCNC: 18.2 MG/DL

## 2021-06-25 LAB — LEVETIRACETAM SERPL-MCNC: 26.1 UG/ML (ref 10–40)

## 2021-07-10 DIAGNOSIS — I65.23 STENOSIS OF BOTH CAROTID ARTERIES WITHOUT CEREBRAL INFARCTION: ICD-10-CM

## 2021-07-10 DIAGNOSIS — R27.0 ATAXIA: ICD-10-CM

## 2021-07-10 DIAGNOSIS — G20 PARKINSON DISEASE (HCC): ICD-10-CM

## 2021-07-10 DIAGNOSIS — I69.30 LATE EFFECT OF STROKE: ICD-10-CM

## 2021-07-10 DIAGNOSIS — R41.3 MEMORY LOSS: ICD-10-CM

## 2021-07-10 DIAGNOSIS — Z86.73 HISTORY OF CVA (CEREBROVASCULAR ACCIDENT): ICD-10-CM

## 2021-07-10 DIAGNOSIS — G40.209 COMPLEX PARTIAL SEIZURE EVOLVING TO GENERALIZED SEIZURE (HCC): ICD-10-CM

## 2021-07-10 DIAGNOSIS — R55 VASOVAGAL SYNCOPE: ICD-10-CM

## 2021-07-10 DIAGNOSIS — R56.9 CONVULSIONS, UNSPECIFIED CONVULSION TYPE (HCC): ICD-10-CM

## 2021-07-10 DIAGNOSIS — I65.23 ASYMPTOMATIC CAROTID ARTERY STENOSIS, BILATERAL: ICD-10-CM

## 2021-07-10 DIAGNOSIS — I63.312 THROMBOTIC STROKE INVOLVING LEFT MIDDLE CEREBRAL ARTERY (HCC): ICD-10-CM

## 2021-07-10 DIAGNOSIS — G30.1 DEMENTIA OF THE ALZHEIMER'S TYPE WITH LATE ONSET WITHOUT BEHAVIORAL DISTURBANCE (HCC): ICD-10-CM

## 2021-07-10 DIAGNOSIS — F02.80 DEMENTIA OF THE ALZHEIMER'S TYPE WITH LATE ONSET WITHOUT BEHAVIORAL DISTURBANCE (HCC): ICD-10-CM

## 2021-07-10 DIAGNOSIS — E53.8 B12 DEFICIENCY: ICD-10-CM

## 2021-07-11 RX ORDER — CARBIDOPA AND LEVODOPA 25; 100 MG/1; MG/1
2 TABLET ORAL 3 TIMES DAILY
Qty: 540 TABLET | Refills: 2 | Status: SHIPPED | OUTPATIENT
Start: 2021-07-11 | End: 2022-10-18

## 2021-08-24 ENCOUNTER — TELEPHONE (OUTPATIENT)
Dept: INTERNAL MEDICINE CLINIC | Age: 86
End: 2021-08-24

## 2021-08-24 NOTE — TELEPHONE ENCOUNTER
Linn at [address] called requesting a home health referral for Mr Tello for skilled nursing. She stated he has had frequent falls, incontinent of bladder/bowels and she doesn't think he is getting proper care. She stated she has a  trying to get resources as well for Mr. Tello.

## 2021-08-31 RX ORDER — AMLODIPINE BESYLATE 5 MG/1
TABLET ORAL
Qty: 90 TABLET | Refills: 2 | Status: SHIPPED | OUTPATIENT
Start: 2021-08-31 | End: 2022-06-20

## 2021-10-06 RX ORDER — FUROSEMIDE 20 MG/1
TABLET ORAL
Qty: 90 TABLET | Refills: 1 | Status: SHIPPED | OUTPATIENT
Start: 2021-10-06 | End: 2022-04-04

## 2021-10-06 RX ORDER — PREDNISONE 10 MG/1
TABLET ORAL
Qty: 165 TABLET | Refills: 0 | Status: SHIPPED | OUTPATIENT
Start: 2021-10-06 | End: 2021-11-03

## 2021-11-03 RX ORDER — PREDNISONE 10 MG/1
TABLET ORAL
Qty: 165 TABLET | Refills: 0 | Status: SHIPPED | OUTPATIENT
Start: 2021-11-03 | End: 2022-01-12 | Stop reason: ALTCHOICE

## 2021-11-15 RX ORDER — MIRABEGRON 25 MG/1
TABLET, FILM COATED, EXTENDED RELEASE ORAL
Qty: 90 TABLET | Refills: 3 | Status: SHIPPED | OUTPATIENT
Start: 2021-11-15

## 2021-12-31 DIAGNOSIS — R55 VASOVAGAL SYNCOPE: ICD-10-CM

## 2021-12-31 DIAGNOSIS — R41.3 MEMORY LOSS: ICD-10-CM

## 2021-12-31 DIAGNOSIS — R56.9 CONVULSIONS, UNSPECIFIED CONVULSION TYPE (HCC): ICD-10-CM

## 2021-12-31 DIAGNOSIS — F02.80 DEMENTIA OF THE ALZHEIMER'S TYPE WITH LATE ONSET WITHOUT BEHAVIORAL DISTURBANCE (HCC): ICD-10-CM

## 2021-12-31 DIAGNOSIS — I65.23 ASYMPTOMATIC CAROTID ARTERY STENOSIS, BILATERAL: ICD-10-CM

## 2021-12-31 DIAGNOSIS — I65.23 STENOSIS OF BOTH CAROTID ARTERIES WITHOUT CEREBRAL INFARCTION: ICD-10-CM

## 2021-12-31 DIAGNOSIS — Z86.73 HISTORY OF CVA (CEREBROVASCULAR ACCIDENT): ICD-10-CM

## 2021-12-31 DIAGNOSIS — G20 PARKINSON DISEASE (HCC): ICD-10-CM

## 2021-12-31 DIAGNOSIS — G40.209 COMPLEX PARTIAL SEIZURE EVOLVING TO GENERALIZED SEIZURE (HCC): ICD-10-CM

## 2021-12-31 DIAGNOSIS — R27.0 ATAXIA: ICD-10-CM

## 2021-12-31 DIAGNOSIS — I69.30 LATE EFFECT OF STROKE: ICD-10-CM

## 2021-12-31 DIAGNOSIS — I63.312 THROMBOTIC STROKE INVOLVING LEFT MIDDLE CEREBRAL ARTERY (HCC): ICD-10-CM

## 2021-12-31 DIAGNOSIS — G30.1 DEMENTIA OF THE ALZHEIMER'S TYPE WITH LATE ONSET WITHOUT BEHAVIORAL DISTURBANCE (HCC): ICD-10-CM

## 2021-12-31 DIAGNOSIS — E53.8 B12 DEFICIENCY: ICD-10-CM

## 2021-12-31 RX ORDER — LEVETIRACETAM 500 MG/1
TABLET ORAL
Qty: 270 TABLET | Refills: 5 | Status: SHIPPED | OUTPATIENT
Start: 2021-12-31

## 2022-01-12 ENCOUNTER — OFFICE VISIT (OUTPATIENT)
Dept: INTERNAL MEDICINE CLINIC | Age: 87
End: 2022-01-12
Payer: MEDICARE

## 2022-01-12 VITALS
TEMPERATURE: 98.2 F | SYSTOLIC BLOOD PRESSURE: 124 MMHG | HEART RATE: 70 BPM | WEIGHT: 123 LBS | RESPIRATION RATE: 16 BRPM | DIASTOLIC BLOOD PRESSURE: 66 MMHG | HEIGHT: 69 IN | BODY MASS INDEX: 18.22 KG/M2

## 2022-01-12 DIAGNOSIS — G30.1 DEMENTIA OF THE ALZHEIMER'S TYPE WITH LATE ONSET WITHOUT BEHAVIORAL DISTURBANCE (HCC): ICD-10-CM

## 2022-01-12 DIAGNOSIS — Z79.899 ON STATIN THERAPY: ICD-10-CM

## 2022-01-12 DIAGNOSIS — G40.909 SEIZURE DISORDER (HCC): ICD-10-CM

## 2022-01-12 DIAGNOSIS — E72.11 HYPERHOMOCYSTEINEMIA (HCC): ICD-10-CM

## 2022-01-12 DIAGNOSIS — M35.3 PMR (POLYMYALGIA RHEUMATICA) (HCC): ICD-10-CM

## 2022-01-12 DIAGNOSIS — E78.00 ELEVATED CHOLESTEROL: ICD-10-CM

## 2022-01-12 DIAGNOSIS — G20 PARKINSON DISEASE (HCC): ICD-10-CM

## 2022-01-12 DIAGNOSIS — C61 PROSTATE CANCER (HCC): ICD-10-CM

## 2022-01-12 DIAGNOSIS — F02.80 DEMENTIA OF THE ALZHEIMER'S TYPE WITH LATE ONSET WITHOUT BEHAVIORAL DISTURBANCE (HCC): ICD-10-CM

## 2022-01-12 DIAGNOSIS — I67.9 CEREBROVASCULAR DISEASE: Primary | ICD-10-CM

## 2022-01-12 DIAGNOSIS — D51.0 PERNICIOUS ANEMIA: ICD-10-CM

## 2022-01-12 LAB
ALBUMIN SERPL-MCNC: 3.5 G/DL (ref 3.5–5)
ALBUMIN/GLOB SERPL: 1 {RATIO} (ref 1.1–2.2)
ALP SERPL-CCNC: 94 U/L (ref 45–117)
ALT SERPL-CCNC: 36 U/L (ref 12–78)
ANION GAP SERPL CALC-SCNC: 4 MMOL/L (ref 5–15)
AST SERPL-CCNC: 39 U/L (ref 15–37)
BILIRUB SERPL-MCNC: 0.3 MG/DL (ref 0.2–1)
BUN SERPL-MCNC: 26 MG/DL (ref 6–20)
BUN/CREAT SERPL: 20 (ref 12–20)
CALCIUM SERPL-MCNC: 9.7 MG/DL (ref 8.5–10.1)
CHLORIDE SERPL-SCNC: 110 MMOL/L (ref 97–108)
CHOLEST SERPL-MCNC: 152 MG/DL
CK SERPL-CCNC: 46 U/L (ref 39–308)
CO2 SERPL-SCNC: 29 MMOL/L (ref 21–32)
CREAT SERPL-MCNC: 1.32 MG/DL (ref 0.7–1.3)
GLOBULIN SER CALC-MCNC: 3.5 G/DL (ref 2–4)
GLUCOSE SERPL-MCNC: 108 MG/DL (ref 65–100)
HDLC SERPL-MCNC: 71 MG/DL
HDLC SERPL: 2.1 {RATIO} (ref 0–5)
LDLC SERPL CALC-MCNC: 71.4 MG/DL (ref 0–100)
POTASSIUM SERPL-SCNC: 5 MMOL/L (ref 3.5–5.1)
PROT SERPL-MCNC: 7 G/DL (ref 6.4–8.2)
SODIUM SERPL-SCNC: 143 MMOL/L (ref 136–145)
TRIGL SERPL-MCNC: 48 MG/DL (ref ?–150)
VLDLC SERPL CALC-MCNC: 9.6 MG/DL

## 2022-01-12 PROCEDURE — G8536 NO DOC ELDER MAL SCRN: HCPCS | Performed by: INTERNAL MEDICINE

## 2022-01-12 PROCEDURE — 99214 OFFICE O/P EST MOD 30 MIN: CPT | Performed by: INTERNAL MEDICINE

## 2022-01-12 PROCEDURE — G8419 CALC BMI OUT NRM PARAM NOF/U: HCPCS | Performed by: INTERNAL MEDICINE

## 2022-01-12 PROCEDURE — G8427 DOCREV CUR MEDS BY ELIG CLIN: HCPCS | Performed by: INTERNAL MEDICINE

## 2022-01-12 PROCEDURE — G8510 SCR DEP NEG, NO PLAN REQD: HCPCS | Performed by: INTERNAL MEDICINE

## 2022-01-12 PROCEDURE — 1101F PT FALLS ASSESS-DOCD LE1/YR: CPT | Performed by: INTERNAL MEDICINE

## 2022-01-12 NOTE — PROGRESS NOTES
Xu Becerra presents today at the clinic for    Chief Complaint   Patient presents with    Hypertension     follow up    Parkinsons Disease     follow up    Seizure     follow up    Dementia     follow up    Cholesterol Problem     follow up        Wt Readings from Last 3 Encounters:   01/12/22 123 lb (55.8 kg)   06/22/21 144 lb (65.3 kg)   01/12/21 144 lb 6.4 oz (65.5 kg)     Temp Readings from Last 3 Encounters:   01/12/22 98.2 °F (36.8 °C) (Oral)   06/22/21 99.3 °F (37.4 °C) (Oral)   05/13/21 98.2 °F (36.8 °C) (Temporal)     BP Readings from Last 3 Encounters:   01/12/22 124/66   06/22/21 (!) 147/57   05/13/21 124/60     Pulse Readings from Last 3 Encounters:   01/12/22 70   06/22/21 68   05/13/21 82       Health Maintenance Due   Topic    DTaP/Tdap/Td series (1 - Tdap)    Shingrix Vaccine Age 50> (1 of 2)    Flu Vaccine (1)         Learning Assessment:  :     Learning Assessment 3/22/2018 9/6/2017 4/19/2016   PRIMARY LEARNER Patient Patient Patient   HIGHEST LEVEL OF EDUCATION - PRIMARY LEARNER  - DID NOT GRADUATE HIGH SCHOOL -   BARRIERS PRIMARY LEARNER - NONE -   CO-LEARNER CAREGIVER - No -   PRIMARY LANGUAGE ENGLISH ENGLISH ENGLISH   LEARNER PREFERENCE PRIMARY DEMONSTRATION READING DEMONSTRATION   ANSWERED BY patient patient patient   RELATIONSHIP SELF SELF SELF       Depression Screening:  :     3 most recent PHQ Screens 1/12/2022   PHQ Not Done -   Little interest or pleasure in doing things Not at all   Feeling down, depressed, irritable, or hopeless Not at all   Total Score PHQ 2 0       Fall Risk Assessment:  :     Fall Risk Assessment, last 12 mths 1/12/2022   Able to walk? Yes   Fall in past 12 months? 1   Do you feel unsteady? 0   Are you worried about falling 0   Is the gait abnormal? -   Number of falls in past 12 months 1   Fall with injury? 0       Abuse Screening:  :     Abuse Screening Questionnaire 3/25/2019   Do you ever feel afraid of your partner?  N   Are you in a relationship with someone who physically or mentally threatens you? N   Is it safe for you to go home? Y       Coordination of Care Questionnaire:  :     1. Have you been to the ER, urgent care clinic since your last visit? Hospitalized since your last visit? no    2. Have you seen or consulted any other health care providers outside of the [address] [place] [address] since your last visit? Include any pap smears or colon screening.  no

## 2022-01-12 NOTE — PROGRESS NOTES
Aki Herrera is a 80 y.o. male and presents with Hypertension (follow up), Parkinsons Disease (follow up), Seizure (follow up), Dementia (follow up), and Cholesterol Problem (follow up)  . Subjective:  Mr. Praveen Marie presents today for follow-up of several problems including hypertension, Parkinson's disease, history of seizures, dementia, and hyperlipidemia. He is not ambulatory and is in a transport chair accompanied by his daughter. He is able to give limited history and his voice is difficult to understand but his daughter can help interpret for him. She notes that he does not take the medications as prescribed. He does not appear to forget the medications because he will selectively take out 1 or 2 of the medications and take them only. His appetite seems to be better and he is supplementing this with Ensure. His daughter notes that he does cough frequently after eating. He has not had any history of aspiration previously. He had some dental work done and his daughter notes that he has not smoked since that time. Past Medical History:   Diagnosis Date    Arthritis of ankle 8/16/2017    [facility])     Prostate.  Cellulitis 8/16/2017    Cerebrovascular disease 8/16/2017    Cervical spinal stenosis 5/13/2021    Detrusor dysfunction 8/16/2017    Edema 8/16/2017    Elevated cholesterol 8/16/2017    Fatigue 8/16/2017    Gastrointestinal disorder     ulcer    Hypertension     On statin therapy 8/16/2017    Parkinsons disease ([address] 8/16/2017    Pernicious anemia 8/16/2017    Prostate cancer ([facility] 8/16/2017    Raynaud disease 8/16/2017    Seizure disorder ([facility] 8/16/2017    Tobacco abuse 8/16/2017    Weight loss 8/16/2017     Past Surgical History:   Procedure Laterality Date    HX PROSTATECTOMY      [place] ABDOMEN SURGERY PROC UNLISTED      Ulcer.       Allergies   Allergen Reactions    Pcn [Penicillins] Swelling     Current Outpatient Medications   Medication Sig Dispense Refill    levETIRAcetam (KEPPRA) 500 mg tablet TAKE 1 TABLET BY MOUTH EVERY MORNING AND 2 AT BEDTIME 270 Tablet 5    Myrbetriq 25 mg ER tablet TAKE 1 TABLET BY MOUTH EVERY DAY 90 Tablet 3    furosemide (LASIX) 20 mg tablet TAKE 1 TABLET BY MOUTH EVERY DAY 90 Tablet 1    amLODIPine (NORVASC) 5 mg tablet TAKE 1 TABLET BY MOUTH EVERY DAY 90 Tablet 2    carbidopa-levodopa (SINEMET)  mg per tablet TAKE 2 TABS BY MOUTH THREE (3) TIMES DAILY. 540 Tablet 2    zonisamide (ZONEGRAN) 100 mg capsule TAKE 1 CAPSULE BY MOUTH EVERY DAY 90 Capsule 3    rasagiline (AZILECT) 1 mg tablet TAKE 1 TABLET BY MOUTH EVERY DAY 90 Tab 5    tamsulosin (FLOMAX) 0.4 mg capsule TAKE 1 CAPSULE BY MOUTH EVERY DAY 90 Cap 3    pravastatin (PRAVACHOL) 40 mg tablet TAKE 1 TABLET BY MOUTH EVERY DAY 90 Tab 3    aspirin-dipyridamole (AGGRENOX)  mg per SR capsule TAKE 1 CAPSULE BY MOUTH TWICE A  Cap 4    multivit with minerals/lutein (MULTIVITAMIN 50 PLUS PO) Take 1 Tab by mouth daily. Social History     Socioeconomic History    Marital status: SINGLE   Tobacco Use    Smoking status: Former Smoker     Packs/day: 0.50     Quit date: 4/15/2021     Years since quittin.7    Smokeless tobacco: Never Used   Vaping Use    Vaping Use: Never used   Substance and Sexual Activity    Alcohol use: No    Drug use: No    Sexual activity: Not Currently     Family History   Problem Relation Age of Onset    Dementia Mother     Stroke Father     Heart Disease Sister     Hypertension Sister     Hypertension Brother     OSTEOARTHRITIS Brother     Heart Disease Brother     COPD Brother     Hypertension Child        Review of Systems  Constitutional:  negative for fevers, chills, anorexia and weight loss  Eyes:    negative for visual disturbance and irritation  ENT:    negative for tinnitus,sore throat,nasal congestion,ear pains. hoarseness  Respiratory:     negative for hemoptysis, dyspnea,wheezing  CV:    negative for chest pain, palpitations, lower extremity edema  GI:    negative for nausea, vomiting, diarrhea, abdominal pain,melena  Endo:               negative for polyuria,polydipsia,polyphagia,heat intolerance  Genitourinary : negative for frequency, dysuria and hematuria  Integumentary: negative for rash and pruritus  Hematologic:   negative for easy bruising and gum/nose bleeding  Musculoskel:  negative for myalgias, arthralgias, back pain, muscle weakness, joint pain  Neurological:   negative for headaches, dizziness, vertigo, memory problems and gait   Behavl/Psych:  negative for feelings of anxiety, depression, mood changes  ROS otherwise negative      Objective:  Visit Vitals  /66 (BP 1 Location: Left upper arm, BP Patient Position: Sitting, BP Cuff Size: Adult)   Pulse 70   Temp 98.2 °F (36.8 °C) (Oral)   Resp 16   Ht 5' 9\" (1.753 m)   Wt 123 lb (55.8 kg)   BMI 18.16 kg/m²     Physical Exam:   General appearance - alert, frail and elderly appearing and in no distress  Mental status - alert, oriented to person  EYE-YARY, EOMI, fundi normal, corneas normal, no foreign bodies  ENT-ENT exam normal, no neck nodes or sinus tenderness  Nose - normal and patent, no erythema, discharge or polyps  Mouth - mucous membranes moist, pharynx normal without lesions  Neck - supple, no significant adenopathy   Chest - clear to auscultation, no wheezes, rales or rhonchi, symmetric air entry   Heart - normal rate, regular rhythm, normal S1, S2, no murmurs, rubs, clicks or gallops   Abdomen - soft, nontender, nondistended, no masses or organomegaly  Lymph- no adenopathy palpable  Ext-peripheral pulses normal, no pedal edema, no clubbing or cyanosis  Skin-Warm and dry. no hyperpigmentation, vitiligo, or suspicious lesions  Neuro flat facies, no tremor noted, some rigidity or cogwheeling noted, unable to stand without assistance      Assessment/Plan:  Diagnoses and all orders for this visit:    1. Cerebrovascular disease    2. Parkinson disease ([facility]    3. PMR (polymyalgia rheumatica) ([facility])    4. 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 Seizure disorder ([facility]    6. Hyperhomocysteinemia ([facility]    7. Prostate cancer ([facility]    8. Pernicious anemia    9. On statin therapy  -     CK; Future  -     LIPID PANEL; Future  -     METABOLIC PANEL, COMPREHENSIVE; Future    10. Elevated cholesterol  -     CK; Future  -     LIPID PANEL; Future  -     METABOLIC PANEL, COMPREHENSIVE; Future          ICD-10-CM ICD-9-CM    1. Cerebrovascular disease  I67.9 437.9    2. Parkinson disease ([facility]  G20 332.0    3. PMR (polymyalgia rheumatica) ([facility])  M35.3 725    4. 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 Seizure disorder ([facility]  G40.909 345.90    6. Hyperhomocysteinemia ([facility])  E72.11 270.4    7. Prostate cancer ([facility]  C61 185    8. Pernicious anemia  D51.0 281.0    9. On statin therapy  Z79.899 V58.69 CK      LIPID PANEL      METABOLIC PANEL, COMPREHENSIVE      CK      LIPID PANEL      METABOLIC PANEL, COMPREHENSIVE   10. Elevated cholesterol  E78.00 272.0 CK      LIPID PANEL      METABOLIC PANEL, COMPREHENSIVE      CK      LIPID PANEL      METABOLIC PANEL, COMPREHENSIVE     Plan:    The patient is doing relatively well considering his advanced age and multiple medical problems. He does appear to have some risk for aspiration and we have discussed using a thickener for liquids and to try to cut up his food or only eat foods that are soft to help minimize his risk. We can pursue a formal speech therapy consultation if symptoms progress. I have encouraged him to take his medications as prescribed. Follow-up and Dispositions    · Return in about 6 months (around 7/12/2022) for [address] [place] [address]. I have reviewed with the patient details of the assessment and plan and all questions were answered. Relevent patient education was performed. Verbal and/or written instructions (see AVS) provided. The most recent lab findings were reviewed with the patient. Plan was discussed with patient who verbally expressed understanding. An After Visit Summary was printed and given to the patient.     Regino Gonzalez MD

## 2022-03-18 PROBLEM — I10 ESSENTIAL HYPERTENSION: Status: ACTIVE | Noted: 2017-09-06

## 2022-03-18 PROBLEM — R63.4 WEIGHT LOSS: Status: ACTIVE | Noted: 2017-08-16

## 2022-03-18 PROBLEM — E78.00 ELEVATED CHOLESTEROL: Status: ACTIVE | Noted: 2017-08-16

## 2022-03-19 PROBLEM — M19.079 ARTHRITIS OF ANKLE: Status: ACTIVE | Noted: 2017-08-16

## 2022-03-19 PROBLEM — C61 PROSTATE CANCER (HCC): Status: ACTIVE | Noted: 2017-08-16

## 2022-03-19 PROBLEM — N31.8 DETRUSOR DYSFUNCTION: Status: ACTIVE | Noted: 2017-08-16

## 2022-03-19 PROBLEM — Z72.0 TOBACCO ABUSE: Status: ACTIVE | Noted: 2017-08-16

## 2022-03-19 PROBLEM — G40.909 SEIZURE DISORDER (HCC): Status: ACTIVE | Noted: 2017-08-16

## 2022-03-19 PROBLEM — R53.83 FATIGUE: Status: ACTIVE | Noted: 2017-08-16

## 2022-03-19 PROBLEM — R60.9 EDEMA: Status: ACTIVE | Noted: 2017-08-16

## 2022-03-19 PROBLEM — G40.209 COMPLEX PARTIAL SEIZURE EVOLVING TO GENERALIZED SEIZURE (HCC): Status: ACTIVE | Noted: 2018-03-22

## 2022-03-19 PROBLEM — I67.9 CEREBROVASCULAR DISEASE: Status: ACTIVE | Noted: 2017-08-16

## 2022-03-19 PROBLEM — I63.312 THROMBOTIC STROKE INVOLVING LEFT MIDDLE CEREBRAL ARTERY (HCC): Status: ACTIVE | Noted: 2018-03-22

## 2022-03-19 PROBLEM — G20.A1 PARKINSONS DISEASE: Status: ACTIVE | Noted: 2017-08-16

## 2022-03-19 PROBLEM — G20 PARKINSONS DISEASE (HCC): Status: ACTIVE | Noted: 2017-08-16

## 2022-03-19 PROBLEM — M48.02 CERVICAL SPINAL STENOSIS: Status: ACTIVE | Noted: 2021-05-13

## 2022-03-19 PROBLEM — D51.0 PERNICIOUS ANEMIA: Status: ACTIVE | Noted: 2017-08-16

## 2022-03-19 PROBLEM — L03.90 CELLULITIS: Status: ACTIVE | Noted: 2017-08-16

## 2022-03-20 DIAGNOSIS — G20 PARKINSON DISEASE (HCC): ICD-10-CM

## 2022-03-20 DIAGNOSIS — Z86.73 HISTORY OF CVA (CEREBROVASCULAR ACCIDENT): ICD-10-CM

## 2022-03-20 DIAGNOSIS — I65.23 ASYMPTOMATIC CAROTID ARTERY STENOSIS, BILATERAL: ICD-10-CM

## 2022-03-20 DIAGNOSIS — R41.3 MEMORY LOSS: ICD-10-CM

## 2022-03-20 DIAGNOSIS — R55 VASOVAGAL SYNCOPE: ICD-10-CM

## 2022-03-20 DIAGNOSIS — I69.30 LATE EFFECT OF STROKE: ICD-10-CM

## 2022-03-20 DIAGNOSIS — R56.9 CONVULSIONS, UNSPECIFIED CONVULSION TYPE (HCC): ICD-10-CM

## 2022-03-20 DIAGNOSIS — I65.23 STENOSIS OF BOTH CAROTID ARTERIES WITHOUT CEREBRAL INFARCTION: ICD-10-CM

## 2022-03-20 DIAGNOSIS — G30.1 DEMENTIA OF THE ALZHEIMER'S TYPE WITH LATE ONSET WITHOUT BEHAVIORAL DISTURBANCE (HCC): ICD-10-CM

## 2022-03-20 DIAGNOSIS — E53.8 B12 DEFICIENCY: ICD-10-CM

## 2022-03-20 DIAGNOSIS — F02.80 DEMENTIA OF THE ALZHEIMER'S TYPE WITH LATE ONSET WITHOUT BEHAVIORAL DISTURBANCE (HCC): ICD-10-CM

## 2022-03-20 DIAGNOSIS — R27.0 ATAXIA: ICD-10-CM

## 2022-03-20 DIAGNOSIS — G40.209 COMPLEX PARTIAL SEIZURE EVOLVING TO GENERALIZED SEIZURE (HCC): ICD-10-CM

## 2022-03-20 DIAGNOSIS — I63.312 THROMBOTIC STROKE INVOLVING LEFT MIDDLE CEREBRAL ARTERY (HCC): ICD-10-CM

## 2022-03-20 DIAGNOSIS — G20 PARKINSONS DISEASE (HCC): ICD-10-CM

## 2022-03-20 PROBLEM — Z79.899 ON STATIN THERAPY: Status: ACTIVE | Noted: 2017-08-16

## 2022-03-20 PROBLEM — I73.00 RAYNAUD DISEASE: Status: ACTIVE | Noted: 2017-08-16

## 2022-03-20 RX ORDER — ASPIRIN AND DIPYRIDAMOLE 25; 200 MG/1; MG/1
CAPSULE, EXTENDED RELEASE ORAL
Qty: 180 CAPSULE | Refills: 4 | Status: SHIPPED | OUTPATIENT
Start: 2022-03-20

## 2022-04-04 RX ORDER — FUROSEMIDE 20 MG/1
TABLET ORAL
Qty: 90 TABLET | Refills: 1 | Status: SHIPPED | OUTPATIENT
Start: 2022-04-04

## 2022-04-04 RX ORDER — TAMSULOSIN HYDROCHLORIDE 0.4 MG/1
CAPSULE ORAL
Qty: 90 CAPSULE | Refills: 3 | Status: SHIPPED | OUTPATIENT
Start: 2022-04-04

## 2022-04-04 RX ORDER — PRAVASTATIN SODIUM 40 MG/1
TABLET ORAL
Qty: 90 TABLET | Refills: 3 | Status: SHIPPED | OUTPATIENT
Start: 2022-04-04

## 2022-05-18 ENCOUNTER — TELEPHONE (OUTPATIENT)
Dept: INTERNAL MEDICINE CLINIC | Age: 87
End: 2022-05-18

## 2022-05-18 NOTE — TELEPHONE ENCOUNTER
Patients daughter Vikki Rodriguez calling stating she will need her FMLA papers filled out and returned today. Please advise.

## 2022-06-18 DIAGNOSIS — I63.312 THROMBOTIC STROKE INVOLVING LEFT MIDDLE CEREBRAL ARTERY (HCC): ICD-10-CM

## 2022-06-18 DIAGNOSIS — R56.9 CONVULSIONS, UNSPECIFIED CONVULSION TYPE (HCC): ICD-10-CM

## 2022-06-18 DIAGNOSIS — I65.23 ASYMPTOMATIC CAROTID ARTERY STENOSIS, BILATERAL: ICD-10-CM

## 2022-06-18 DIAGNOSIS — R55 VASOVAGAL SYNCOPE: ICD-10-CM

## 2022-06-18 DIAGNOSIS — R41.3 MEMORY LOSS: ICD-10-CM

## 2022-06-18 DIAGNOSIS — F02.80 DEMENTIA OF THE ALZHEIMER'S TYPE WITH LATE ONSET WITHOUT BEHAVIORAL DISTURBANCE (HCC): ICD-10-CM

## 2022-06-18 DIAGNOSIS — I65.23 STENOSIS OF BOTH CAROTID ARTERIES WITHOUT CEREBRAL INFARCTION: ICD-10-CM

## 2022-06-18 DIAGNOSIS — G30.1 DEMENTIA OF THE ALZHEIMER'S TYPE WITH LATE ONSET WITHOUT BEHAVIORAL DISTURBANCE (HCC): ICD-10-CM

## 2022-06-18 DIAGNOSIS — Z86.73 HISTORY OF CVA (CEREBROVASCULAR ACCIDENT): ICD-10-CM

## 2022-06-18 DIAGNOSIS — E53.8 B12 DEFICIENCY: ICD-10-CM

## 2022-06-18 DIAGNOSIS — R27.0 ATAXIA: ICD-10-CM

## 2022-06-18 DIAGNOSIS — I69.30 LATE EFFECT OF STROKE: ICD-10-CM

## 2022-06-18 DIAGNOSIS — G20 PARKINSON DISEASE (HCC): ICD-10-CM

## 2022-06-18 DIAGNOSIS — G40.209 COMPLEX PARTIAL SEIZURE EVOLVING TO GENERALIZED SEIZURE (HCC): ICD-10-CM

## 2022-06-18 RX ORDER — ZONISAMIDE 100 MG/1
CAPSULE ORAL
Qty: 90 CAPSULE | Refills: 3 | Status: SHIPPED | OUTPATIENT
Start: 2022-06-18

## 2022-06-19 DIAGNOSIS — I69.30 LATE EFFECT OF STROKE: ICD-10-CM

## 2022-06-19 DIAGNOSIS — G30.1 DEMENTIA OF THE ALZHEIMER'S TYPE WITH LATE ONSET WITHOUT BEHAVIORAL DISTURBANCE (HCC): ICD-10-CM

## 2022-06-19 DIAGNOSIS — R41.3 MEMORY LOSS: ICD-10-CM

## 2022-06-19 DIAGNOSIS — F02.80 DEMENTIA OF THE ALZHEIMER'S TYPE WITH LATE ONSET WITHOUT BEHAVIORAL DISTURBANCE (HCC): ICD-10-CM

## 2022-06-19 DIAGNOSIS — R27.0 ATAXIA: ICD-10-CM

## 2022-06-19 DIAGNOSIS — I63.312 THROMBOTIC STROKE INVOLVING LEFT MIDDLE CEREBRAL ARTERY (HCC): ICD-10-CM

## 2022-06-19 DIAGNOSIS — G40.209 COMPLEX PARTIAL SEIZURE EVOLVING TO GENERALIZED SEIZURE (HCC): ICD-10-CM

## 2022-06-19 DIAGNOSIS — Z86.73 HISTORY OF CVA (CEREBROVASCULAR ACCIDENT): ICD-10-CM

## 2022-06-19 DIAGNOSIS — I65.23 ASYMPTOMATIC CAROTID ARTERY STENOSIS, BILATERAL: ICD-10-CM

## 2022-06-19 DIAGNOSIS — E53.8 B12 DEFICIENCY: ICD-10-CM

## 2022-06-19 DIAGNOSIS — R55 VASOVAGAL SYNCOPE: ICD-10-CM

## 2022-06-19 DIAGNOSIS — G20 PARKINSON DISEASE (HCC): ICD-10-CM

## 2022-06-19 DIAGNOSIS — R56.9 CONVULSIONS, UNSPECIFIED CONVULSION TYPE (HCC): ICD-10-CM

## 2022-06-19 DIAGNOSIS — I65.23 STENOSIS OF BOTH CAROTID ARTERIES WITHOUT CEREBRAL INFARCTION: ICD-10-CM

## 2022-06-19 RX ORDER — RASAGILINE 1 MG/1
TABLET ORAL
Qty: 90 TABLET | Refills: 5 | Status: SHIPPED | OUTPATIENT
Start: 2022-06-19

## 2022-06-20 RX ORDER — AMLODIPINE BESYLATE 5 MG/1
TABLET ORAL
Qty: 90 TABLET | Refills: 2 | Status: SHIPPED | OUTPATIENT
Start: 2022-06-20

## 2022-07-14 ENCOUNTER — OFFICE VISIT (OUTPATIENT)
Dept: INTERNAL MEDICINE CLINIC | Age: 87
End: 2022-07-14
Payer: MEDICARE

## 2022-07-14 VITALS
RESPIRATION RATE: 16 BRPM | SYSTOLIC BLOOD PRESSURE: 132 MMHG | DIASTOLIC BLOOD PRESSURE: 70 MMHG | BODY MASS INDEX: 18.22 KG/M2 | OXYGEN SATURATION: 95 % | HEART RATE: 78 BPM | HEIGHT: 69 IN | TEMPERATURE: 98.3 F | WEIGHT: 123 LBS

## 2022-07-14 DIAGNOSIS — G40.909 SEIZURE DISORDER (HCC): ICD-10-CM

## 2022-07-14 DIAGNOSIS — I67.9 CEREBROVASCULAR DISEASE: ICD-10-CM

## 2022-07-14 DIAGNOSIS — I65.23 STENOSIS OF BOTH CAROTID ARTERIES WITHOUT CEREBRAL INFARCTION: ICD-10-CM

## 2022-07-14 DIAGNOSIS — Z71.89 ADVANCED DIRECTIVES, COUNSELING/DISCUSSION: ICD-10-CM

## 2022-07-14 DIAGNOSIS — Z00.00 MEDICARE ANNUAL WELLNESS VISIT, SUBSEQUENT: Primary | ICD-10-CM

## 2022-07-14 DIAGNOSIS — Z13.31 SCREENING FOR DEPRESSION: ICD-10-CM

## 2022-07-14 DIAGNOSIS — Z79.899 ON STATIN THERAPY: ICD-10-CM

## 2022-07-14 DIAGNOSIS — E72.11 HYPERHOMOCYSTEINEMIA (HCC): ICD-10-CM

## 2022-07-14 DIAGNOSIS — E53.8 B12 DEFICIENCY: ICD-10-CM

## 2022-07-14 DIAGNOSIS — Z13.1 SCREENING FOR DIABETES MELLITUS: ICD-10-CM

## 2022-07-14 DIAGNOSIS — C61 PROSTATE CANCER (HCC): ICD-10-CM

## 2022-07-14 DIAGNOSIS — G30.1 DEMENTIA OF THE ALZHEIMER'S TYPE WITH LATE ONSET WITHOUT BEHAVIORAL DISTURBANCE (HCC): ICD-10-CM

## 2022-07-14 DIAGNOSIS — I10 ESSENTIAL HYPERTENSION: Chronic | ICD-10-CM

## 2022-07-14 DIAGNOSIS — M48.02 CERVICAL SPINAL STENOSIS: ICD-10-CM

## 2022-07-14 DIAGNOSIS — E78.00 ELEVATED CHOLESTEROL: ICD-10-CM

## 2022-07-14 DIAGNOSIS — N31.8 DETRUSOR DYSFUNCTION: ICD-10-CM

## 2022-07-14 DIAGNOSIS — Z13.6 SCREENING FOR ISCHEMIC HEART DISEASE: ICD-10-CM

## 2022-07-14 DIAGNOSIS — F02.80 DEMENTIA OF THE ALZHEIMER'S TYPE WITH LATE ONSET WITHOUT BEHAVIORAL DISTURBANCE (HCC): ICD-10-CM

## 2022-07-14 DIAGNOSIS — G20 PARKINSON DISEASE (HCC): ICD-10-CM

## 2022-07-14 PROBLEM — N18.30 CHRONIC RENAL DISEASE, STAGE III (HCC): Status: ACTIVE | Noted: 2022-07-14

## 2022-07-14 LAB
ALBUMIN SERPL-MCNC: 3.5 G/DL (ref 3.5–5)
ALBUMIN/GLOB SERPL: 1 {RATIO} (ref 1.1–2.2)
ALP SERPL-CCNC: 88 U/L (ref 45–117)
ALT SERPL-CCNC: 48 U/L (ref 12–78)
ANION GAP SERPL CALC-SCNC: 2 MMOL/L (ref 5–15)
AST SERPL-CCNC: 34 U/L (ref 15–37)
BILIRUB SERPL-MCNC: 0.3 MG/DL (ref 0.2–1)
BUN SERPL-MCNC: 34 MG/DL (ref 6–20)
BUN/CREAT SERPL: 23 (ref 12–20)
CALCIUM SERPL-MCNC: 9.3 MG/DL (ref 8.5–10.1)
CHLORIDE SERPL-SCNC: 114 MMOL/L (ref 97–108)
CHOLEST SERPL-MCNC: 149 MG/DL
CK SERPL-CCNC: 72 U/L (ref 39–308)
CO2 SERPL-SCNC: 28 MMOL/L (ref 21–32)
CREAT SERPL-MCNC: 1.46 MG/DL (ref 0.7–1.3)
GLOBULIN SER CALC-MCNC: 3.5 G/DL (ref 2–4)
GLUCOSE SERPL-MCNC: 100 MG/DL (ref 65–100)
HDLC SERPL-MCNC: 83 MG/DL
HDLC SERPL: 1.8 {RATIO} (ref 0–5)
LDLC SERPL CALC-MCNC: 55.2 MG/DL (ref 0–100)
POTASSIUM SERPL-SCNC: 5 MMOL/L (ref 3.5–5.1)
PROT SERPL-MCNC: 7 G/DL (ref 6.4–8.2)
PSA SERPL-MCNC: 0 NG/ML (ref 0.01–4)
SODIUM SERPL-SCNC: 144 MMOL/L (ref 136–145)
TRIGL SERPL-MCNC: 54 MG/DL (ref ?–150)
VIT B12 SERPL-MCNC: 940 PG/ML (ref 193–986)
VLDLC SERPL CALC-MCNC: 10.8 MG/DL

## 2022-07-14 PROCEDURE — G8427 DOCREV CUR MEDS BY ELIG CLIN: HCPCS | Performed by: INTERNAL MEDICINE

## 2022-07-14 PROCEDURE — 99214 OFFICE O/P EST MOD 30 MIN: CPT | Performed by: INTERNAL MEDICINE

## 2022-07-14 PROCEDURE — G8536 NO DOC ELDER MAL SCRN: HCPCS | Performed by: INTERNAL MEDICINE

## 2022-07-14 PROCEDURE — G8419 CALC BMI OUT NRM PARAM NOF/U: HCPCS | Performed by: INTERNAL MEDICINE

## 2022-07-14 PROCEDURE — 1101F PT FALLS ASSESS-DOCD LE1/YR: CPT | Performed by: INTERNAL MEDICINE

## 2022-07-14 PROCEDURE — 1123F ACP DISCUSS/DSCN MKR DOCD: CPT | Performed by: INTERNAL MEDICINE

## 2022-07-14 PROCEDURE — G8432 DEP SCR NOT DOC, RNG: HCPCS | Performed by: INTERNAL MEDICINE

## 2022-07-14 PROCEDURE — G0439 PPPS, SUBSEQ VISIT: HCPCS | Performed by: INTERNAL MEDICINE

## 2022-07-14 NOTE — PROGRESS NOTES
This is the Subsequent Medicare Annual Wellness Exam, performed 12 months or more after the Initial AWV or the last Subsequent AWV    I have reviewed the patient's medical history in detail and updated the computerized patient record. Assessment/Plan   Education and counseling provided:  Are appropriate based on today's review and evaluation    1. Medicare annual wellness visit, subsequent  2. Cerebrovascular disease  3. Essential hypertension  -     LIPID PANEL; Future  -     METABOLIC PANEL, COMPREHENSIVE; Future  4. Stenosis of both carotid arteries without cerebral infarction  5. 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 [facility] disease ([facility]  7. Seizure disorder (HCC)  -     LEVETIRACETAM (KEPPRA); Future  8. Prostate cancer ([facility]  -     PSA, DIAGNOSTIC (PROSTATE SPECIFIC AG); Future  9. Detrusor dysfunction  10. Cervical spinal stenosis  11. On statin therapy  -     CK; Future  -     LIPID PANEL; Future  -     METABOLIC PANEL, COMPREHENSIVE; Future  12. B12 deficiency  -     VITAMIN B12; Future  13. Hyperhomocysteinemia (HCC)  -     HOMOCYSTEINE, PLASMA; Future  14. Elevated cholesterol  -     CK; Future  -     LIPID PANEL; Future  15. Advanced directives, counseling/discussion  16. Screening for diabetes mellitus  17. Screening for ischemic heart disease  18. Screening for depression  -     DEPRESSION SCREEN ANNUAL       Depression Risk Factor Screening     3 most recent PHQ Screens 1/12/2022   PHQ Not Done -   Little interest or pleasure in doing things Not at all   Feeling down, depressed, irritable, or hopeless Not at all   Total Score PHQ 2 0       Alcohol & Drug Abuse Risk Screen    Do you average more than 1 drink per night or more than 7 drinks a week: No    In the past three months have you have had more than 4 drinks containing alcohol on one occasion: No          Functional Ability and Level of Safety    Hearing: Hearing is good. Activities of Daily Living: The home contains: Transport chair  Patient needs help with:  phone, transportation, shopping, preparing meals, laundry, housework, managing medications, managing money, eating, dressing, bathing, hygiene, bathroom needs and walking      Ambulation: with difficulty, uses a Transport chair     Fall Risk:  Fall Risk Assessment, last 12 mths 1/12/2022   Able to walk? Yes   Fall in past 12 months? 1   Do you feel unsteady? 0   Are you worried about falling 0   Is the gait abnormal? -   Number of falls in past 12 months 1   Fall with injury?  0      Abuse Screen:  Patient is not abused       Cognitive Screening    Has your family/caregiver stated any concerns about your memory: yes - Known history of Parkinson's related dementia and cerebrovascular disease     Cognitive Screening: Abnormal - Verbal Fluency Test    Health Maintenance Due     Health Maintenance Due   Topic Date Due    DTaP/Tdap/Td series (1 - Tdap) Never done    Shingrix Vaccine Age 49> (1 of 2) Never done       Patient Care Team   Patient Care Team:  Daren Busby MD as PCP - General (Internal Medicine Physician)  Daren Busby 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 behavioral disturbance ([facility] [address] G30.1, F02.80    Stenosis of both carotid arteries without cerebral infarction I65.23    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    Cellulitis L03.90    Cerebrovascular disease I67.9    Detrusor dysfunction N31.8    Edema R60.9    Elevated cholesterol E78.00    Fatigue R53.83    On statin therapy Z79.899    Parkinsons disease ([facility] [address] [facility] G20    Pernicious anemia D51.0    Prostate cancer ([facility] [address] [facility] C61    Raynaud disease I73.00    Seizure disorder ([facility] [address] G40.909    Tobacco abuse Z72.0    Weight loss R63.4    Essential hypertension I10    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  Cellulitis 8/16/2017    Cerebrovascular disease 8/16/2017    Cervical spinal stenosis 5/13/2021    Detrusor dysfunction 8/16/2017    Edema 8/16/2017    Elevated cholesterol 8/16/2017    Fatigue 8/16/2017    Gastrointestinal disorder     ulcer    Hypertension     On statin therapy 8/16/2017    Parkinsons disease ([facility] [address] 8/16/2017    Pernicious anemia 8/16/2017    Prostate cancer ([facility] [address] 8/16/2017    Raynaud disease 8/16/2017    Seizure disorder ([facility] [address] 8/16/2017    Tobacco abuse 8/16/2017    Weight loss 8/16/2017      Past Surgical History:   Procedure Laterality Date    HX PROSTATECTOMY      CA ABDOMEN SURGERY PROC UNLISTED      Ulcer.       Current Outpatient Medications   Medication Sig Dispense Refill    amLODIPine (NORVASC) 5 mg tablet TAKE 1 TABLET BY MOUTH EVERY DAY 90 Tablet 2    rasagiline (AZILECT) 1 mg tablet TAKE 1 TABLET BY MOUTH EVERY DAY 90 Tablet 5    zonisamide (ZONEGRAN) 100 mg capsule TAKE 1 CAPSULE BY MOUTH EVERY DAY 90 Capsule 3    pravastatin (PRAVACHOL) 40 mg tablet TAKE 1 TABLET BY MOUTH EVERY DAY 90 Tablet 3    tamsulosin (FLOMAX) 0.4 mg capsule TAKE 1 CAPSULE BY MOUTH EVERY DAY 90 Capsule 3    furosemide (LASIX) 20 mg tablet TAKE 1 TABLET BY MOUTH EVERY DAY 90 Tablet 1    aspirin-dipyridamole (AGGRENOX)  mg per SR capsule TAKE 1 CAPSULE BY MOUTH TWICE A  Capsule 4    levETIRAcetam (KEPPRA) 500 mg tablet TAKE 1 TABLET BY MOUTH EVERY MORNING AND 2 AT BEDTIME 270 Tablet 5    Myrbetriq 25 mg ER tablet TAKE 1 TABLET BY MOUTH EVERY DAY 90 Tablet 3    carbidopa-levodopa (SINEMET)  mg per tablet TAKE 2 TABS BY MOUTH THREE (3) TIMES DAILY. 540 Tablet 2    multivit with minerals/lutein (MULTIVITAMIN 50 PLUS PO) Take 1 Tab by mouth daily. Allergies   Allergen Reactions    Pcn [Penicillins] Swelling       Family History   Problem Relation Age of Onset    Dementia Mother     Stroke Father     Heart Disease Sister     Hypertension Sister     Hypertension Brother     OSTEOARTHRITIS Brother     Heart Disease Brother     COPD Brother     Hypertension Child      Social History     Tobacco Use    Smoking status: Former Smoker     Packs/day: 0.50     Quit date: 4/15/2021     Years since quittin.2    Smokeless tobacco: Never Used   Substance Use Topics    Alcohol use: No     Approximately 10 minutes of this encounter utilized for depression screening. Suyapa Pinto MD         Formerly [facility]. is a 80 y.o. male and presents with Follow Up Chronic Condition (6 mo fu) and Annual Wellness Visit  . Subjective:    Mr. Madelaine Nunez presents today accompanied by his daughter for follow-up Medicare annual wellness review as well as follow-up of multiple medical problems including Parkinson's disease, history of CVA, cervical spinal stenosis, hyperlipidemia, monitoring statin therapy, history of prostate cancer, urinary incontinence, hypertension. He has had some hallucinations and memory issues related to his history cerebrovascular disease stroke and Parkinson's. He has no shortness of breath, chest pain, palpitations, PND, orthopnea. His pedal edema appears to be controlled on his current dose of furosemide. Past Medical History:   Diagnosis Date    Arthritis of ankle 2017    Cancer [facility])     Prostate.     Cellulitis 2017    Cerebrovascular disease 2017    Cervical spinal stenosis 2021    Detrusor dysfunction 2017    Edema 2017    Elevated cholesterol 2017    Fatigue 2017    Gastrointestinal disorder     ulcer    Hypertension     On statin therapy 2017    Parkinsons disease ([facility] 2017    Pernicious anemia 2017    Prostate cancer ([facility] 2017    Raynaud disease 2017    Seizure disorder ([facility] 2017    Tobacco abuse 2017    Weight loss 2017     Past Surgical History:   Procedure Laterality Date    HX PROSTATECTOMY      [place] ABDOMEN SURGERY PROC UNLISTED      Ulcer. Allergies   Allergen Reactions    Pcn [Penicillins] Swelling     Current Outpatient Medications   Medication Sig Dispense Refill    amLODIPine (NORVASC) 5 mg tablet TAKE 1 TABLET BY MOUTH EVERY DAY 90 Tablet 2    rasagiline (AZILECT) 1 mg tablet TAKE 1 TABLET BY MOUTH EVERY DAY 90 Tablet 5    zonisamide (ZONEGRAN) 100 mg capsule TAKE 1 CAPSULE BY MOUTH EVERY DAY 90 Capsule 3    pravastatin (PRAVACHOL) 40 mg tablet TAKE 1 TABLET BY MOUTH EVERY DAY 90 Tablet 3    tamsulosin (FLOMAX) 0.4 mg capsule TAKE 1 CAPSULE BY MOUTH EVERY DAY 90 Capsule 3    furosemide (LASIX) 20 mg tablet TAKE 1 TABLET BY MOUTH EVERY DAY 90 Tablet 1    aspirin-dipyridamole (AGGRENOX)  mg per SR capsule TAKE 1 CAPSULE BY MOUTH TWICE A  Capsule 4    levETIRAcetam (KEPPRA) 500 mg tablet TAKE 1 TABLET BY MOUTH EVERY MORNING AND 2 AT BEDTIME 270 Tablet 5    Myrbetriq 25 mg ER tablet TAKE 1 TABLET BY MOUTH EVERY DAY 90 Tablet 3    carbidopa-levodopa (SINEMET)  mg per tablet TAKE 2 TABS BY MOUTH THREE (3) TIMES DAILY. 540 Tablet 2    multivit with minerals/lutein (MULTIVITAMIN 50 PLUS PO) Take 1 Tab by mouth daily.        Social History     Socioeconomic History    Marital status: SINGLE   Tobacco Use    Smoking status: Former Smoker     Packs/day: 0.50     Quit date: 4/15/2021     Years since quittin.2    Smokeless tobacco: Never Used   Vaping Use    Vaping Use: Never used   Substance and Sexual Activity    Alcohol use: No    Drug use: No    Sexual activity: Not Currently     Family History   Problem Relation Age of Onset    Dementia Mother     Stroke Father     Heart Disease Sister     Hypertension Sister     Hypertension Brother     OSTEOARTHRITIS Brother     Heart Disease Brother     COPD Brother     Hypertension Child        Review of Systems  Constitutional:  negative for fevers, chills, anorexia and weight loss  Eyes:    negative for visual disturbance and irritation  ENT:    negative for tinnitus,sore throat,nasal congestion,ear pains. hoarseness  Respiratory:     negative for cough, hemoptysis, dyspnea,wheezing  CV:    negative for chest pain, palpitations, lower extremity edema  GI:    negative for nausea, vomiting, diarrhea, abdominal pain,melena  Endo:               negative for polyuria,polydipsia,polyphagia,heat intolerance  Genitourinary : negative for frequency, dysuria and hematuria  Integumentary: negative for rash and pruritus  Hematologic:   negative for easy bruising and gum/nose bleeding  Musculoskel:  negative for myalgias, arthralgias, back pain, muscle weakness, joint pain  Neurological:   negative for headaches, dizziness, vertigo, memory problems and gait   Behavl/Psych:  negative for feelings of anxiety, depression, mood changes  ROS otherwise negative      Objective:  Visit Vitals  /70 (BP 1 Location: Left upper arm, BP Patient Position: Sitting, BP Cuff Size: Adult)   Pulse 78   Temp 98.3 °F (36.8 °C) (Oral)   Resp 16   Ht 5' 9\" (1.753 m)   Wt 123 lb (55.8 kg)   SpO2 95%   BMI 18.16 kg/m²     Physical Exam:   General appearance - alert, well appearing, and in no distress  Mental status - alert, oriented to person, place, and time  EYE-YARY, EOMI, fundi normal, corneas normal, no foreign bodies  ENT-ENT exam normal, no neck nodes or sinus tenderness  Nose - normal and patent, no erythema, discharge or polyps  Mouth - mucous membranes moist, pharynx normal without lesions  Neck - supple, no significant adenopathy   Chest - clear to auscultation, no wheezes, rales or rhonchi, symmetric air entry   Heart - normal rate, regular rhythm, normal S1, S2, no murmurs, rubs, clicks or gallops   Abdomen - soft, nontender, nondistended, no masses or organomegaly  Lymph- no adenopathy palpable  Ext-peripheral pulses normal, no pedal edema, no clubbing or cyanosis  Skin-Warm and dry. no hyperpigmentation, vitiligo, or suspicious lesions  Neuro -alert, oriented, normal speech, no focal findings or movement disorder noted      Assessment/Plan:  Diagnoses and all orders for this visit:    1. Medicare annual wellness visit, subsequent    2. Cerebrovascular disease    3. Essential hypertension  -     LIPID PANEL; Future  -     METABOLIC PANEL, COMPREHENSIVE; Future    4. Stenosis of both carotid arteries without cerebral infarction    5. 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 Parkinson disease ([facility] [address]    7. Seizure disorder (HCC)  -     LEVETIRACETAM (KEPPRA); Future    8. Prostate cancer ([facility] [address]  -     PSA, DIAGNOSTIC (PROSTATE SPECIFIC AG); Future    9. Detrusor dysfunction    10. Cervical spinal stenosis    11. On statin therapy  -     CK; Future  -     LIPID PANEL; Future  -     METABOLIC PANEL, COMPREHENSIVE; Future    12. B12 deficiency  -     VITAMIN B12; Future    13. Hyperhomocysteinemia (HCC)  -     HOMOCYSTEINE, PLASMA; Future    14. Elevated cholesterol  -     CK; Future  -     LIPID PANEL; Future    15. Advanced directives, counseling/discussion    16. Screening for diabetes mellitus    17. Screening for ischemic heart disease    18. Screening for depression  -     Carlos Caba          ICD-10-CM ICD-9-CM    1. Medicare annual wellness visit, subsequent  Z00.00 V70.0    2. Cerebrovascular disease  I67.9 437.9    3.  Essential hypertension  I10 401.9 LIPID PANEL      METABOLIC PANEL, COMPREHENSIVE   4. Stenosis of both carotid arteries without cerebral infarction  I65.23 433.10      433.30    5. 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 Parkinson disease ([facility]  G20 332.0    7. Seizure disorder ([facility]  G40.909 345.90 LEVETIRACETAM (KEPPRA)   8. Prostate cancer ([facility])  C61 185 PSA, DIAGNOSTIC (PROSTATE SPECIFIC AG)   9. Detrusor dysfunction  N31.8 596.59    10. Cervical spinal stenosis  M48.02 723.0    11. On statin therapy  Z79.899 V58.69 CK      LIPID PANEL      METABOLIC PANEL, COMPREHENSIVE   12. B12 deficiency  E53.8 266.2 VITAMIN B12   13. Hyperhomocysteinemia ([facility])  E72.11 270.4 HOMOCYSTEINE, PLASMA   14. Elevated cholesterol  E78.00 272.0 CK      LIPID PANEL   15. Advanced directives, counseling/discussion  Z71.89 V65.49    16. Screening for diabetes mellitus  Z13.1 V77.1    16. Screening for ischemic heart disease  Z13.6 V81.0    18. Screening for depression  Z13.31 V79.0 DEPRESSION SCREEN ANNUAL       Plan:    Continue current medical regimen as outlined above. Further recommendations based on labs as ordered. The patient may be experiencing some symptoms consistent with Raynaud's disease. If he has any pain or discomfort involving his fingers with ischemic changes his daughter will notify. He is on amlodipine which could be helpful for this and remains on Aggrenox. I have reviewed with the patient details of the assessment and plan and all questions were answered. Relevent patient education was performed. Verbal and/or written instructions (see AVS) provided. The most recent lab findings were reviewed with the patient. Plan was discussed with patient who verbally expressed understanding. An After Visit Summary was printed and given to the patient.     Jeaneth Diana MD

## 2022-07-14 NOTE — PATIENT INSTRUCTIONS
The best way to stay healthy is to live a healthy lifestyle. A healthy lifestyle includes regular exercise, eating a well-balanced diet, keeping a healthy weight and not smoking. Regular physical exams and screening tests are another important way to take care of yourself. Preventive exams provided by health care providers can find health problems early when treatment works best and can keep you from getting certain diseases or illnesses. Preventive services include exams, lab tests, screenings, shots, monitoring and information to help you take care of your own health. All people over 65 should have a pneumonia shot. Pneumonia shots are usually only needed once in a lifetime unless your doctor decides differently. In addition to your physical exam, some screening tests are recommended:    All people over 65 should have a yearly flu shot. People over 65 are at medium to high risk for Hepatitis B. Three shots are needed for complete protection. Bone mass measurement (dexa scan) is recommended every two years. Diabetes Mellitus screening is recommended every year. Glaucoma is an eye disease caused by high pressure in the eye. An eye exam is recommended every year. Cardiovascular screening tests that check your cholesterol and other blood fat (lipid) levels are recommended every five years. Colorectal Cancer screening tests help to find pre-cancerous polyps (growths in the colon) so they can be removed before they turn into cancer. Tests ordered for screening depend on your personal and family history risk factors. Prostate Cancer Screening (annually up to age 76)    Screening for breast cancer is recommended yearly with a Mammogram.    Screening for cervical and vaginal cancer is recommended with a pelvic and Pap test every two years.  However if you have had an abnormal pap in the past  three years or at high risk for cervical or vaginal cancer Medicare will cover a pap test and a pelvic exam every year. Here is a list of your current Health Maintenance items with a due date:  Health Maintenance Due   Topic Date Due    DTaP/Tdap/Td  (1 - Tdap) Never done    Shingles Vaccine (1 of 2) Never done    Annual Well Visit  06/23/2022       Medicare Wellness Visit, Male    The best way to live healthy is to have a lifestyle where you eat a well-balanced diet, exercise regularly, limit alcohol use, and quit all forms of tobacco/nicotine, if applicable. Regular preventive services are another way to keep healthy. Preventive services (vaccines, screening tests, monitoring & exams) can help personalize your care plan, which helps you manage your own care. Screening tests can find health problems at the earliest stages, when they are easiest to treat. [facility] follows the current, evidence-based guidelines published by the [facility] Carmine Anayeli ([facility]) when recommending preventive services for our patients. Because we follow these guidelines, sometimes recommendations change over time as research supports it. (For example, a prostate screening blood test is no longer routinely recommended for men with no symptoms). Of course, you and your doctor may decide to screen more often for some diseases, based on your risk and co-morbidities (chronic disease you are already diagnosed with). Preventive services for you include:  - Medicare offers their members a free annual wellness visit, which is time for you and your primary care provider to discuss and plan for your preventive service needs. Take advantage of this benefit every year!  -All adults over age 72 should receive the recommended pneumonia vaccines.  Current USPSTF guidelines recommend a series of two vaccines for the best pneumonia protection.   -All adults should have a flu vaccine yearly and tetanus vaccine every 10 years.  -All adults age 48 and older should receive the shingles vaccines (series of two vaccines). -All adults age 38-68 who are overweight should have a diabetes screening test once every three years.   -Other screening tests & preventive services for persons with diabetes include: an eye exam to screen for diabetic retinopathy, a kidney function test, a foot exam, and stricter control over your cholesterol.   -Cardiovascular screening for adults with routine risk involves an electrocardiogram (ECG) at intervals determined by the provider.   -Colorectal cancer screening should be done for adults age 54-65 with no increased risk factors for colorectal cancer. There are a number of acceptable methods of screening for this type of cancer. Each test has its own benefits and drawbacks. Discuss with your provider what is most appropriate for you during your annual wellness visit. The different tests include: colonoscopy (considered the best screening method), a fecal occult blood test, a fecal DNA test, and sigmoidoscopy.  -All adults born between [facility] should be screened once for Hepatitis C.  -An Abdominal Aortic Aneurysm (AAA) Screening is recommended for men age 73-68 who has ever smoked in their lifetime.      Here is a list of your current Health Maintenance items (your personalized list of preventive services) with a due date:  Health Maintenance Due   Topic Date Due    DTaP/Tdap/Td  (1 - Tdap) Never done    Shingles Vaccine (1 of 2) Never done

## 2022-07-14 NOTE — PROGRESS NOTES
Chief Complaint   Patient presents with    Follow Up Chronic Condition     6 mo fu    Annual Wellness Visit       Depression Risk Factor Screening:     3 most recent PHQ Screens 1/12/2022   PHQ Not Done -   Little interest or pleasure in doing things Not at all   Feeling down, depressed, irritable, or hopeless Not at all   Total Score PHQ 2 0       Functional Ability and Level of Safety:     Activities of Daily Living  ADL Assessment 3/25/2019   Feeding yourself No Help Needed   Getting from bed to chair No Help Needed   Getting dressed No Help Needed   Bathing or showering Help Needed   Walk across the room (includes cane/walker) No Help Needed   Using the telphone Help Needed   Taking your medications Help Needed   Preparing meals Help Needed   Managing money (expenses/bills) No Help Needed   Moderately strenuous housework (laundry) No Help Needed   Shopping for personal items (toiletries/medicines) Help Needed   Shopping for groceries Help Needed   Driving Help Needed   Climbing a flight of stairs Help Needed   Getting to places beyond walking distances Help Needed       Fall Risk  Fall Risk Assessment, last 12 mths 1/12/2022   Able to walk? Yes   Fall in past 12 months? 1   Do you feel unsteady? 0   Are you worried about falling 0   Is the gait abnormal? -   Number of falls in past 12 months 1   Fall with injury? 0       Abuse Screen  Abuse Screening Questionnaire 3/25/2019   Do you ever feel afraid of your partner? N   Are you in a relationship with someone who physically or mentally threatens you? N   Is it safe for you to go home?  Y         Patient Care Team   Patient Care Team:  Danyelle Espinal MD as PCP - General (Internal Medicine Physician)  Danyelle Espinal MD as PCP - [facility] Empaneled Provider  Vane Awad MD (Vascular Surgery)

## 2022-07-15 LAB — HCYS SERPL-SCNC: 9.9 UMOL/L (ref 3.7–13.9)

## 2022-07-18 LAB — LEVETIRACETAM SERPL-MCNC: 16.1 UG/ML (ref 10–40)

## 2022-10-18 DIAGNOSIS — E53.8 B12 DEFICIENCY: ICD-10-CM

## 2022-10-18 DIAGNOSIS — F02.80 DEMENTIA OF THE ALZHEIMER'S TYPE WITH LATE ONSET WITHOUT BEHAVIORAL DISTURBANCE (HCC): ICD-10-CM

## 2022-10-18 DIAGNOSIS — R55 VASOVAGAL SYNCOPE: ICD-10-CM

## 2022-10-18 DIAGNOSIS — I65.23 STENOSIS OF BOTH CAROTID ARTERIES WITHOUT CEREBRAL INFARCTION: ICD-10-CM

## 2022-10-18 DIAGNOSIS — I69.30 LATE EFFECT OF STROKE: ICD-10-CM

## 2022-10-18 DIAGNOSIS — I65.23 ASYMPTOMATIC CAROTID ARTERY STENOSIS, BILATERAL: ICD-10-CM

## 2022-10-18 DIAGNOSIS — G20 PARKINSON DISEASE (HCC): ICD-10-CM

## 2022-10-18 DIAGNOSIS — R56.9 CONVULSIONS, UNSPECIFIED CONVULSION TYPE (HCC): ICD-10-CM

## 2022-10-18 DIAGNOSIS — Z86.73 HISTORY OF CVA (CEREBROVASCULAR ACCIDENT): ICD-10-CM

## 2022-10-18 DIAGNOSIS — G30.1 DEMENTIA OF THE ALZHEIMER'S TYPE WITH LATE ONSET WITHOUT BEHAVIORAL DISTURBANCE (HCC): ICD-10-CM

## 2022-10-18 DIAGNOSIS — R27.0 ATAXIA: ICD-10-CM

## 2022-10-18 DIAGNOSIS — R41.3 MEMORY LOSS: ICD-10-CM

## 2022-10-18 DIAGNOSIS — I63.312 THROMBOTIC STROKE INVOLVING LEFT MIDDLE CEREBRAL ARTERY (HCC): ICD-10-CM

## 2022-10-18 DIAGNOSIS — G40.209 COMPLEX PARTIAL SEIZURE EVOLVING TO GENERALIZED SEIZURE (HCC): ICD-10-CM

## 2022-10-18 RX ORDER — CARBIDOPA AND LEVODOPA 25; 100 MG/1; MG/1
TABLET ORAL
Qty: 540 TABLET | Refills: 2 | Status: SHIPPED | OUTPATIENT
Start: 2022-10-18

## 2022-11-21 RX ORDER — FUROSEMIDE 20 MG/1
TABLET ORAL
Qty: 90 TABLET | Refills: 1 | Status: SHIPPED | OUTPATIENT
Start: 2022-11-21

## 2023-01-17 ENCOUNTER — OFFICE VISIT (OUTPATIENT)
Dept: INTERNAL MEDICINE CLINIC | Age: 88
End: 2023-01-17
Payer: MEDICARE

## 2023-01-17 VITALS
TEMPERATURE: 98.2 F | WEIGHT: 123 LBS | DIASTOLIC BLOOD PRESSURE: 78 MMHG | RESPIRATION RATE: 16 BRPM | BODY MASS INDEX: 18.22 KG/M2 | SYSTOLIC BLOOD PRESSURE: 138 MMHG | HEIGHT: 69 IN

## 2023-01-17 DIAGNOSIS — E53.8 B12 DEFICIENCY: ICD-10-CM

## 2023-01-17 DIAGNOSIS — E78.00 ELEVATED CHOLESTEROL: ICD-10-CM

## 2023-01-17 DIAGNOSIS — F02.80 DEMENTIA OF THE ALZHEIMER'S TYPE WITH LATE ONSET WITHOUT BEHAVIORAL DISTURBANCE (HCC): ICD-10-CM

## 2023-01-17 DIAGNOSIS — M35.3 PMR (POLYMYALGIA RHEUMATICA) (HCC): ICD-10-CM

## 2023-01-17 DIAGNOSIS — C61 PROSTATE CANCER (HCC): ICD-10-CM

## 2023-01-17 DIAGNOSIS — Z86.73 HISTORY OF CVA (CEREBROVASCULAR ACCIDENT): ICD-10-CM

## 2023-01-17 DIAGNOSIS — E72.11 HYPERHOMOCYSTEINEMIA (HCC): ICD-10-CM

## 2023-01-17 DIAGNOSIS — G40.909 SEIZURE DISORDER (HCC): ICD-10-CM

## 2023-01-17 DIAGNOSIS — G20 PARKINSONS DISEASE (HCC): ICD-10-CM

## 2023-01-17 DIAGNOSIS — G20 PARKINSON DISEASE (HCC): ICD-10-CM

## 2023-01-17 DIAGNOSIS — G30.1 DEMENTIA OF THE ALZHEIMER'S TYPE WITH LATE ONSET WITHOUT BEHAVIORAL DISTURBANCE (HCC): ICD-10-CM

## 2023-01-17 DIAGNOSIS — N31.8 DETRUSOR DYSFUNCTION: ICD-10-CM

## 2023-01-17 DIAGNOSIS — M48.02 CERVICAL SPINAL STENOSIS: ICD-10-CM

## 2023-01-17 DIAGNOSIS — I10 ESSENTIAL HYPERTENSION: Primary | Chronic | ICD-10-CM

## 2023-01-17 DIAGNOSIS — Z79.899 ON STATIN THERAPY: ICD-10-CM

## 2023-01-17 PROCEDURE — G8536 NO DOC ELDER MAL SCRN: HCPCS | Performed by: INTERNAL MEDICINE

## 2023-01-17 PROCEDURE — 1123F ACP DISCUSS/DSCN MKR DOCD: CPT | Performed by: INTERNAL MEDICINE

## 2023-01-17 PROCEDURE — 1101F PT FALLS ASSESS-DOCD LE1/YR: CPT | Performed by: INTERNAL MEDICINE

## 2023-01-17 PROCEDURE — G8419 CALC BMI OUT NRM PARAM NOF/U: HCPCS | Performed by: INTERNAL MEDICINE

## 2023-01-17 PROCEDURE — 99214 OFFICE O/P EST MOD 30 MIN: CPT | Performed by: INTERNAL MEDICINE

## 2023-01-17 PROCEDURE — G8427 DOCREV CUR MEDS BY ELIG CLIN: HCPCS | Performed by: INTERNAL MEDICINE

## 2023-01-17 PROCEDURE — G8510 SCR DEP NEG, NO PLAN REQD: HCPCS | Performed by: INTERNAL MEDICINE

## 2023-01-17 NOTE — PROGRESS NOTES
Elan Singletary is a 80 y.o. male presenting for Follow Up Chronic Condition (6 mo fu)  . 1. Have you been to the ER, urgent care clinic since your last visit? Hospitalized since your last visit? No    2. Have you seen or consulted any other health care providers outside of the [address] [place] [address] since your last visit? Include any pap smears or colon screening. No    Fall Risk Assessment, last 12 mths 1/12/2022   Able to walk? Yes   Fall in past 12 months? 1   Do you feel unsteady? 0   Are you worried about falling 0   Is the gait abnormal? -   Number of falls in past 12 months 1   Fall with injury? 0         Abuse Screening Questionnaire 3/25/2019   Do you ever feel afraid of your partner? N   Are you in a relationship with someone who physically or mentally threatens you? N   Is it safe for you to go home? Y       3 most recent PHQ Screens 1/17/2023   PHQ Not Done -   Little interest or pleasure in doing things Not at all   Feeling down, depressed, irritable, or hopeless Not at all   Total Score PHQ 2 0       There are no discontinued medications.

## 2023-01-17 NOTE — PROGRESS NOTES
Adele Miner is a 80 y.o. male and presents with Follow Up Chronic Condition (6 mo fu)  . Subjective:    Mr. Reed Nicely presents today for 6-month follow-up for several problems including Parkinson's disease, dementia related to Alzheimer's and/or Parkinson's, hypertension, hyperlipidemia, monitoring statin therapy, history of prostate cancer, or seizure disorder, overactive bladder, cerebrovascular disease, and cervical spinal stenosis. His weight is stable. His daughter notes that at times he will choke on his food. She has difficulty getting him to take his medications at times. He do not feel that the Myrbetriq has been very effective for him for overactive bladder. He waits until he has the urge to go to the bathroom and then has an accident. He has not been going to the bathroom on a schedule. He has no shortness of breath, chest pain, palpitations, PND, orthopnea, or pedal edema. Past Medical History:   Diagnosis Date    Arthritis of ankle 8/16/2017    Cancer [facility])     Prostate. Cellulitis 8/16/2017    Cerebrovascular disease 8/16/2017    Cervical spinal stenosis 5/13/2021    Detrusor dysfunction 8/16/2017    Edema 8/16/2017    Elevated cholesterol 8/16/2017    Fatigue 8/16/2017    Gastrointestinal disorder     ulcer    Hypertension     On statin therapy 8/16/2017    Parkinsons disease ([address] 8/16/2017    Pernicious anemia 8/16/2017    Prostate cancer ([facility] 8/16/2017    Raynaud disease 8/16/2017    Seizure disorder ([facility] 8/16/2017    Tobacco abuse 8/16/2017    Weight loss 8/16/2017     Past Surgical History:   Procedure Laterality Date    HX PROSTATECTOMY      LA UNLISTED PROCEDURE ABDOMEN PERITONEUM & OMENTUM      Ulcer.       Allergies   Allergen Reactions    Pcn [Penicillins] Swelling     Current Outpatient Medications   Medication Sig Dispense Refill    furosemide (LASIX) 20 mg tablet TAKE 1 TABLET BY MOUTH EVERY DAY 90 Tablet 1    carbidopa-levodopa (SINEMET)  mg per tablet TAKE 2 TABLETS BY MOUTH THREE (3) TIMES DAILY. 540 Tablet 2    amLODIPine (NORVASC) 5 mg tablet TAKE 1 TABLET BY MOUTH EVERY DAY 90 Tablet 2    rasagiline (AZILECT) 1 mg tablet TAKE 1 TABLET BY MOUTH EVERY DAY 90 Tablet 5    zonisamide (ZONEGRAN) 100 mg capsule TAKE 1 CAPSULE BY MOUTH EVERY DAY 90 Capsule 3    pravastatin (PRAVACHOL) 40 mg tablet TAKE 1 TABLET BY MOUTH EVERY DAY 90 Tablet 3    tamsulosin (FLOMAX) 0.4 mg capsule TAKE 1 CAPSULE BY MOUTH EVERY DAY 90 Capsule 3    aspirin-dipyridamole (AGGRENOX)  mg per SR capsule TAKE 1 CAPSULE BY MOUTH TWICE A  Capsule 4    levETIRAcetam (KEPPRA) 500 mg tablet TAKE 1 TABLET BY MOUTH EVERY MORNING AND 2 AT BEDTIME 270 Tablet 5    Myrbetriq 25 mg ER tablet TAKE 1 TABLET BY MOUTH EVERY DAY 90 Tablet 3    multivit with minerals/lutein (MULTIVITAMIN 50 PLUS PO) Take 1 Tab by mouth daily. Social History     Socioeconomic History    Marital status: SINGLE   Tobacco Use    Smoking status: Former     Packs/day: 0.50     Types: Cigarettes     Quit date: 4/15/2021     Years since quittin.7    Smokeless tobacco: Never   Vaping Use    Vaping Use: Never used   Substance and Sexual Activity    Alcohol use: No    Drug use: No    Sexual activity: Not Currently     Family History   Problem Relation Age of Onset    Dementia Mother     Stroke Father     Heart Disease Sister     Hypertension Sister     Hypertension Brother     OSTEOARTHRITIS Brother     Heart Disease Brother     COPD Brother     Hypertension Child        Review of Systems  Constitutional:  negative for fevers, chills, anorexia and weight loss  Eyes:    negative for visual disturbance and irritation  ENT:    negative for tinnitus,sore throat,nasal congestion,ear pains. hoarseness  Respiratory:     negative for cough, hemoptysis, dyspnea,wheezing  CV:    negative for chest pain, palpitations, lower extremity edema  GI:    negative for nausea, vomiting, diarrhea, abdominal pain,melena  Endo: negative for polyuria,polydipsia,polyphagia,heat intolerance  Genitourinary : negative for frequency, dysuria and hematuria  Integumentary: negative for rash and pruritus  Hematologic:   negative for easy bruising and gum/nose bleeding  Musculoskel:  negative for myalgias, arthralgias, back pain, joint pain  Neurological:   negative for headaches, dizziness, vertigo,  Behavl/Psych:  negative for feelings of anxiety, depression, mood changes  ROS otherwise negative      Objective:  Visit Vitals  /78 (BP 1 Location: Left upper arm, BP Patient Position: Sitting, BP Cuff Size: Adult)   Temp 98.2 °F (36.8 °C) (Oral)   Resp 16   Ht 5' 9\" (1.753 m)   Wt 123 lb (55.8 kg)   BMI 18.16 kg/m²     Physical Exam:   General appearance - alert, frail, and in no distress  Mental status - alert, oriented to person, place, and time  EYE-YARY, EOMI, fundi normal, corneas normal, no foreign bodies  ENT-ENT exam normal, no neck nodes or sinus tenderness  Nose - normal and patent, no erythema, discharge or polyps  Mouth - mucous membranes moist, pharynx normal without lesions  Neck - supple, no significant adenopathy   Chest - clear to auscultation, no wheezes, rales or rhonchi, symmetric air entry   Heart - normal rate, regular rhythm, normal S1, S2, no murmurs, rubs, clicks or gallops   Abdomen - soft, nontender, nondistended, no masses or organomegaly  Lymph- no adenopathy palpable  Ext-peripheral pulses normal, no pedal edema, no clubbing or cyanosis  Skin-Warm and dry. no hyperpigmentation, vitiligo, or suspicious lesions  Neuro -masked facies, mild cogwheeling, no tremor, generalized muscle weakness without focal deficit, trembling voice      Assessment/Plan:  Diagnoses and all orders for this visit:    1. Essential hypertension  -     LIPID PANEL; Future  -     METABOLIC PANEL, COMPREHENSIVE; Future    2. PMR (polymyalgia rheumatica) (HCC)    3. Parkinson disease ([facility]    4.  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 Seizure disorder ([facility]    6. Hyperhomocysteinemia ([facility]    7. Prostate cancer ([facility]    8. Parkinsons disease ([facility]    9. Detrusor dysfunction    10. History of CVA (cerebrovascular accident)    6. B12 deficiency    12. On statin therapy  -     CK; Future  -     LIPID PANEL; Future  -     METABOLIC PANEL, COMPREHENSIVE; Future    13. Elevated cholesterol  -     CK; Future  -     LIPID PANEL; Future  -     METABOLIC PANEL, COMPREHENSIVE; Future    14. Cervical spinal stenosis          ICD-10-CM ICD-9-CM    1. Essential hypertension  I10 401.9 LIPID PANEL      METABOLIC PANEL, COMPREHENSIVE      2. PMR (polymyalgia rheumatica) ([facility])  M35.3 725       3. Parkinson disease ([facility]  G20 332.0       4. 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 Seizure disorder ([facility]  G40.909 345.90       6. Hyperhomocysteinemia ([facility])  E72.11 270.4       7. Prostate cancer ([facility]  C61 185       8. Parkinsons disease ([facility]  G20 332.0       9. Detrusor dysfunction  N31.8 596.59       10. History of CVA (cerebrovascular accident)  Z86.73 V12.54       11. B12 deficiency  E53.8 266.2       12. On statin therapy  Z79.899 V58.69 CK      LIPID PANEL      METABOLIC PANEL, COMPREHENSIVE      13. Elevated cholesterol  E78.00 272.0 CK      LIPID PANEL      METABOLIC PANEL, COMPREHENSIVE      14. Cervical spinal stenosis  M48.02 723.0         Plan:    Recommend going to the bathroom on a schedule rather than waiting for the urge to go. He will discontinue Myrbetriq as this has not been very effective for him. Recommend taking time eating food and chewing and swallowing with sips of liquid in between. It has been recommended that he use thickener in his liquid previously but he is not doing so. He has not had any esme aspiration but if this becomes problematic may require further evaluation and speech therapy.         I have reviewed with the patient details of the assessment and plan and all questions were answered. Relevent patient education was performed. Verbal and/or written instructions (see AVS) provided. The most recent lab findings were reviewed with the patient. Plan was discussed with patient who verbally expressed understanding. An After Visit Summary was printed and given to the patient.     Trena Velazquez MD

## 2023-01-18 LAB
ALBUMIN SERPL-MCNC: 3.4 G/DL (ref 3.5–5)
ALBUMIN/GLOB SERPL: 0.9 (ref 1.1–2.2)
ALP SERPL-CCNC: 95 U/L (ref 45–117)
ALT SERPL-CCNC: 61 U/L (ref 12–78)
ANION GAP SERPL CALC-SCNC: 6 MMOL/L (ref 5–15)
AST SERPL-CCNC: 54 U/L (ref 15–37)
BILIRUB SERPL-MCNC: 0.3 MG/DL (ref 0.2–1)
BUN SERPL-MCNC: 29 MG/DL (ref 6–20)
BUN/CREAT SERPL: 22 (ref 12–20)
CALCIUM SERPL-MCNC: 9.1 MG/DL (ref 8.5–10.1)
CHLORIDE SERPL-SCNC: 115 MMOL/L (ref 97–108)
CHOLEST SERPL-MCNC: 146 MG/DL
CK SERPL-CCNC: 147 U/L (ref 39–308)
CO2 SERPL-SCNC: 24 MMOL/L (ref 21–32)
CREAT SERPL-MCNC: 1.32 MG/DL (ref 0.7–1.3)
GLOBULIN SER CALC-MCNC: 3.9 G/DL (ref 2–4)
GLUCOSE SERPL-MCNC: 58 MG/DL (ref 65–100)
HDLC SERPL-MCNC: 73 MG/DL
HDLC SERPL: 2 (ref 0–5)
LDLC SERPL CALC-MCNC: 59.2 MG/DL (ref 0–100)
POTASSIUM SERPL-SCNC: 6.1 MMOL/L (ref 3.5–5.1)
PROT SERPL-MCNC: 7.3 G/DL (ref 6.4–8.2)
SODIUM SERPL-SCNC: 145 MMOL/L (ref 136–145)
TRIGL SERPL-MCNC: 69 MG/DL (ref ?–150)
VLDLC SERPL CALC-MCNC: 13.8 MG/DL

## 2023-01-18 NOTE — PROGRESS NOTES
Labs are all stable except for potassium which is elevated at 6.1. Continue furosemide every day. Please give follow-up potassium scheduled as soon as possible.

## 2023-01-20 ENCOUNTER — LAB ONLY (OUTPATIENT)
Dept: INTERNAL MEDICINE CLINIC | Age: 88
End: 2023-01-20

## 2023-01-20 DIAGNOSIS — E87.5 HYPERKALEMIA: Primary | ICD-10-CM

## 2023-01-20 DIAGNOSIS — N18.30 STAGE 3 CHRONIC KIDNEY DISEASE, UNSPECIFIED WHETHER STAGE 3A OR 3B CKD (HCC): ICD-10-CM

## 2023-01-21 LAB — POTASSIUM SERPL-SCNC: 4.5 MMOL/L (ref 3.5–5.1)

## 2023-03-02 DIAGNOSIS — I69.30 LATE EFFECT OF STROKE: ICD-10-CM

## 2023-03-02 DIAGNOSIS — R41.3 MEMORY LOSS: ICD-10-CM

## 2023-03-02 DIAGNOSIS — R55 VASOVAGAL SYNCOPE: ICD-10-CM

## 2023-03-02 DIAGNOSIS — R27.0 ATAXIA: ICD-10-CM

## 2023-03-02 DIAGNOSIS — I65.23 STENOSIS OF BOTH CAROTID ARTERIES WITHOUT CEREBRAL INFARCTION: ICD-10-CM

## 2023-03-02 DIAGNOSIS — G30.1 DEMENTIA OF THE ALZHEIMER'S TYPE WITH LATE ONSET WITHOUT BEHAVIORAL DISTURBANCE (HCC): ICD-10-CM

## 2023-03-02 DIAGNOSIS — G40.209 COMPLEX PARTIAL SEIZURE EVOLVING TO GENERALIZED SEIZURE (HCC): ICD-10-CM

## 2023-03-02 DIAGNOSIS — I63.312 THROMBOTIC STROKE INVOLVING LEFT MIDDLE CEREBRAL ARTERY (HCC): ICD-10-CM

## 2023-03-02 DIAGNOSIS — F02.80 DEMENTIA OF THE ALZHEIMER'S TYPE WITH LATE ONSET WITHOUT BEHAVIORAL DISTURBANCE (HCC): ICD-10-CM

## 2023-03-02 DIAGNOSIS — R56.9 CONVULSIONS, UNSPECIFIED CONVULSION TYPE (HCC): ICD-10-CM

## 2023-03-02 DIAGNOSIS — Z86.73 HISTORY OF CVA (CEREBROVASCULAR ACCIDENT): ICD-10-CM

## 2023-03-02 DIAGNOSIS — E53.8 B12 DEFICIENCY: ICD-10-CM

## 2023-03-02 DIAGNOSIS — I65.23 ASYMPTOMATIC CAROTID ARTERY STENOSIS, BILATERAL: ICD-10-CM

## 2023-03-02 DIAGNOSIS — G20 PARKINSON DISEASE (HCC): ICD-10-CM

## 2023-03-03 RX ORDER — LEVETIRACETAM 500 MG/1
TABLET ORAL
Qty: 270 TABLET | Refills: 5 | Status: SHIPPED | OUTPATIENT
Start: 2023-03-03

## 2023-04-24 RX ORDER — AMLODIPINE BESYLATE 5 MG/1
TABLET ORAL
Qty: 90 TABLET | Refills: 2 | Status: SHIPPED | OUTPATIENT
Start: 2023-04-24

## 2023-04-25 DIAGNOSIS — E53.8 B12 DEFICIENCY: ICD-10-CM

## 2023-04-25 DIAGNOSIS — I63.312 THROMBOTIC STROKE INVOLVING LEFT MIDDLE CEREBRAL ARTERY (HCC): ICD-10-CM

## 2023-04-25 DIAGNOSIS — I69.30 LATE EFFECT OF STROKE: ICD-10-CM

## 2023-04-25 DIAGNOSIS — G20 PARKINSONS DISEASE (HCC): ICD-10-CM

## 2023-04-25 DIAGNOSIS — I65.23 ASYMPTOMATIC CAROTID ARTERY STENOSIS, BILATERAL: ICD-10-CM

## 2023-04-25 DIAGNOSIS — G20 PARKINSON DISEASE (HCC): ICD-10-CM

## 2023-04-25 DIAGNOSIS — R55 VASOVAGAL SYNCOPE: ICD-10-CM

## 2023-04-25 DIAGNOSIS — R41.3 MEMORY LOSS: ICD-10-CM

## 2023-04-25 DIAGNOSIS — R27.0 ATAXIA: ICD-10-CM

## 2023-04-25 DIAGNOSIS — G40.209 COMPLEX PARTIAL SEIZURE EVOLVING TO GENERALIZED SEIZURE (HCC): ICD-10-CM

## 2023-04-25 DIAGNOSIS — R56.9 CONVULSIONS, UNSPECIFIED CONVULSION TYPE (HCC): ICD-10-CM

## 2023-04-25 DIAGNOSIS — I65.23 STENOSIS OF BOTH CAROTID ARTERIES WITHOUT CEREBRAL INFARCTION: ICD-10-CM

## 2023-04-25 DIAGNOSIS — F02.80 DEMENTIA OF THE ALZHEIMER'S TYPE WITH LATE ONSET WITHOUT BEHAVIORAL DISTURBANCE (HCC): ICD-10-CM

## 2023-04-25 DIAGNOSIS — G30.1 DEMENTIA OF THE ALZHEIMER'S TYPE WITH LATE ONSET WITHOUT BEHAVIORAL DISTURBANCE (HCC): ICD-10-CM

## 2023-04-25 DIAGNOSIS — Z86.73 HISTORY OF CVA (CEREBROVASCULAR ACCIDENT): ICD-10-CM

## 2023-04-25 RX ORDER — ASPIRIN AND DIPYRIDAMOLE 25; 200 MG/1; MG/1
CAPSULE, EXTENDED RELEASE ORAL
Qty: 180 CAPSULE | Refills: 4 | Status: SHIPPED | OUTPATIENT
Start: 2023-04-25

## 2023-05-30 NOTE — TELEPHONE ENCOUNTER
Last Refill: Lasix 11-21-22, Flomax 4-4-22  Last Visit: 1/17/2023   Next Visit: 5/29/2023     Requested Prescriptions     Pending Prescriptions Disp Refills    furosemide (LASIX) 20 MG tablet [Pharmacy Med Name: FUROSEMIDE 20 MG TABLET] 90 tablet 3     Sig: TAKE 1 TABLET BY MOUTH EVERY DAY    tamsulosin (FLOMAX) 0.4 MG capsule [Pharmacy Med Name: TAMSULOSIN HCL 0.4 MG CAPSULE] 90 capsule 3     Sig: TAKE 1 CAPSULE BY MOUTH EVERY DAY

## 2023-05-30 NOTE — TELEPHONE ENCOUNTER
Last Refill: 4-4-22  Last Visit: 1/17/2023   Next Visit: 7/20/2023     Requested Prescriptions     Pending Prescriptions Disp Refills    pravastatin (PRAVACHOL) 40 MG tablet [Pharmacy Med Name: PRAVASTATIN SODIUM 40 MG TAB] 90 tablet 3     Sig: TAKE 1 TABLET BY MOUTH EVERY DAY

## 2023-05-31 RX ORDER — FUROSEMIDE 20 MG/1
TABLET ORAL
Qty: 90 TABLET | Refills: 3 | Status: SHIPPED | OUTPATIENT
Start: 2023-05-31

## 2023-05-31 RX ORDER — TAMSULOSIN HYDROCHLORIDE 0.4 MG/1
CAPSULE ORAL
Qty: 90 CAPSULE | Refills: 3 | Status: SHIPPED | OUTPATIENT
Start: 2023-05-31

## 2023-05-31 RX ORDER — PRAVASTATIN SODIUM 40 MG
TABLET ORAL
Qty: 90 TABLET | Refills: 3 | Status: SHIPPED | OUTPATIENT
Start: 2023-05-31

## 2023-07-10 DIAGNOSIS — G30.1 ALZHEIMER'S DISEASE WITH LATE ONSET (CODE) (HCC): ICD-10-CM

## 2023-07-10 DIAGNOSIS — G20 PARKINSON'S DISEASE (HCC): ICD-10-CM

## 2023-07-10 RX ORDER — ZONISAMIDE 100 MG/1
CAPSULE ORAL
Qty: 90 CAPSULE | Refills: 3 | Status: SHIPPED | OUTPATIENT
Start: 2023-07-10

## 2023-07-10 RX ORDER — RASAGILINE 1 MG/1
TABLET ORAL
Qty: 90 TABLET | Refills: 5 | Status: SHIPPED | OUTPATIENT
Start: 2023-07-10

## 2023-07-10 NOTE — TELEPHONE ENCOUNTER
Last office visit It has been three years since the patient was seen.     Last med refill   Rasagiline  6/19/2022  Zonisamide 6/18/2022

## 2023-07-20 ENCOUNTER — OFFICE VISIT (OUTPATIENT)
Facility: CLINIC | Age: 88
End: 2023-07-20
Payer: MEDICARE

## 2023-07-20 VITALS
DIASTOLIC BLOOD PRESSURE: 70 MMHG | HEART RATE: 72 BPM | BODY MASS INDEX: 20.08 KG/M2 | SYSTOLIC BLOOD PRESSURE: 128 MMHG | TEMPERATURE: 97.8 F | WEIGHT: 136 LBS

## 2023-07-20 DIAGNOSIS — Z00.00 MEDICARE ANNUAL WELLNESS VISIT, SUBSEQUENT: ICD-10-CM

## 2023-07-20 DIAGNOSIS — G40.909 SEIZURE DISORDER (HCC): ICD-10-CM

## 2023-07-20 DIAGNOSIS — E78.00 ELEVATED CHOLESTEROL: ICD-10-CM

## 2023-07-20 DIAGNOSIS — N18.30 STAGE 3 CHRONIC KIDNEY DISEASE, UNSPECIFIED WHETHER STAGE 3A OR 3B CKD (HCC): ICD-10-CM

## 2023-07-20 DIAGNOSIS — G20 PARKINSONS DISEASE (HCC): ICD-10-CM

## 2023-07-20 DIAGNOSIS — I10 ESSENTIAL HYPERTENSION: Primary | ICD-10-CM

## 2023-07-20 DIAGNOSIS — I67.9 CEREBROVASCULAR DISEASE: ICD-10-CM

## 2023-07-20 DIAGNOSIS — C61 PROSTATE CANCER (HCC): ICD-10-CM

## 2023-07-20 DIAGNOSIS — Z79.899 ON STATIN THERAPY: ICD-10-CM

## 2023-07-20 DIAGNOSIS — Z86.73 HISTORY OF CVA (CEREBROVASCULAR ACCIDENT): ICD-10-CM

## 2023-07-20 PROCEDURE — 1123F ACP DISCUSS/DSCN MKR DOCD: CPT | Performed by: INTERNAL MEDICINE

## 2023-07-20 PROCEDURE — G0439 PPPS, SUBSEQ VISIT: HCPCS | Performed by: INTERNAL MEDICINE

## 2023-07-20 ASSESSMENT — PATIENT HEALTH QUESTIONNAIRE - PHQ9: DEPRESSION UNABLE TO ASSESS: FUNCTIONAL CAPACITY MOTIVATION LIMITS ACCURACY

## 2023-07-20 ASSESSMENT — LIFESTYLE VARIABLES: HOW MANY STANDARD DRINKS CONTAINING ALCOHOL DO YOU HAVE ON A TYPICAL DAY: PATIENT DOES NOT DRINK

## 2023-07-20 NOTE — PROGRESS NOTES
Chief Complaint   Patient presents with    Medicare AW     [address]   1. Have you been to the ER, urgent care clinic since your last visit? Hospitalized since your last visit?no    2. Have you seen or consulted any other health care providers outside of the [address] since your last visit? Include any pap smears or colon screening.  no

## 2023-07-20 NOTE — PROGRESS NOTES
Medicare Annual Wellness Visit    [address] [place] [address]. is here for Medicare AWV ([address] [facility])    Assessment & Plan   Essential hypertension  -     Comprehensive Metabolic Panel; Future  -     Lipid Panel; Future  -     Urinalysis; Future  Cerebrovascular disease  -     Comprehensive Metabolic Panel; Future  -     Lipid Panel; Future  Parkinsons disease ([address]  Seizure disorder (HCC)  Prostate cancer ([address])  -     CBC with Auto Differential; Future  -     PSA, Diagnostic; Future  Stage 3 chronic kidney disease, unspecified whether stage 3a or 3b CKD (HCC)  -     CBC with Auto Differential; Future  -     Comprehensive Metabolic Panel; Future  -     Urinalysis; Future  Elevated cholesterol  -     CK; Future  -     Comprehensive Metabolic Panel; Future  -     Lipid Panel; Future  History of CVA (cerebrovascular accident)  On statin therapy  -     CK; Future  -     Comprehensive Metabolic Panel; Future  -     Lipid Panel; Future  Medicare annual wellness visit, subsequent    Recommendations for Preventive Services Due: see orders and patient instructions/AVS.  Recommended screening schedule for the next 5-10 years is provided to the patient in written form: see Patient Instructions/AVS.     Return in about 6 months (around 1/20/2024) for follow up. Subjective       Patient's complete Health Risk Assessment and screening values have been reviewed and are found in Flowsheets. The following problems were reviewed today and where indicated follow up appointments were made and/or referrals ordered. Positive Risk Factor Screenings with Interventions:    Fall Risk:  Do you feel unsteady or are you worried about falling? : (!) yes  2 or more falls in past year?: (!) yes  Fall with injury in past year?: no     Interventions:    See AVS for additional education material    Cognitive:    Words recalled: 0 Words Recalled   Clock Drawing Test (CDT): (!) Abnormal   Total Score: (!) 0   Total Score Interpretation: Abnormal Mini-Cog

## 2023-07-21 LAB
ALBUMIN SERPL-MCNC: 3.6 G/DL (ref 3.5–5)
ALBUMIN/GLOB SERPL: 1.1 (ref 1.1–2.2)
ALP SERPL-CCNC: 90 U/L (ref 45–117)
ALT SERPL-CCNC: 52 U/L (ref 12–78)
ANION GAP SERPL CALC-SCNC: 4 MMOL/L (ref 5–15)
AST SERPL-CCNC: 33 U/L (ref 15–37)
BASOPHILS # BLD: 0 K/UL (ref 0–0.1)
BASOPHILS NFR BLD: 0 % (ref 0–1)
BILIRUB SERPL-MCNC: 0.3 MG/DL (ref 0.2–1)
BUN SERPL-MCNC: 24 MG/DL (ref 6–20)
BUN/CREAT SERPL: 17 (ref 12–20)
CALCIUM SERPL-MCNC: 9.1 MG/DL (ref 8.5–10.1)
CHLORIDE SERPL-SCNC: 113 MMOL/L (ref 97–108)
CHOLEST SERPL-MCNC: 130 MG/DL
CK SERPL-CCNC: 66 U/L (ref 39–308)
CO2 SERPL-SCNC: 25 MMOL/L (ref 21–32)
CREAT SERPL-MCNC: 1.39 MG/DL (ref 0.7–1.3)
DIFFERENTIAL METHOD BLD: ABNORMAL
EOSINOPHIL # BLD: 0.1 K/UL (ref 0–0.4)
EOSINOPHIL NFR BLD: 1 % (ref 0–7)
ERYTHROCYTE [DISTWIDTH] IN BLOOD BY AUTOMATED COUNT: 15.7 % (ref 11.5–14.5)
GLOBULIN SER CALC-MCNC: 3.4 G/DL (ref 2–4)
GLUCOSE SERPL-MCNC: 84 MG/DL (ref 65–100)
HCT VFR BLD AUTO: 35.8 % (ref 36.6–50.3)
HDLC SERPL-MCNC: 67 MG/DL
HDLC SERPL: 1.9 (ref 0–5)
HGB BLD-MCNC: 11.1 G/DL (ref 12.1–17)
IMM GRANULOCYTES # BLD AUTO: 0 K/UL (ref 0–0.04)
IMM GRANULOCYTES NFR BLD AUTO: 0 % (ref 0–0.5)
LDLC SERPL CALC-MCNC: 52.6 MG/DL (ref 0–100)
LYMPHOCYTES # BLD: 1.2 K/UL (ref 0.8–3.5)
LYMPHOCYTES NFR BLD: 15 % (ref 12–49)
MCH RBC QN AUTO: 32.2 PG (ref 26–34)
MCHC RBC AUTO-ENTMCNC: 31 G/DL (ref 30–36.5)
MCV RBC AUTO: 103.8 FL (ref 80–99)
MONOCYTES # BLD: 0.6 K/UL (ref 0–1)
MONOCYTES NFR BLD: 7 % (ref 5–13)
NEUTS SEG # BLD: 6.4 K/UL (ref 1.8–8)
NEUTS SEG NFR BLD: 78 % (ref 32–75)
NRBC # BLD: 0 K/UL (ref 0–0.01)
NRBC BLD-RTO: 0 PER 100 WBC
PLATELET # BLD AUTO: 173 K/UL (ref 150–400)
POTASSIUM SERPL-SCNC: 4.2 MMOL/L (ref 3.5–5.1)
PROT SERPL-MCNC: 7 G/DL (ref 6.4–8.2)
PSA SERPL-MCNC: 0 NG/ML (ref 0.01–4)
RBC # BLD AUTO: 3.45 M/UL (ref 4.1–5.7)
SODIUM SERPL-SCNC: 142 MMOL/L (ref 136–145)
TRIGL SERPL-MCNC: 52 MG/DL
VLDLC SERPL CALC-MCNC: 10.4 MG/DL
WBC # BLD AUTO: 8.3 K/UL (ref 4.1–11.1)

## 2023-10-26 DIAGNOSIS — E53.8 DEFICIENCY OF OTHER SPECIFIED B GROUP VITAMINS: ICD-10-CM

## 2023-10-26 DIAGNOSIS — G20.A1 PARKINSON'S DISEASE: ICD-10-CM

## 2024-01-17 RX ORDER — AMLODIPINE BESYLATE 5 MG/1
5 TABLET ORAL DAILY
Qty: 90 TABLET | Refills: 2 | Status: SHIPPED | OUTPATIENT
Start: 2024-01-17

## 2024-01-17 NOTE — TELEPHONE ENCOUNTER
PCP: BRENDA Purcell MD    Last appt: 7/20/2023    Future Appointments   Date Time Provider Department Center   1/29/2024  8:15 AM BRENDA Purcell MD [facility]       Requested Prescriptions     Pending Prescriptions Disp Refills    amLODIPine (NORVASC) 5 MG tablet [Pharmacy Med Name: AMLODIPINE BESYLATE 5 MG TAB] 90 tablet 2     Sig: TAKE 1 TABLET BY MOUTH EVERY DAY

## 2024-01-29 ENCOUNTER — OFFICE VISIT (OUTPATIENT)
Facility: CLINIC | Age: 89
End: 2024-01-29
Payer: MEDICARE

## 2024-01-29 VITALS
BODY MASS INDEX: 20.14 KG/M2 | DIASTOLIC BLOOD PRESSURE: 76 MMHG | TEMPERATURE: 98.1 F | HEIGHT: 69 IN | HEART RATE: 64 BPM | WEIGHT: 136 LBS | SYSTOLIC BLOOD PRESSURE: 136 MMHG | RESPIRATION RATE: 14 BRPM

## 2024-01-29 DIAGNOSIS — Z00.00 MEDICARE ANNUAL WELLNESS VISIT, SUBSEQUENT: ICD-10-CM

## 2024-01-29 DIAGNOSIS — I67.9 CEREBROVASCULAR DISEASE: ICD-10-CM

## 2024-01-29 DIAGNOSIS — Z86.73 HISTORY OF CVA (CEREBROVASCULAR ACCIDENT): ICD-10-CM

## 2024-01-29 DIAGNOSIS — N18.30 STAGE 3 CHRONIC KIDNEY DISEASE, UNSPECIFIED WHETHER STAGE 3A OR 3B CKD (HCC): ICD-10-CM

## 2024-01-29 DIAGNOSIS — I65.23 STENOSIS OF BOTH CAROTID ARTERIES WITHOUT CEREBRAL INFARCTION: ICD-10-CM

## 2024-01-29 DIAGNOSIS — G20.B1 PARKINSON'S DISEASE WITH DYSKINESIA WITHOUT FLUCTUATING MANIFESTATIONS: ICD-10-CM

## 2024-01-29 DIAGNOSIS — Z79.899 ON STATIN THERAPY: ICD-10-CM

## 2024-01-29 DIAGNOSIS — I10 ESSENTIAL HYPERTENSION: Primary | ICD-10-CM

## 2024-01-29 DIAGNOSIS — E78.00 ELEVATED CHOLESTEROL: ICD-10-CM

## 2024-01-29 DIAGNOSIS — N31.8 DETRUSOR DYSFUNCTION: ICD-10-CM

## 2024-01-29 DIAGNOSIS — G40.909 SEIZURE DISORDER (HCC): ICD-10-CM

## 2024-01-29 DIAGNOSIS — C61 PROSTATE CANCER (HCC): ICD-10-CM

## 2024-01-29 PROCEDURE — 1123F ACP DISCUSS/DSCN MKR DOCD: CPT | Performed by: INTERNAL MEDICINE

## 2024-01-29 PROCEDURE — G0439 PPPS, SUBSEQ VISIT: HCPCS | Performed by: INTERNAL MEDICINE

## 2024-01-29 ASSESSMENT — PATIENT HEALTH QUESTIONNAIRE - PHQ9
SUM OF ALL RESPONSES TO PHQ QUESTIONS 1-9: 0
1. LITTLE INTEREST OR PLEASURE IN DOING THINGS: 0
SUM OF ALL RESPONSES TO PHQ QUESTIONS 1-9: 0
2. FEELING DOWN, DEPRESSED OR HOPELESS: 0
SUM OF ALL RESPONSES TO PHQ9 QUESTIONS 1 & 2: 0

## 2024-01-29 ASSESSMENT — LIFESTYLE VARIABLES
HOW OFTEN DO YOU HAVE A DRINK CONTAINING ALCOHOL: NEVER
HOW MANY STANDARD DRINKS CONTAINING ALCOHOL DO YOU HAVE ON A TYPICAL DAY: PATIENT DOES NOT DRINK

## 2024-01-29 NOTE — PROGRESS NOTES
Dustin MIRAMONTES Yancy Herr. is a 89 y.o. male presenting for 6 Month Follow-Up and Medicare AWV  .     \"Have you been to the ER, urgent care clinic since your last visit?  Hospitalized since your last visit?\"    NO    “Have you seen or consulted any other health care providers outside of [facility] since your last visit?”    No                                   
Interventions:  See AVS for additional education material        Dentist Screen:  Have you seen the dentist within the past year?: (!) No    Intervention:  See AVS for additional education material     Vision Screen:  Do you have difficulty driving, watching TV, or doing any of your daily activities because of your eyesight?: No  Have you had an eye exam within the past year?: (!) No  No results found.    Interventions:   See AVS for additional education material     ADL's:   Patient reports needing help with:  Select all that apply: (!) Eating, Dressing, Grooming, Bathing, Toileting, Walking/Balance  Select all that apply: (!) Laundry, Housekeeping, Banking/Finances, Shopping, Telephone Use, Food Preparation, Transportation, Taking Medications  Interventions:  See AVS for additional education material    Advanced Directives:  Do you have a Living Will?: (!) No    Intervention:  has NO advanced directive - information provided                     Objective   Vitals:    01/29/24 0834   BP: 136/76   Site: Left Upper Arm   Position: Sitting   Cuff Size: Large Adult   Pulse: 64   Resp: 14   Temp: 98.1 °F (36.7 °C)   TempSrc: Oral   Weight: 61.7 kg (136 lb)   Height: 1.753 m (5' 9\")      Body mass index is 20.08 kg/m².               Allergies   Allergen Reactions    Penicillins Swelling     Prior to Visit Medications    Medication Sig Taking? Authorizing Provider   amLODIPine (NORVASC) 5 MG tablet TAKE 1 TABLET BY MOUTH EVERY DAY Yes BRENDA Purcell MD   carbidopa-levodopa (SINEMET)  MG per tablet TAKE 2 TABLETS BY MOUTH THREE (3) TIMES DAILY. Yes Dao Dempesy MD   zonisamide (ZONEGRAN) 100 MG capsule TAKE 1 CAPSULE BY MOUTH EVERY DAY Yes Dao Dempsey MD   rasagiline mesylate 1 MG TABS TAKE 1 TABLET BY MOUTH EVERY DAY Yes Dao Dempsey MD   furosemide (LASIX) 20 MG tablet TAKE 1 TABLET BY MOUTH EVERY DAY Yes BRENDA Purcell MD   tamsulosin (FLOMAX) 0.4 MG capsule TAKE 1 CAPSULE BY MOUTH EVERY DAY

## 2024-01-30 LAB
ALBUMIN SERPL-MCNC: 3.6 G/DL (ref 3.5–5)
ALBUMIN/GLOB SERPL: 0.9 (ref 1.1–2.2)
ALP SERPL-CCNC: 91 U/L (ref 45–117)
ALT SERPL-CCNC: 40 U/L (ref 12–78)
ANION GAP SERPL CALC-SCNC: 3 MMOL/L (ref 5–15)
AST SERPL-CCNC: 46 U/L (ref 15–37)
BASOPHILS # BLD: 0 K/UL (ref 0–0.1)
BASOPHILS NFR BLD: 0 % (ref 0–1)
BILIRUB SERPL-MCNC: 0.4 MG/DL (ref 0.2–1)
BUN SERPL-MCNC: 29 MG/DL (ref 6–20)
BUN/CREAT SERPL: 20 (ref 12–20)
CALCIUM SERPL-MCNC: 9.2 MG/DL (ref 8.5–10.1)
CHLORIDE SERPL-SCNC: 111 MMOL/L (ref 97–108)
CHOLEST SERPL-MCNC: 158 MG/DL
CK SERPL-CCNC: 68 U/L (ref 39–308)
CO2 SERPL-SCNC: 29 MMOL/L (ref 21–32)
CREAT SERPL-MCNC: 1.46 MG/DL (ref 0.7–1.3)
DIFFERENTIAL METHOD BLD: ABNORMAL
EOSINOPHIL # BLD: 0.1 K/UL (ref 0–0.4)
EOSINOPHIL NFR BLD: 1 % (ref 0–7)
ERYTHROCYTE [DISTWIDTH] IN BLOOD BY AUTOMATED COUNT: 15.1 % (ref 11.5–14.5)
GLOBULIN SER CALC-MCNC: 3.9 G/DL (ref 2–4)
GLUCOSE SERPL-MCNC: 95 MG/DL (ref 65–100)
HCT VFR BLD AUTO: 31.5 % (ref 36.6–50.3)
HDLC SERPL-MCNC: 70 MG/DL
HDLC SERPL: 2.3 (ref 0–5)
HGB BLD-MCNC: 10.4 G/DL (ref 12.1–17)
IMM GRANULOCYTES # BLD AUTO: 0 K/UL (ref 0–0.04)
IMM GRANULOCYTES NFR BLD AUTO: 0 % (ref 0–0.5)
LDLC SERPL CALC-MCNC: 78.4 MG/DL (ref 0–100)
LYMPHOCYTES # BLD: 1.2 K/UL (ref 0.8–3.5)
LYMPHOCYTES NFR BLD: 14 % (ref 12–49)
MCH RBC QN AUTO: 32.6 PG (ref 26–34)
MCHC RBC AUTO-ENTMCNC: 33 G/DL (ref 30–36.5)
MCV RBC AUTO: 98.7 FL (ref 80–99)
MONOCYTES # BLD: 0.5 K/UL (ref 0–1)
MONOCYTES NFR BLD: 6 % (ref 5–13)
NEUTS SEG # BLD: 6.9 K/UL (ref 1.8–8)
NEUTS SEG NFR BLD: 79 % (ref 32–75)
NRBC # BLD: 0 K/UL (ref 0–0.01)
NRBC BLD-RTO: 0 PER 100 WBC
PLATELET # BLD AUTO: 218 K/UL (ref 150–400)
POTASSIUM SERPL-SCNC: 5.4 MMOL/L (ref 3.5–5.1)
PROT SERPL-MCNC: 7.5 G/DL (ref 6.4–8.2)
RBC # BLD AUTO: 3.19 M/UL (ref 4.1–5.7)
SODIUM SERPL-SCNC: 143 MMOL/L (ref 136–145)
TRIGL SERPL-MCNC: 48 MG/DL
VLDLC SERPL CALC-MCNC: 9.6 MG/DL
WBC # BLD AUTO: 8.7 K/UL (ref 4.1–11.1)

## 2024-02-01 LAB — LEVETIRACETAM SERPL-MCNC: 29 UG/ML (ref 10–40)

## 2024-04-08 DIAGNOSIS — G30.1 ALZHEIMER'S DISEASE WITH LATE ONSET (CODE) (HCC): ICD-10-CM

## 2024-04-08 DIAGNOSIS — G20.A1 PARKINSON'S DISEASE (HCC): ICD-10-CM

## 2024-04-08 RX ORDER — LEVETIRACETAM 500 MG/1
TABLET ORAL
Qty: 270 TABLET | Refills: 5 | Status: SHIPPED | OUTPATIENT
Start: 2024-04-08

## 2024-04-08 NOTE — TELEPHONE ENCOUNTER
Last office visit 12/4/2018  Next office visit no future appointments scheduled  Last med refill 3/3/2023

## 2024-06-07 RX ORDER — FUROSEMIDE 20 MG/1
TABLET ORAL
Qty: 90 TABLET | Refills: 3 | Status: SHIPPED | OUTPATIENT
Start: 2024-06-07

## 2024-06-07 RX ORDER — TAMSULOSIN HYDROCHLORIDE 0.4 MG/1
CAPSULE ORAL
Qty: 90 CAPSULE | Refills: 3 | Status: SHIPPED | OUTPATIENT
Start: 2024-06-07

## 2024-06-07 RX ORDER — PRAVASTATIN SODIUM 40 MG
TABLET ORAL
Qty: 90 TABLET | Refills: 3 | Status: SHIPPED | OUTPATIENT
Start: 2024-06-07

## 2024-06-07 NOTE — TELEPHONE ENCOUNTER
PCP: BRENDA Purcell MD    Last appt: 1/29/2024    Future Appointments   Date Time Provider Department Center   7/30/2024  8:15 AM [facility]       Requested Prescriptions     Pending Prescriptions Disp Refills    pravastatin (PRAVACHOL) 40 MG tablet [Pharmacy Med Name: PRAVASTATIN SODIUM 40 MG TAB] 90 tablet 3     Sig: TAKE 1 TABLET BY MOUTH EVERY DAY    tamsulosin (FLOMAX) 0.4 MG capsule [Pharmacy Med Name: TAMSULOSIN HCL 0.4 MG CAPSULE] 90 capsule 3     Sig: TAKE 1 CAPSULE BY MOUTH EVERY DAY    furosemide (LASIX) 20 MG tablet [Pharmacy Med Name: FUROSEMIDE 20 MG TABLET] 90 tablet 3     Sig: TAKE 1 TABLET BY MOUTH EVERY DAY

## 2024-07-05 DIAGNOSIS — G20.A1 PARKINSON'S DISEASE (HCC): ICD-10-CM

## 2024-07-05 DIAGNOSIS — G30.1 ALZHEIMER'S DISEASE WITH LATE ONSET (CODE) (HCC): ICD-10-CM

## 2024-07-05 RX ORDER — ZONISAMIDE 100 MG/1
CAPSULE ORAL
Qty: 90 CAPSULE | Refills: 3 | OUTPATIENT
Start: 2024-07-05

## 2024-07-05 NOTE — TELEPHONE ENCOUNTER
Last office visit: 12/04/2018  Next office visit: No future visit scheduled   Last refill: 07/10/2023

## 2024-07-23 DIAGNOSIS — E53.8 DEFICIENCY OF OTHER SPECIFIED B GROUP VITAMINS: ICD-10-CM

## 2024-07-23 DIAGNOSIS — G20.A1 PARKINSON'S DISEASE (HCC): ICD-10-CM

## 2024-07-23 RX ORDER — ASPIRIN AND DIPYRIDAMOLE 25; 200 MG/1; MG/1
1 CAPSULE, EXTENDED RELEASE ORAL 2 TIMES DAILY
Qty: 180 CAPSULE | Refills: 4 | OUTPATIENT
Start: 2024-07-23

## 2024-07-30 ENCOUNTER — OFFICE VISIT (OUTPATIENT)
Facility: CLINIC | Age: 89
End: 2024-07-30
Payer: MEDICARE

## 2024-07-30 VITALS
HEIGHT: 69 IN | BODY MASS INDEX: 18.6 KG/M2 | RESPIRATION RATE: 14 BRPM | WEIGHT: 125.6 LBS | TEMPERATURE: 98.1 F | DIASTOLIC BLOOD PRESSURE: 74 MMHG | SYSTOLIC BLOOD PRESSURE: 134 MMHG

## 2024-07-30 DIAGNOSIS — F02.80 DEMENTIA OF THE ALZHEIMER'S TYPE WITH LATE ONSET WITHOUT BEHAVIORAL DISTURBANCE (HCC): ICD-10-CM

## 2024-07-30 DIAGNOSIS — G20.B1 PARKINSON'S DISEASE WITH DYSKINESIA WITHOUT FLUCTUATING MANIFESTATIONS (HCC): ICD-10-CM

## 2024-07-30 DIAGNOSIS — Z79.899 ON STATIN THERAPY: ICD-10-CM

## 2024-07-30 DIAGNOSIS — N18.30 STAGE 3 CHRONIC KIDNEY DISEASE, UNSPECIFIED WHETHER STAGE 3A OR 3B CKD (HCC): ICD-10-CM

## 2024-07-30 DIAGNOSIS — I67.9 CEREBROVASCULAR DISEASE: ICD-10-CM

## 2024-07-30 DIAGNOSIS — C61 PROSTATE CANCER (HCC): ICD-10-CM

## 2024-07-30 DIAGNOSIS — G30.1 DEMENTIA OF THE ALZHEIMER'S TYPE WITH LATE ONSET WITHOUT BEHAVIORAL DISTURBANCE (HCC): ICD-10-CM

## 2024-07-30 DIAGNOSIS — E78.00 ELEVATED CHOLESTEROL: ICD-10-CM

## 2024-07-30 DIAGNOSIS — M48.02 CERVICAL SPINAL STENOSIS: ICD-10-CM

## 2024-07-30 DIAGNOSIS — I10 ESSENTIAL HYPERTENSION: Primary | ICD-10-CM

## 2024-07-30 DIAGNOSIS — G40.909 SEIZURE DISORDER (HCC): ICD-10-CM

## 2024-07-30 DIAGNOSIS — N31.8 DETRUSOR DYSFUNCTION: ICD-10-CM

## 2024-07-30 LAB
ALBUMIN SERPL-MCNC: 3.6 G/DL (ref 3.5–5)
ALBUMIN/GLOB SERPL: 0.9 (ref 1.1–2.2)
ALP SERPL-CCNC: 96 U/L (ref 45–117)
ALT SERPL-CCNC: 21 U/L (ref 12–78)
ANION GAP SERPL CALC-SCNC: 3 MMOL/L (ref 5–15)
AST SERPL-CCNC: 35 U/L (ref 15–37)
BILIRUB SERPL-MCNC: 0.4 MG/DL (ref 0.2–1)
BUN SERPL-MCNC: 26 MG/DL (ref 6–20)
BUN/CREAT SERPL: 18 (ref 12–20)
CALCIUM SERPL-MCNC: 9.2 MG/DL (ref 8.5–10.1)
CHLORIDE SERPL-SCNC: 111 MMOL/L (ref 97–108)
CHOLEST SERPL-MCNC: 140 MG/DL
CK SERPL-CCNC: 67 U/L (ref 39–308)
CO2 SERPL-SCNC: 27 MMOL/L (ref 21–32)
CREAT SERPL-MCNC: 1.48 MG/DL (ref 0.7–1.3)
GLOBULIN SER CALC-MCNC: 3.9 G/DL (ref 2–4)
GLUCOSE SERPL-MCNC: 97 MG/DL (ref 65–100)
HDLC SERPL-MCNC: 60 MG/DL
HDLC SERPL: 2.3 (ref 0–5)
LDLC SERPL CALC-MCNC: 66.4 MG/DL (ref 0–100)
POTASSIUM SERPL-SCNC: 4.6 MMOL/L (ref 3.5–5.1)
PROT SERPL-MCNC: 7.5 G/DL (ref 6.4–8.2)
PSA SERPL-MCNC: 0 NG/ML (ref 0.01–4)
SODIUM SERPL-SCNC: 141 MMOL/L (ref 136–145)
TRIGL SERPL-MCNC: 68 MG/DL
VLDLC SERPL CALC-MCNC: 13.6 MG/DL

## 2024-07-30 PROCEDURE — 99214 OFFICE O/P EST MOD 30 MIN: CPT | Performed by: INTERNAL MEDICINE

## 2024-07-30 PROCEDURE — 1123F ACP DISCUSS/DSCN MKR DOCD: CPT | Performed by: INTERNAL MEDICINE

## 2024-07-30 SDOH — ECONOMIC STABILITY: HOUSING INSECURITY
IN THE LAST 12 MONTHS, WAS THERE A TIME WHEN YOU DID NOT HAVE A STEADY PLACE TO SLEEP OR SLEPT IN A SHELTER (INCLUDING NOW)?: NO

## 2024-07-30 SDOH — ECONOMIC STABILITY: FOOD INSECURITY: WITHIN THE PAST 12 MONTHS, THE FOOD YOU BOUGHT JUST DIDN'T LAST AND YOU DIDN'T HAVE MONEY TO GET MORE.: NEVER TRUE

## 2024-07-30 SDOH — ECONOMIC STABILITY: FOOD INSECURITY: WITHIN THE PAST 12 MONTHS, YOU WORRIED THAT YOUR FOOD WOULD RUN OUT BEFORE YOU GOT MONEY TO BUY MORE.: NEVER TRUE

## 2024-07-30 SDOH — ECONOMIC STABILITY: INCOME INSECURITY: HOW HARD IS IT FOR YOU TO PAY FOR THE VERY BASICS LIKE FOOD, HOUSING, MEDICAL CARE, AND HEATING?: NOT HARD AT ALL

## 2024-07-30 NOTE — PROGRESS NOTES
Dustin Haines . is a 89 y.o. male and presents with 6 Month Follow-Up  .    Subjective:  Mr. Haines presents today for 6-month follow-up accompanied by his daughter.  His history significant for hypertension, history of CVA, Parkinson's disease with dementia, seizure disorder, history of prostate cancer, detrusor dysfunction, chronic renal insufficiency, hyperlipidemia, cervical spinal stenosis, and monitoring statin therapy.  His daughter notes that he takes his medications sporadically.  He does not have much of an appetite and has lost about 11 pounds since his last visit here.  He denies any specific complaint.  He has no shortness of breath, chest pain, palpitations, PND, orthopnea, or pedal edema.  His daughter assists him with ADLs.    Past Medical History:   Diagnosis Date    Arthritis of ankle 8/16/2017    Cancer ([facility])     Prostate.    Cellulitis 8/16/2017    Cerebrovascular disease 8/16/2017    Cervical spinal stenosis 5/13/2021    Detrusor dysfunction 8/16/2017    Edema 8/16/2017    Elevated cholesterol 8/16/2017    Fatigue 8/16/2017    Gastrointestinal disorder     ulcer    Hypertension     On statin therapy 8/16/2017    Parkinsons disease (HCC) 8/16/2017    Pernicious anemia 8/16/2017    Prostate cancer ([facility]) 8/16/2017    Raynaud disease 8/16/2017    Seizure disorder ([facility]) 8/16/2017    Tobacco abuse 8/16/2017    Weight loss 8/16/2017     Past Surgical History:   Procedure Laterality Date    AR UNLISTED PROCEDURE ABDOMEN PERITONEUM & OMENTUM      Ulcer.     PROSTATECTOMY       Allergies   Allergen Reactions    Penicillins Swelling     Current Outpatient Medications   Medication Sig Dispense Refill    pravastatin (PRAVACHOL) 40 MG tablet TAKE 1 TABLET BY MOUTH EVERY DAY 90 tablet 3    tamsulosin (FLOMAX) 0.4 MG capsule TAKE 1 CAPSULE BY MOUTH EVERY DAY 90 capsule 3    furosemide (LASIX) 20 MG tablet TAKE 1 TABLET BY MOUTH EVERY DAY 90 tablet 3    levETIRAcetam (KEPPRA) 500 MG tablet TAKE 1

## 2024-07-30 NOTE — PROGRESS NOTES
Dustin FE Haines Sr. is a 89 y.o. male     Chief Complaint   Patient presents with    6 Month Follow-Up       /74 (Site: Left Upper Arm, Position: Sitting, Cuff Size: Medium Adult)   Temp 98.1 °F (36.7 °C) (Oral)   Resp 14   Ht 1.753 m (5' 9\")   Wt 57 kg (125 lb 9.6 oz)   BMI 18.55 kg/m²     Health Maintenance Due   Topic Date Due    DTaP/Tdap/Td vaccine (1 - Tdap) Never done    Shingles vaccine (1 of 2) Never done    Respiratory Syncytial Virus (RSV) Pregnant or age 60 yrs+ (1 - 1-dose 60+ series) Never done    Prostate Specific Antigen (PSA) Screening or Monitoring  07/20/2024         \"Have you been to the ER, urgent care clinic since your last visit?  Hospitalized since your last visit?\"    NO    “Have you seen or consulted any other health care providers outside of [facility] since your last visit?”    NO

## 2024-08-01 LAB — LEVETIRACETAM SERPL-MCNC: 22.9 UG/ML (ref 10–40)

## 2024-08-26 RX ORDER — ASPIRIN AND DIPYRIDAMOLE 25; 200 MG/1; MG/1
1 CAPSULE, EXTENDED RELEASE ORAL 2 TIMES DAILY
Qty: 180 CAPSULE | Refills: 4 | OUTPATIENT
Start: 2024-08-26

## 2024-08-26 NOTE — TELEPHONE ENCOUNTER
Last office visit: 12/04/2018  Next office visit: no future visit scheduled   Last med refill: 03/26/2023

## 2024-08-27 ENCOUNTER — TELEPHONE (OUTPATIENT)
Facility: CLINIC | Age: 89
End: 2024-08-27

## 2024-08-27 NOTE — TELEPHONE ENCOUNTER
Patient's daughter called in stating that this medication previously was managed by the patient's neurologist. However, he has not seen him in sometime and is unable to get this prescription as the next available appointment is 6 months to a year out. Requesting to see if this medication can be filled by Dr. Purcell now and in the future if possible. Please advise.     Pharmacy: [facility] [address] [address]    aspirin-dipyridamole (AGGRENOX)  MG per extended release capsule [[id number]]    Order Details  Dose: 1 capsule Route: Oral Frequency: 2 TIMES DAILY   Dispense Quantity: -- Refills: --          Sig: Take 1 capsule by mouth 2 times daily

## 2024-08-29 RX ORDER — ASPIRIN AND DIPYRIDAMOLE 25; 200 MG/1; MG/1
1 CAPSULE, EXTENDED RELEASE ORAL 2 TIMES DAILY
Qty: 180 CAPSULE | Refills: 4 | OUTPATIENT
Start: 2024-08-29

## 2024-08-29 NOTE — TELEPHONE ENCOUNTER
Disp Refills Start End    aspirin-dipyridamole (AGGRENOX)  mg per SR capsule 180 Capsule 4 4/25/2023 --    Sig: TAKE 1 CAPSULE BY MOUTH TWICE A DAY      Patients daughter Deanna Winston called to see if Dr. Purcell would take over this medication for her father.  Patient has not seen Dr. Dempsey for over 4 years.  He is scheduled to see Dr. Dempsey May 1, 2025.    Pharmacy [facility] [address]    Last visit 7/30/24  Future appt 1/31/25

## 2024-08-30 RX ORDER — ASPIRIN AND DIPYRIDAMOLE 25; 200 MG/1; MG/1
1 CAPSULE, EXTENDED RELEASE ORAL 2 TIMES DAILY
Qty: 180 CAPSULE | Refills: 0 | Status: SHIPPED | OUTPATIENT
Start: 2024-08-30

## 2024-08-30 NOTE — TELEPHONE ENCOUNTER
PCP: BRENDA Purcell MD    Last appt: 7/30/2024    Future Appointments   Date Time Provider Department Center   1/31/2025  9:00 AM BRENDA Purcell MD [facility]   5/1/2025  9:00 AM [facility]       Requested Prescriptions     Pending Prescriptions Disp Refills    aspirin-dipyridamole (AGGRENOX)  MG per extended release capsule 180 capsule 4     Sig: Take 1 capsule by mouth 2 times daily

## 2024-09-03 ENCOUNTER — TELEPHONE (OUTPATIENT)
Facility: CLINIC | Age: 89
End: 2024-09-03

## 2024-09-03 RX ORDER — ASPIRIN AND DIPYRIDAMOLE 25; 200 MG/1; MG/1
1 CAPSULE, EXTENDED RELEASE ORAL 2 TIMES DAILY
Qty: 180 CAPSULE | Refills: 1 | Status: SHIPPED | OUTPATIENT
Start: 2024-09-03

## 2024-09-03 NOTE — TELEPHONE ENCOUNTER
Called patient and spoke with daughter, letting her know pt has to be seen in order to get further refill(s). Patient has been scheduled for 9/16 @11:15 am.

## 2024-09-03 NOTE — TELEPHONE ENCOUNTER
PCP: BRENDA Purcell MD    Last appt: Visit date not found    Future Appointments   Date Time Provider Department Center   1/31/2025  9:00 AM BRENDA Purcell MD [facility]   5/1/2025  9:00 AM Dao Dempsey MD [facility]       Requested Prescriptions     Pending Prescriptions Disp Refills    aspirin-dipyridamole (AGGRENOX)  MG per extended release capsule [Pharmacy Med Name: ASPIRIN-DIPYRIDAM ER  MG] 180 capsule 1     Sig: TAKE 1 CAPSULE BY MOUTH TWICE A DAY

## 2024-09-11 RX ORDER — AMLODIPINE BESYLATE 5 MG/1
5 TABLET ORAL DAILY
Qty: 90 TABLET | Refills: 2 | Status: SHIPPED | OUTPATIENT
Start: 2024-09-11

## 2024-09-16 ENCOUNTER — OFFICE VISIT (OUTPATIENT)
Facility: CLINIC | Age: 89
End: 2024-09-16
Payer: MEDICARE

## 2024-09-16 VITALS
DIASTOLIC BLOOD PRESSURE: 78 MMHG | OXYGEN SATURATION: 74 % | BODY MASS INDEX: 18.37 KG/M2 | WEIGHT: 124.4 LBS | SYSTOLIC BLOOD PRESSURE: 128 MMHG | TEMPERATURE: 97.7 F

## 2024-09-16 DIAGNOSIS — I10 ESSENTIAL HYPERTENSION: Primary | ICD-10-CM

## 2024-09-16 DIAGNOSIS — N18.30 STAGE 3 CHRONIC KIDNEY DISEASE, UNSPECIFIED WHETHER STAGE 3A OR 3B CKD (HCC): ICD-10-CM

## 2024-09-16 DIAGNOSIS — N31.8 DETRUSOR DYSFUNCTION: ICD-10-CM

## 2024-09-16 DIAGNOSIS — G40.909 SEIZURE DISORDER (HCC): ICD-10-CM

## 2024-09-16 DIAGNOSIS — G20.B1 PARKINSON'S DISEASE WITH DYSKINESIA WITHOUT FLUCTUATING MANIFESTATIONS (HCC): ICD-10-CM

## 2024-09-16 DIAGNOSIS — F02.80 DEMENTIA OF THE ALZHEIMER'S TYPE WITH LATE ONSET WITHOUT BEHAVIORAL DISTURBANCE (HCC): ICD-10-CM

## 2024-09-16 DIAGNOSIS — G30.1 DEMENTIA OF THE ALZHEIMER'S TYPE WITH LATE ONSET WITHOUT BEHAVIORAL DISTURBANCE (HCC): ICD-10-CM

## 2024-09-16 DIAGNOSIS — C61 PROSTATE CANCER (HCC): ICD-10-CM

## 2024-09-16 DIAGNOSIS — I67.9 CEREBROVASCULAR DISEASE: ICD-10-CM

## 2024-09-16 DIAGNOSIS — Z72.0 TOBACCO ABUSE: ICD-10-CM

## 2024-09-16 PROCEDURE — 1123F ACP DISCUSS/DSCN MKR DOCD: CPT | Performed by: INTERNAL MEDICINE

## 2024-09-16 PROCEDURE — 99213 OFFICE O/P EST LOW 20 MIN: CPT | Performed by: INTERNAL MEDICINE

## 2024-10-10 ENCOUNTER — OFFICE VISIT (OUTPATIENT)
Facility: CLINIC | Age: 89
End: 2024-10-10
Payer: MEDICARE

## 2024-10-10 VITALS
WEIGHT: 129.4 LBS | SYSTOLIC BLOOD PRESSURE: 140 MMHG | BODY MASS INDEX: 19.16 KG/M2 | TEMPERATURE: 97.8 F | HEIGHT: 69 IN | DIASTOLIC BLOOD PRESSURE: 70 MMHG

## 2024-10-10 DIAGNOSIS — R04.0 EPISTAXIS: Primary | ICD-10-CM

## 2024-10-10 PROCEDURE — 1123F ACP DISCUSS/DSCN MKR DOCD: CPT | Performed by: INTERNAL MEDICINE

## 2024-10-10 PROCEDURE — 99213 OFFICE O/P EST LOW 20 MIN: CPT | Performed by: INTERNAL MEDICINE

## 2024-10-10 NOTE — PROGRESS NOTES
Dustin Alvarezaquiles Freeman is a 89 y.o. male     Chief Complaint   Patient presents with    Epistaxis     Nose bleed on Moises 10/6 & Wednesday 10/9       BP (!) 140/70 (Site: Left Upper Arm, Position: Sitting, Cuff Size: Large Adult)   Temp 97.8 °F (36.6 °C) (Oral)   Ht 1.753 m (5' 9\")   Wt 58.7 kg (129 lb 6.4 oz)   BMI 19.11 kg/m²     Health Maintenance Due   Topic Date Due    DTaP/Tdap/Td vaccine (1 - Tdap) Never done    Shingles vaccine (1 of 2) Never done    Respiratory Syncytial Virus (RSV) Pregnant or age 60 yrs+ (1 - 1-dose 60+ series) Never done    Flu vaccine (1) 08/01/2024    COVID-19 Vaccine (6 - 2023-24 season) 09/01/2024         \"Have you been to the ER, urgent care clinic since your last visit?  Hospitalized since your last visit?\"    NO    “Have you seen or consulted any other health care providers outside of [facility] since your last visit?”    NO                    
Brother     Osteoarthritis Brother     Heart Disease Brother     COPD Brother     Dementia Mother     Hypertension Sister     Heart Disease Sister     Stroke Father     Hypertension Child        Review of Systems  Constitutional:  negative for fevers, chills, anorexia and weight loss  Eyes:    negative for visual disturbance and irritation  ENT:    negative for tinnitus,sore throat,nasal congestion,ear pains.hoarseness  Respiratory:     negative for cough, hemoptysis, dyspnea,wheezing  CV:    negative for chest pain, palpitations, lower extremity edema  GI:    negative for nausea, vomiting, diarrhea, abdominal pain,melena  Endo:               negative for polyuria,polydipsia,polyphagia,heat intolerance  Genitourinary : negative for frequency, dysuria and hematuria  Integumentary: negative for rash and pruritus  Hematologic:   negative for easy bruising and gum/nose bleeding  Musculoskel:  negative for myalgias, arthralgias, back pain, muscle weakness  Neurological:   negative for headaches, dizziness, vertigo, memory problems and gait   Behavl/Psych:  negative for feelings of anxiety, depression, mood changes  ROS otherwise negative      Objective:  BP (!) 140/70 (Site: Left Upper Arm, Position: Sitting, Cuff Size: Large Adult)   Temp 97.8 °F (36.6 °C) (Oral)   Ht 1.753 m (5' 9\")   Wt 58.7 kg (129 lb 6.4 oz)   BMI 19.11 kg/m²   Physical Exam:   General appearance - alert, well appearing, and in no distress  Mental status - alert, oriented to person, place, and time  EYE-REID, EOMI, fundi normal, corneas normal, no foreign bodies  ENT-ENT exam normal, no neck nodes or sinus tenderness  Nose - normal and patent, no erythema, discharge or polyps  Mouth - mucous membranes moist, pharynx normal without lesions  Neck - supple, no significant adenopathy   Chest - clear to auscultation, no wheezes, rales or rhonchi, symmetric air entry   Heart - normal rate, regular rhythm, normal S1, S2, no murmurs, rubs, clicks or

## 2024-11-29 DIAGNOSIS — G30.1 ALZHEIMER'S DISEASE WITH LATE ONSET (CODE) (HCC): ICD-10-CM

## 2024-11-29 DIAGNOSIS — G20.A1 PARKINSON'S DISEASE (HCC): ICD-10-CM

## 2024-11-29 RX ORDER — RASAGILINE 1 MG/1
TABLET ORAL
Qty: 90 TABLET | Refills: 5 | OUTPATIENT
Start: 2024-11-29

## 2024-11-29 RX ORDER — ZONISAMIDE 100 MG/1
CAPSULE ORAL
Qty: 90 CAPSULE | Refills: 3 | OUTPATIENT
Start: 2024-11-29

## 2024-11-29 NOTE — TELEPHONE ENCOUNTER
Patient has not been seen since 2018. Patient will need to get refills from the Primary Care doctor until patient is seen in clinic.

## 2024-11-29 NOTE — TELEPHONE ENCOUNTER
Patients daughter is calling and states that patient needs these medications refilled. She states that patient is out of his medication.  Per daughter Dr. Purcell told them that he could refill these meds because she is having a hard time getting him an appointment with them, she called them and made the next available for May 2025.

## 2024-11-29 NOTE — TELEPHONE ENCOUNTER
Patients daughter stated that these refills were to go to the PCP. Stated she understands that we will not be able to refill medication until e is seen in the clinic.   Advised they call PCP office directly for refills to last until he is seen in the clinic.

## 2024-11-29 NOTE — TELEPHONE ENCOUNTER
Patients daughter is calling and states that patient needs these medications refilled. She states that patient is out of his medication.  Per daughter Dr. Purcell told them that he could refill these meds because she is having a hard time getting him an appointment with them, she called them and made the next available for May 2025.    PCP: BRENDA Purcell MD    Last appt: 10/10/2024  Future Appointments   Date Time Provider Department Center   1/31/2025  9:00 AM BRENDA Purcell MD [facility]   5/1/2025  9:00 AM Dao Dempsey MD [facility]       Requested Prescriptions     Pending Prescriptions Disp Refills    rasagiline mesylate 1 MG TABS 90 tablet 5     Sig: Take 1 tablet by mouth daily    zonisamide (ZONEGRAN) 100 MG capsule 90 capsule 0     Sig: Take 1 capsule by mouth daily       Prior labs and Blood pressures:  BP Readings from Last 3 Encounters:   10/10/24 (!) 140/70   09/16/24 128/78   07/30/24 134/74     Lab Results   Component Value Date/Time     07/30/2024 09:22 AM    K 4.6 07/30/2024 09:22 AM     07/30/2024 09:22 AM    CO2 27 07/30/2024 09:22 AM    BUN 26 07/30/2024 09:22 AM    GFRAA 55 07/14/2022 12:36 PM     No results found for: \"HBA1C\", \"PAS2WFAZ\"  Lab Results   Component Value Date/Time    CHOL 140 07/30/2024 09:22 AM    HDL 60 07/30/2024 09:22 AM    LDL 66.4 07/30/2024 09:22 AM    LDL 78.4 01/29/2024 09:30 AM    VLDL 13.6 07/30/2024 09:22 AM    VLDL 11 06/24/2020 04:46 PM     No results found for: \"VITD3\"        No results found for: \"TSH\", \"TSH2\", \"TSH3\"

## 2024-12-02 RX ORDER — RASAGILINE 1 MG/1
1 TABLET ORAL DAILY
Qty: 90 TABLET | Refills: 0 | Status: SHIPPED | OUTPATIENT
Start: 2024-12-02

## 2024-12-02 RX ORDER — ZONISAMIDE 100 MG/1
100 CAPSULE ORAL DAILY
Qty: 90 CAPSULE | Refills: 0 | Status: SHIPPED | OUTPATIENT
Start: 2024-12-02

## 2025-01-08 NOTE — TELEPHONE ENCOUNTER
Requested Prescriptions     Pending Prescriptions Disp Refills    mirabegron ER (MYRBETRIQ) 25 mg ER tablet 90 Tab 0     Sig: Take 1 Tab by mouth daily.         Last Refill: 12/11/17  Next Appointment:03/09/18 Male

## 2025-01-31 ENCOUNTER — OFFICE VISIT (OUTPATIENT)
Facility: CLINIC | Age: 89
End: 2025-01-31

## 2025-01-31 VITALS
HEART RATE: 39 BPM | OXYGEN SATURATION: 96 % | TEMPERATURE: 97.6 F | WEIGHT: 135 LBS | HEIGHT: 69 IN | RESPIRATION RATE: 18 BRPM | SYSTOLIC BLOOD PRESSURE: 132 MMHG | DIASTOLIC BLOOD PRESSURE: 60 MMHG | BODY MASS INDEX: 19.99 KG/M2

## 2025-01-31 DIAGNOSIS — E78.00 ELEVATED CHOLESTEROL: ICD-10-CM

## 2025-01-31 DIAGNOSIS — C61 PROSTATE CANCER (HCC): ICD-10-CM

## 2025-01-31 DIAGNOSIS — I67.9 CEREBROVASCULAR DISEASE: ICD-10-CM

## 2025-01-31 DIAGNOSIS — I10 ESSENTIAL HYPERTENSION: Primary | ICD-10-CM

## 2025-01-31 DIAGNOSIS — G20.B1 PARKINSON'S DISEASE WITH DYSKINESIA WITHOUT FLUCTUATING MANIFESTATIONS (HCC): ICD-10-CM

## 2025-01-31 DIAGNOSIS — D64.9 CHRONIC ANEMIA: ICD-10-CM

## 2025-01-31 DIAGNOSIS — Z79.899 ON STATIN THERAPY: ICD-10-CM

## 2025-01-31 DIAGNOSIS — Z51.81 ENCOUNTER FOR THERAPEUTIC DRUG LEVEL MONITORING: ICD-10-CM

## 2025-01-31 DIAGNOSIS — G30.1 DEMENTIA OF THE ALZHEIMER'S TYPE WITH LATE ONSET WITHOUT BEHAVIORAL DISTURBANCE (HCC): ICD-10-CM

## 2025-01-31 DIAGNOSIS — Z00.00 MEDICARE ANNUAL WELLNESS VISIT, SUBSEQUENT: ICD-10-CM

## 2025-01-31 DIAGNOSIS — G40.909 SEIZURE DISORDER (HCC): ICD-10-CM

## 2025-01-31 DIAGNOSIS — N18.30 STAGE 3 CHRONIC KIDNEY DISEASE, UNSPECIFIED WHETHER STAGE 3A OR 3B CKD (HCC): ICD-10-CM

## 2025-01-31 DIAGNOSIS — N31.8 DETRUSOR DYSFUNCTION: ICD-10-CM

## 2025-01-31 DIAGNOSIS — F02.80 DEMENTIA OF THE ALZHEIMER'S TYPE WITH LATE ONSET WITHOUT BEHAVIORAL DISTURBANCE (HCC): ICD-10-CM

## 2025-01-31 LAB
ALBUMIN SERPL-MCNC: 3.1 G/DL (ref 3.5–5)
ALBUMIN/GLOB SERPL: 0.8 (ref 1.1–2.2)
ALP SERPL-CCNC: 96 U/L (ref 45–117)
ALT SERPL-CCNC: 18 U/L (ref 12–78)
ANION GAP SERPL CALC-SCNC: 7 MMOL/L (ref 2–12)
AST SERPL-CCNC: 28 U/L (ref 15–37)
BASOPHILS # BLD: 0.02 K/UL (ref 0–0.1)
BASOPHILS NFR BLD: 0.2 % (ref 0–1)
BILIRUB SERPL-MCNC: 0.3 MG/DL (ref 0.2–1)
BUN SERPL-MCNC: 23 MG/DL (ref 6–20)
BUN/CREAT SERPL: 17 (ref 12–20)
CALCIUM SERPL-MCNC: 9.1 MG/DL (ref 8.5–10.1)
CHLORIDE SERPL-SCNC: 110 MMOL/L (ref 97–108)
CHOLEST SERPL-MCNC: 145 MG/DL
CK SERPL-CCNC: 98 U/L (ref 39–308)
CO2 SERPL-SCNC: 26 MMOL/L (ref 21–32)
CREAT SERPL-MCNC: 1.36 MG/DL (ref 0.7–1.3)
DIFFERENTIAL METHOD BLD: ABNORMAL
EOSINOPHIL # BLD: 0.06 K/UL (ref 0–0.4)
EOSINOPHIL NFR BLD: 0.5 % (ref 0–7)
ERYTHROCYTE [DISTWIDTH] IN BLOOD BY AUTOMATED COUNT: 16.9 % (ref 11.5–14.5)
GLOBULIN SER CALC-MCNC: 4.1 G/DL (ref 2–4)
GLUCOSE SERPL-MCNC: 78 MG/DL (ref 65–100)
HCT VFR BLD AUTO: 27.6 % (ref 36.6–50.3)
HDLC SERPL-MCNC: 79 MG/DL
HDLC SERPL: 1.8 (ref 0–5)
HGB BLD-MCNC: 8.1 G/DL (ref 12.1–17)
IMM GRANULOCYTES # BLD AUTO: 0.06 K/UL (ref 0–0.04)
IMM GRANULOCYTES NFR BLD AUTO: 0.5 % (ref 0–0.5)
LDLC SERPL CALC-MCNC: 56.8 MG/DL (ref 0–100)
LYMPHOCYTES # BLD: 0.79 K/UL (ref 0.8–3.5)
LYMPHOCYTES NFR BLD: 7.1 % (ref 12–49)
MCH RBC QN AUTO: 27.2 PG (ref 26–34)
MCHC RBC AUTO-ENTMCNC: 29.3 G/DL (ref 30–36.5)
MCV RBC AUTO: 92.6 FL (ref 80–99)
MONOCYTES # BLD: 0.51 K/UL (ref 0–1)
MONOCYTES NFR BLD: 4.6 % (ref 5–13)
NEUTS SEG # BLD: 9.67 K/UL (ref 1.8–8)
NEUTS SEG NFR BLD: 87.1 % (ref 32–75)
NRBC # BLD: 0 K/UL (ref 0–0.01)
NRBC BLD-RTO: 0 PER 100 WBC
PLATELET # BLD AUTO: 220 K/UL (ref 150–400)
POTASSIUM SERPL-SCNC: 4.8 MMOL/L (ref 3.5–5.1)
PROT SERPL-MCNC: 7.2 G/DL (ref 6.4–8.2)
RBC # BLD AUTO: 2.98 M/UL (ref 4.1–5.7)
RBC MORPH BLD: ABNORMAL
SODIUM SERPL-SCNC: 143 MMOL/L (ref 136–145)
TRIGL SERPL-MCNC: 46 MG/DL
VLDLC SERPL CALC-MCNC: 9.2 MG/DL
WBC # BLD AUTO: 11.1 K/UL (ref 4.1–11.1)

## 2025-01-31 SDOH — ECONOMIC STABILITY: FOOD INSECURITY: WITHIN THE PAST 12 MONTHS, THE FOOD YOU BOUGHT JUST DIDN'T LAST AND YOU DIDN'T HAVE MONEY TO GET MORE.: NEVER TRUE

## 2025-01-31 SDOH — ECONOMIC STABILITY: FOOD INSECURITY: WITHIN THE PAST 12 MONTHS, YOU WORRIED THAT YOUR FOOD WOULD RUN OUT BEFORE YOU GOT MONEY TO BUY MORE.: NEVER TRUE

## 2025-01-31 ASSESSMENT — PATIENT HEALTH QUESTIONNAIRE - PHQ9
SUM OF ALL RESPONSES TO PHQ QUESTIONS 1-9: 0
SUM OF ALL RESPONSES TO PHQ QUESTIONS 1-9: 0
SUM OF ALL RESPONSES TO PHQ9 QUESTIONS 1 & 2: 0
2. FEELING DOWN, DEPRESSED OR HOPELESS: NOT AT ALL
SUM OF ALL RESPONSES TO PHQ QUESTIONS 1-9: 0
SUM OF ALL RESPONSES TO PHQ QUESTIONS 1-9: 0
1. LITTLE INTEREST OR PLEASURE IN DOING THINGS: NOT AT ALL

## 2025-01-31 ASSESSMENT — LIFESTYLE VARIABLES
HOW MANY STANDARD DRINKS CONTAINING ALCOHOL DO YOU HAVE ON A TYPICAL DAY: PATIENT DOES NOT DRINK
HOW OFTEN DO YOU HAVE A DRINK CONTAINING ALCOHOL: NEVER

## 2025-01-31 NOTE — PROGRESS NOTES
Medicare Annual Wellness Visit    Dustin Haines Sr. 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 (HCC)  Seizure disorder (HCC)  -     Levetiracetam Level; Future  Prostate cancer (HCC)  Detrusor dysfunction  Stage 3 chronic kidney disease, unspecified whether stage 3a or 3b CKD (HCC)  -     Comprehensive Metabolic Panel; Future  On statin therapy  -     Comprehensive Metabolic Panel; Future  -     CK; Future  -     Lipid Panel; Future  Elevated cholesterol  -     Comprehensive Metabolic Panel; Future  -     CK; Future  -     Lipid Panel; Future  Encounter for therapeutic drug level monitoring  -     Levetiracetam Level; Future  Chronic anemia  -     CBC with Auto Differential; Future  Medicare annual wellness visit, subsequent       Return in 6 months (on 7/31/2025).     Subjective       Patient's complete Health Risk Assessment and screening values have been reviewed and are found in Flowsheets. The following problems were reviewed today and where indicated follow up appointments were made and/or referrals ordered.    Positive Risk Factor Screenings with Interventions:    Fall Risk:  Do you feel unsteady or are you worried about falling? : no  2 or more falls in past year?: no  Fall with injury in past year?: (!) yes     Interventions:    Reviewed medications, home hazards, visual acuity, and co-morbidities that can increase risk for falls  See AVS for additional education material             Inactivity:  On average, how many days per week do you engage in moderate to strenuous exercise (like a brisk walk)?: 0 days (!) Abnormal  On average, how many minutes do you engage in exercise at this level?: 0

## 2025-01-31 NOTE — PROGRESS NOTES
Dustin FE Haines Sr. is a 89 y.o. male     Chief Complaint   Patient presents with    Medicare AWV    6 Month Follow-Up       /60 (Site: Left Upper Arm, Position: Sitting, Cuff Size: Medium Adult)   Pulse (!) 39   Temp 97.6 °F (36.4 °C) (Temporal)   Resp 18   Ht 1.753 m (5' 9\")   Wt 61.2 kg (135 lb)   SpO2 96%   BMI 19.94 kg/m²     Health Maintenance Due   Topic Date Due    DTaP/Tdap/Td vaccine (1 - Tdap) Never done    Shingles vaccine (1 of 2) Never done    Respiratory Syncytial Virus (RSV) Pregnant or age 60 yrs+ (1 - 1-dose 75+ series) Never done    Annual Wellness Visit (Medicare Advantage)  01/01/2025    Depression Screen  01/29/2025         \"Have you been to the ER, urgent care clinic since your last visit?  Hospitalized since your last visit?\"    NO    “Have you seen or consulted any other health care providers outside of [facility] since your last visit?”    NO

## 2025-02-03 LAB — LEVETIRACETAM SERPL-MCNC: 37 UG/ML (ref 10–40)

## 2025-02-24 RX ORDER — ASPIRIN AND DIPYRIDAMOLE 25; 200 MG/1; MG/1
1 CAPSULE, EXTENDED RELEASE ORAL 2 TIMES DAILY
Qty: 180 CAPSULE | Refills: 1 | Status: SHIPPED | OUTPATIENT
Start: 2025-02-24

## 2025-02-24 NOTE — TELEPHONE ENCOUNTER
PCP: BRENDA Purcell MD    Last appt: 1/31/2025    Future Appointments   Date Time Provider Department Center   5/1/2025  9:00 AM Dao Dempsey MD [facility]   7/31/2025  8:30 AM BRENDA Purcell MD [facility]       Requested Prescriptions     Pending Prescriptions Disp Refills    aspirin-dipyridamole (AGGRENOX)  MG per extended release capsule [Pharmacy Med Name: ASPIRIN-DIPYRIDAM ER  MG] 180 capsule 1     Sig: TAKE 1 CAPSULE BY MOUTH TWICE A DAY

## 2025-03-07 DIAGNOSIS — G30.1 ALZHEIMER'S DISEASE WITH LATE ONSET (CODE) (HCC): ICD-10-CM

## 2025-03-07 DIAGNOSIS — G20.A1 PARKINSON'S DISEASE (HCC): ICD-10-CM

## 2025-03-07 RX ORDER — ZONISAMIDE 100 MG/1
100 CAPSULE ORAL DAILY
Qty: 30 CAPSULE | Refills: 3 | Status: SHIPPED | OUTPATIENT
Start: 2025-03-07

## 2025-03-07 NOTE — TELEPHONE ENCOUNTER
Disp Refills Start End    zonisamide (ZONEGRAN) 100 MG capsule  30 capsule 0 7/10/2023 11/29/2024    Sig: TAKE 1 CAPSULE BY MOUTH EVERY DAY      Patient is out of this medication.  Patients daughter called to see if Dr. Purcell would fill this medication due to patient is unable to get this medication through his neurologist until he is seen.  Daughter states he is scheduled to see Dr. Dempsey in April.     Last visit 1/31/25  Future appt 7/31/25    Pharmacy [facility] [address]

## 2025-03-07 NOTE — TELEPHONE ENCOUNTER
PCP: BRENDA Purcell MD    Last appt: 1/31/2025    Future Appointments   Date Time Provider Department Center   5/1/2025  9:00 AM Dao Dempsey MD [facility]   7/31/2025  8:30 AM BRENDA Purcell MD [facility] DEP       Requested Prescriptions     Pending Prescriptions Disp Refills    zonisamide (ZONEGRAN) 100 MG capsule 30 capsule 0     Sig: Take 1 capsule by mouth daily

## 2025-03-15 DIAGNOSIS — G30.1 ALZHEIMER'S DISEASE WITH LATE ONSET (CODE) (HCC): ICD-10-CM

## 2025-03-15 DIAGNOSIS — G20.A1 PARKINSON'S DISEASE (HCC): ICD-10-CM

## 2025-03-17 RX ORDER — ZONISAMIDE 100 MG/1
CAPSULE ORAL DAILY
Qty: 90 CAPSULE | Refills: 3 | OUTPATIENT
Start: 2025-03-17

## 2025-04-02 DIAGNOSIS — G30.1 ALZHEIMER'S DISEASE WITH LATE ONSET (CODE): ICD-10-CM

## 2025-04-02 DIAGNOSIS — G20.A1 PARKINSON'S DISEASE: ICD-10-CM

## 2025-04-02 RX ORDER — RASAGILINE 1 MG/1
1 TABLET ORAL DAILY
Qty: 90 TABLET | Refills: 0 | Status: SHIPPED | OUTPATIENT
Start: 2025-04-02

## 2025-04-02 NOTE — TELEPHONE ENCOUNTER
PCP: BRENDA Purcell MD    Last appt: 1/31/2025    Future Appointments   Date Time Provider Department Center   5/1/2025  9:00 AM Dao Dempsey MD [facility]   7/31/2025  8:30 AM BRENDA Purcell MD [facility] DEP       Requested Prescriptions     Pending Prescriptions Disp Refills    rasagiline mesylate 1 MG TABS 90 tablet 0     Sig: Take 1 tablet by mouth daily

## 2025-06-02 RX ORDER — FUROSEMIDE 20 MG/1
20 TABLET ORAL DAILY
Qty: 90 TABLET | Refills: 3 | Status: SHIPPED | OUTPATIENT
Start: 2025-06-02

## 2025-06-02 RX ORDER — TAMSULOSIN HYDROCHLORIDE 0.4 MG/1
CAPSULE ORAL DAILY
Qty: 90 CAPSULE | Refills: 3 | Status: SHIPPED | OUTPATIENT
Start: 2025-06-02

## 2025-06-02 RX ORDER — PRAVASTATIN SODIUM 40 MG
40 TABLET ORAL DAILY
Qty: 90 TABLET | Refills: 3 | Status: SHIPPED | OUTPATIENT
Start: 2025-06-02

## 2025-06-05 DIAGNOSIS — G20.A1 PARKINSON'S DISEASE (HCC): ICD-10-CM

## 2025-06-05 DIAGNOSIS — G30.1 ALZHEIMER'S DISEASE WITH LATE ONSET (CODE) (HCC): ICD-10-CM

## 2025-06-05 DIAGNOSIS — G40.909 SEIZURE DISORDER (HCC): Primary | ICD-10-CM

## 2025-06-06 RX ORDER — ZONISAMIDE 100 MG/1
CAPSULE ORAL DAILY
Qty: 90 CAPSULE | Refills: 3 | Status: SHIPPED | OUTPATIENT
Start: 2025-06-06

## 2025-06-12 DIAGNOSIS — G20.A1 PARKINSON'S DISEASE (HCC): ICD-10-CM

## 2025-06-12 DIAGNOSIS — G30.1 ALZHEIMER'S DISEASE WITH LATE ONSET (CODE) (HCC): ICD-10-CM

## 2025-06-12 RX ORDER — LEVETIRACETAM 500 MG/1
TABLET ORAL
Qty: 270 TABLET | Refills: 5 | OUTPATIENT
Start: 2025-06-12

## 2025-07-02 DIAGNOSIS — G30.1 ALZHEIMER'S DISEASE WITH LATE ONSET (CODE) (HCC): ICD-10-CM

## 2025-07-02 DIAGNOSIS — G20.A1 PARKINSON'S DISEASE (HCC): ICD-10-CM

## 2025-07-02 NOTE — TELEPHONE ENCOUNTER
PCP: BRENDA Purcell MD    Last appt: 1/31/2025    Future Appointments   Date Time Provider Department Center   7/31/2025  8:30 AM BRENDA Purcell MD [facility]       Requested Prescriptions     Pending Prescriptions Disp Refills    rasagiline mesylate 1 MG TABS [Pharmacy Med Name: RASAGILINE MESYLATE 1 MG TAB] 90 tablet 0     Sig: TAKE 1 TABLET BY MOUTH EVERY DAY

## 2025-07-03 RX ORDER — RASAGILINE 1 MG/1
1 TABLET ORAL DAILY
Qty: 90 TABLET | Refills: 1 | Status: SHIPPED | OUTPATIENT
Start: 2025-07-03

## 2025-07-31 ENCOUNTER — OFFICE VISIT (OUTPATIENT)
Facility: CLINIC | Age: 89
End: 2025-07-31
Payer: MEDICARE

## 2025-07-31 VITALS
TEMPERATURE: 97.5 F | WEIGHT: 121.8 LBS | SYSTOLIC BLOOD PRESSURE: 132 MMHG | BODY MASS INDEX: 18.04 KG/M2 | HEIGHT: 69 IN | RESPIRATION RATE: 18 BRPM | DIASTOLIC BLOOD PRESSURE: 58 MMHG

## 2025-07-31 DIAGNOSIS — Z79.899 ON STATIN THERAPY: ICD-10-CM

## 2025-07-31 DIAGNOSIS — G30.1 DEMENTIA OF THE ALZHEIMER'S TYPE WITH LATE ONSET WITHOUT BEHAVIORAL DISTURBANCE (HCC): ICD-10-CM

## 2025-07-31 DIAGNOSIS — D64.9 CHRONIC ANEMIA: ICD-10-CM

## 2025-07-31 DIAGNOSIS — I67.9 CEREBROVASCULAR DISEASE: ICD-10-CM

## 2025-07-31 DIAGNOSIS — I10 ESSENTIAL HYPERTENSION: Primary | ICD-10-CM

## 2025-07-31 DIAGNOSIS — G20.B1 PARKINSON'S DISEASE WITH DYSKINESIA WITHOUT FLUCTUATING MANIFESTATIONS (HCC): ICD-10-CM

## 2025-07-31 DIAGNOSIS — N31.8 DETRUSOR DYSFUNCTION: ICD-10-CM

## 2025-07-31 DIAGNOSIS — Z86.73 HISTORY OF CVA (CEREBROVASCULAR ACCIDENT): ICD-10-CM

## 2025-07-31 DIAGNOSIS — C61 PROSTATE CANCER (HCC): ICD-10-CM

## 2025-07-31 DIAGNOSIS — N18.30 STAGE 3 CHRONIC KIDNEY DISEASE, UNSPECIFIED WHETHER STAGE 3A OR 3B CKD (HCC): ICD-10-CM

## 2025-07-31 DIAGNOSIS — F02.80 DEMENTIA OF THE ALZHEIMER'S TYPE WITH LATE ONSET WITHOUT BEHAVIORAL DISTURBANCE (HCC): ICD-10-CM

## 2025-07-31 DIAGNOSIS — G40.209 COMPLEX PARTIAL SEIZURE EVOLVING TO GENERALIZED SEIZURE (HCC): ICD-10-CM

## 2025-07-31 LAB
ALBUMIN SERPL-MCNC: 3.6 G/DL (ref 3.5–5.2)
ALBUMIN/GLOB SERPL: 0.9 (ref 1.1–2.2)
ALP SERPL-CCNC: 91 U/L (ref 40–129)
ALT SERPL-CCNC: 32 U/L (ref 10–35)
ANION GAP SERPL CALC-SCNC: 14 MMOL/L (ref 2–14)
AST SERPL-CCNC: ABNORMAL U/L (ref 10–50)
BASOPHILS # BLD: 0.04 K/UL (ref 0–0.1)
BASOPHILS NFR BLD: 0.4 % (ref 0–1)
BILIRUB SERPL-MCNC: 0.2 MG/DL (ref 0–1.2)
BUN SERPL-MCNC: 33 MG/DL (ref 8–23)
CALCIUM SERPL-MCNC: 9.2 MG/DL (ref 8.2–9.6)
CHLORIDE SERPL-SCNC: 107 MMOL/L (ref 98–107)
CHOLEST SERPL-MCNC: 131 MG/DL (ref 0–200)
CK SERPL-CCNC: 57 U/L (ref 39–308)
CO2 SERPL-SCNC: 21 MMOL/L (ref 20–29)
CREAT SERPL-MCNC: 1.93 MG/DL (ref 0.7–1.2)
DIFFERENTIAL METHOD BLD: ABNORMAL
EOSINOPHIL # BLD: 0.08 K/UL (ref 0–0.4)
EOSINOPHIL NFR BLD: 0.8 % (ref 0–7)
ERYTHROCYTE [DISTWIDTH] IN BLOOD BY AUTOMATED COUNT: 17.8 % (ref 11.5–14.5)
GLOBULIN SER CALC-MCNC: 3.8 G/DL (ref 2–4)
GLUCOSE SERPL-MCNC: 122 MG/DL (ref 65–100)
HCT VFR BLD AUTO: 33.3 % (ref 36.6–50.3)
HDLC SERPL-MCNC: 58 MG/DL (ref 40–60)
HDLC SERPL: 2.3
HGB BLD-MCNC: 10 G/DL (ref 12.1–17)
IMM GRANULOCYTES # BLD AUTO: 0.04 K/UL (ref 0–0.04)
IMM GRANULOCYTES NFR BLD AUTO: 0.4 % (ref 0–0.5)
LDLC SERPL CALC-MCNC: 62 MG/DL
LYMPHOCYTES # BLD: 1.49 K/UL (ref 0.8–3.5)
LYMPHOCYTES NFR BLD: 15.1 % (ref 12–49)
MCH RBC QN AUTO: 28.2 PG (ref 26–34)
MCHC RBC AUTO-ENTMCNC: 30 G/DL (ref 30–36.5)
MCV RBC AUTO: 93.8 FL (ref 80–99)
MONOCYTES # BLD: 0.62 K/UL (ref 0–1)
MONOCYTES NFR BLD: 6.3 % (ref 5–13)
NEUTS SEG # BLD: 7.62 K/UL (ref 1.8–8)
NEUTS SEG NFR BLD: 77 % (ref 32–75)
NRBC # BLD: 0 K/UL (ref 0–0.01)
NRBC BLD-RTO: 0 PER 100 WBC
PLATELET # BLD AUTO: 198 K/UL (ref 150–400)
POTASSIUM SERPL-SCNC: ABNORMAL MMOL/L (ref 3.5–5.1)
PROT SERPL-MCNC: 7.5 G/DL (ref 6.4–8.3)
PSA SERPL-MCNC: <0.1 NG/ML (ref 0–4.4)
RBC # BLD AUTO: 3.55 M/UL (ref 4.1–5.7)
RBC MORPH BLD: ABNORMAL
RBC MORPH BLD: ABNORMAL
SODIUM SERPL-SCNC: 141 MMOL/L (ref 136–145)
TRIGL SERPL-MCNC: 53 MG/DL (ref 0–150)
VLDLC SERPL CALC-MCNC: 11 MG/DL
WBC # BLD AUTO: 9.9 K/UL (ref 4.1–11.1)

## 2025-07-31 PROCEDURE — G8419 CALC BMI OUT NRM PARAM NOF/U: HCPCS | Performed by: INTERNAL MEDICINE

## 2025-07-31 PROCEDURE — 1036F TOBACCO NON-USER: CPT | Performed by: INTERNAL MEDICINE

## 2025-07-31 PROCEDURE — 1123F ACP DISCUSS/DSCN MKR DOCD: CPT | Performed by: INTERNAL MEDICINE

## 2025-07-31 PROCEDURE — 99214 OFFICE O/P EST MOD 30 MIN: CPT | Performed by: INTERNAL MEDICINE

## 2025-07-31 PROCEDURE — G8427 DOCREV CUR MEDS BY ELIG CLIN: HCPCS | Performed by: INTERNAL MEDICINE

## 2025-07-31 PROCEDURE — 1126F AMNT PAIN NOTED NONE PRSNT: CPT | Performed by: INTERNAL MEDICINE

## 2025-07-31 PROCEDURE — 1159F MED LIST DOCD IN RCRD: CPT | Performed by: INTERNAL MEDICINE

## 2025-07-31 RX ORDER — LEVETIRACETAM 100 MG/ML
1500 SOLUTION ORAL DAILY
Qty: 4260 ML | Refills: 0 | Status: SHIPPED | OUTPATIENT
Start: 2025-07-31 | End: 2026-05-11

## 2025-07-31 NOTE — PROGRESS NOTES
Dustin Haines . is a 89 y.o. male and presents with Rtn in 6M  .    Subjective:  Mr. Haines presents today companied by his daughter for follow-up of several problems including hypertension, cerebrovascular disease status post previous CVA, Parkinson's disease, Alzheimer's dementia, history of prostate cancer, detrusor dysfunction, CKD 3B, seizure disorder, and monitoring statin therapy.  He presents with no specific complaint today.  His daughter notes that he will at times cough.  He has been evaluated previously and has some mild aspiration and has been recommended that he use thickener's with his liquids but he will not drink with thickener.  He is at risk for aspiration.  His daughter request that we change his Keppra to liquid form because she has trouble getting him to swallow this pill.  He is able to manage his other medications as prescribed.    Past Medical History:   Diagnosis Date    Arthritis of ankle 8/16/2017    Cancer (HCC)     Prostate.    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     PROSTATECTOMY       Allergies   Allergen Reactions    Penicillins Swelling     Current Outpatient Medications   Medication Sig Dispense Refill    levETIRAcetam 500 MG/5ML SOLN Take 15 mLs by mouth daily As follows, 5 mL p.o. every morning, 10 mL

## 2025-07-31 NOTE — PROGRESS NOTES
Dustin Haines . is a 89 y.o. male     Chief Complaint   Patient presents with    Rtn in 6M     Pts daughter is requesting for patient to have liquid form of his medications, if possible. Because he holds the pill in this mouth and it gets difficult for him to swallow.    BP (!) 132/58 (BP Site: Left Upper Arm, Patient Position: Sitting, BP Cuff Size: Medium Adult)   Temp 97.5 °F (36.4 °C) (Temporal)   Resp 18   Ht 1.753 m (5' 9\")   Wt 55.2 kg (121 lb 12.8 oz)   BMI 17.99 kg/m²     Health Maintenance Due   Topic Date Due    DTaP/Tdap/Td vaccine (1 - Tdap) Never done    Shingles vaccine (1 of 2) Never done    Respiratory Syncytial Virus (RSV) Pregnant or age 60 yrs+ (1 - 1-dose 75+ series) Never done    COVID-19 Vaccine (7 - 2024-25 season) 04/21/2025    Prostate Specific Antigen (PSA) Screening or Monitoring  07/30/2025         \"Have you been to the ER, urgent care clinic since your last visit?  Hospitalized since your last visit?\"    NO    “Have you seen or consulted any other health care providers outside of [facility] since your last visit?”    NO

## 2025-09-03 RX ORDER — ASPIRIN AND DIPYRIDAMOLE 25; 200 MG/1; MG/1
1 CAPSULE, EXTENDED RELEASE ORAL 2 TIMES DAILY
Qty: 180 CAPSULE | Refills: 1 | Status: SHIPPED | OUTPATIENT
Start: 2025-09-03

## 2025-09-03 RX ORDER — AMLODIPINE BESYLATE 5 MG/1
5 TABLET ORAL DAILY
Qty: 90 TABLET | Refills: 1 | Status: SHIPPED | OUTPATIENT
Start: 2025-09-03